# Patient Record
Sex: MALE | Race: WHITE | NOT HISPANIC OR LATINO | Employment: OTHER | URBAN - METROPOLITAN AREA
[De-identification: names, ages, dates, MRNs, and addresses within clinical notes are randomized per-mention and may not be internally consistent; named-entity substitution may affect disease eponyms.]

---

## 2017-04-07 DIAGNOSIS — I10 ESSENTIAL HYPERTENSION: Primary | ICD-10-CM

## 2017-04-16 ENCOUNTER — RESULTS ENCOUNTER (OUTPATIENT)
Dept: FAMILY MEDICINE CLINIC | Facility: CLINIC | Age: 62
End: 2017-04-16

## 2017-04-16 DIAGNOSIS — I10 ESSENTIAL HYPERTENSION: ICD-10-CM

## 2017-04-18 LAB
BUN SERPL-MCNC: 18 MG/DL (ref 6–24)
BUN/CREAT SERPL: 20 (ref 9–20)
CALCIUM SERPL-MCNC: 9.5 MG/DL (ref 8.7–10.2)
CHLORIDE SERPL-SCNC: 106 MMOL/L (ref 96–106)
CO2 SERPL-SCNC: 22 MMOL/L (ref 18–29)
CREAT SERPL-MCNC: 0.92 MG/DL (ref 0.76–1.27)
GLUCOSE SERPL-MCNC: 105 MG/DL (ref 65–99)
POTASSIUM SERPL-SCNC: 3.9 MMOL/L (ref 3.5–5.2)
SODIUM SERPL-SCNC: 144 MMOL/L (ref 134–144)

## 2017-04-18 RX ORDER — ATENOLOL 25 MG/1
TABLET ORAL
Qty: 90 TABLET | Refills: 2 | Status: SHIPPED | OUTPATIENT
Start: 2017-04-18 | End: 2017-06-26 | Stop reason: SDUPTHER

## 2017-04-18 RX ORDER — LISINOPRIL AND HYDROCHLOROTHIAZIDE 20; 12.5 MG/1; MG/1
TABLET ORAL
Qty: 90 TABLET | Refills: 2 | Status: SHIPPED | OUTPATIENT
Start: 2017-04-18 | End: 2018-01-12 | Stop reason: SDUPTHER

## 2017-04-24 ENCOUNTER — OFFICE VISIT (OUTPATIENT)
Dept: FAMILY MEDICINE CLINIC | Facility: CLINIC | Age: 62
End: 2017-04-24

## 2017-04-24 VITALS
OXYGEN SATURATION: 96 % | SYSTOLIC BLOOD PRESSURE: 116 MMHG | HEIGHT: 73 IN | DIASTOLIC BLOOD PRESSURE: 78 MMHG | BODY MASS INDEX: 31.68 KG/M2 | HEART RATE: 45 BPM | WEIGHT: 239 LBS

## 2017-04-24 DIAGNOSIS — I10 ESSENTIAL HYPERTENSION: Primary | ICD-10-CM

## 2017-04-24 DIAGNOSIS — R73.01 IFG (IMPAIRED FASTING GLUCOSE): ICD-10-CM

## 2017-04-24 PROCEDURE — 99212 OFFICE O/P EST SF 10 MIN: CPT | Performed by: INTERNAL MEDICINE

## 2017-04-24 NOTE — PROGRESS NOTES
"Chief Complaint   Patient presents with   • Hypertension     6 month f/u & review labs   • Edema     Blood pressures have been consistently elevated and dental visits around 2010 and 2011.  We then started him on medication, gradually added multiple meds, and finally got his blood pressure under control around 2012.  This was with Zestoretic, atenolol 25 mg, and amlodipine 10 mg.  He developed pedal edema on the amlodipine, but tolerated the other 2 well.    He has continued exercise regularly since being diagnosed with hypertension.  He does the gym, he rides a bike, and he plays hockey in a men's league during the hockey season, which just finished.  He feels no limitations.  He denies chest pain or shortness of breath while active.    In October, we stopped the amlodipine due to the lower extremity edema, and it has resolved fully.  He has monitored his blood pressure at home, and has seen readings consistently around the 110-120/70 range.        Current Outpatient Prescriptions:   •  atenolol (TENORMIN) 25 MG tablet, TAKE ONE TABLET BY MOUTH DAILY, Disp: 90 tablet, Rfl: 2  •  lisinopril-hydrochlorothiazide (PRINZIDE,ZESTORETIC) 20-12.5 MG per tablet, TAKE ONE TABLET BY MOUTH DAILY, Disp: 90 tablet, Rfl: 2  •  Multiple Vitamins-Minerals (MULTIVITAMIN ADULT PO), Take  by mouth., Disp: , Rfl:     No Known Allergies    ROS:  No cough.  No muscle cramps.  No syncope or orthostatic symptoms.  No angina.  No further lower extremity edema as noted above.    /78 (BP Location: Right arm, Patient Position: Sitting, Cuff Size: Large Adult)  Pulse (!) 45  Ht 73\" (185.4 cm)  Wt 239 lb (108 kg)  SpO2 96%  BMI 31.53 kg/m2    EXAM:  Well-developed, well-nourished, in no acute distress.  Sclerae are anicteric; no periorbital edema.  [place] no carotid bruit.  Cardiac with a regular rhythm, and no murmur.  No lower extremity edema.  Pedal pulses are full bilaterally.  Station, gait, and coordination are normal.    Marcell " was seen today for hypertension and edema.    Diagnoses and all orders for this visit:    Essential hypertension    IFG (impaired fasting glucose)    Blood pressure readings look great on the amlodipine.  Continue the Zestoretic and atenolol.  Although he has bradycardia, he is completely asymptomatic from it.  He will contact us if he develops orthostatic symptoms.    [facility] was reviewed with the patient today.  The only abnormal maladies the blood sugar of 105.  He should remain active and curtail the starches in his diet.  Follow-up in 6 months.

## 2017-06-22 ENCOUNTER — OFFICE VISIT (OUTPATIENT)
Dept: FAMILY MEDICINE CLINIC | Facility: CLINIC | Age: 62
End: 2017-06-22

## 2017-06-22 VITALS
BODY MASS INDEX: 32.13 KG/M2 | DIASTOLIC BLOOD PRESSURE: 86 MMHG | HEART RATE: 46 BPM | HEIGHT: 73 IN | SYSTOLIC BLOOD PRESSURE: 118 MMHG | WEIGHT: 242.4 LBS | OXYGEN SATURATION: 96 %

## 2017-06-22 DIAGNOSIS — L25.5 RHUS DERMATITIS: Primary | ICD-10-CM

## 2017-06-22 PROCEDURE — 99213 OFFICE O/P EST LOW 20 MIN: CPT | Performed by: INTERNAL MEDICINE

## 2017-06-22 RX ORDER — PREDNISONE 10 MG/1
TABLET ORAL
Qty: 30 TABLET | Refills: 0 | Status: SHIPPED | OUTPATIENT
Start: 2017-06-22 | End: 2017-12-05

## 2017-06-22 NOTE — PROGRESS NOTES
"Chief Complaint   Patient presents with   • Rash     on legs & arms, symptoms x 2 weeks     Acute onset of rash 5-6 days ago, after clearing brush and burning it.  Pruruitic; starting to spread proximally.    He reports that he has never smoked. He has never used smokeless tobacco. He reports that he drinks alcohol. He reports that he does not use illicit drugs.     ROS: No SOA or wheezing.     /86 (BP Location: Left arm, Patient Position: Sitting, Cuff Size: Large Adult)  Pulse (!) 46  Ht 73\" (185.4 cm)  Wt 242 lb 6.4 oz (110 kg)  SpO2 96%  BMI 31.98 kg/m2    EXAM    Coalescent pustular eruption on the lower legs, coalescing in the popliteal fossa on the right.  No evidence of secondary infection.    Marcell was seen today for rash.    Diagnoses and all orders for this visit:    Rhus dermatitis    -     predniSONE (DELTASONE) 10 MG tablet; 4 po daily for 3 days; 3 po daily for 3 days; 2 po daily for 3 days; 1 po daily for 3 days.  May take Zyrtec bid if needed for the itching.          "

## 2017-06-27 RX ORDER — ATENOLOL 25 MG/1
12.5 TABLET ORAL DAILY
Qty: 30 TABLET | Refills: 12 | Status: SHIPPED | OUTPATIENT
Start: 2017-06-27 | End: 2018-07-08 | Stop reason: SDUPTHER

## 2017-10-11 DIAGNOSIS — Z00.00 ROUTINE GENERAL MEDICAL EXAMINATION AT A HEALTH CARE FACILITY: Primary | ICD-10-CM

## 2017-10-15 ENCOUNTER — RESULTS ENCOUNTER (OUTPATIENT)
Dept: FAMILY MEDICINE CLINIC | Facility: CLINIC | Age: 62
End: 2017-10-15

## 2017-10-15 DIAGNOSIS — Z00.00 ROUTINE GENERAL MEDICAL EXAMINATION AT A HEALTH CARE FACILITY: ICD-10-CM

## 2017-10-17 LAB
ALBUMIN SERPL-MCNC: 4.3 G/DL (ref 3.5–5.2)
ALBUMIN/GLOB SERPL: 2 G/DL
ALP SERPL-CCNC: 68 U/L (ref 39–117)
ALT SERPL-CCNC: 35 U/L (ref 1–41)
APPEARANCE UR: CLEAR
AST SERPL-CCNC: 24 U/L (ref 1–40)
BILIRUB SERPL-MCNC: 1 MG/DL (ref 0.1–1.2)
BILIRUB UR QL STRIP: NEGATIVE
BUN SERPL-MCNC: 14 MG/DL (ref 6–20)
BUN/CREAT SERPL: 13.3 (ref 7–25)
CALCIUM SERPL-MCNC: 9.5 MG/DL (ref 8.6–10.5)
CHLORIDE SERPL-SCNC: 100 MMOL/L (ref 98–107)
CHOLEST SERPL-MCNC: 141 MG/DL (ref 0–200)
CO2 SERPL-SCNC: 27.6 MMOL/L (ref 22–29)
COLOR UR: YELLOW
CREAT SERPL-MCNC: 1.05 MG/DL (ref 0.76–1.27)
ERYTHROCYTE [DISTWIDTH] IN BLOOD BY AUTOMATED COUNT: 12.9 % (ref 11.5–14.5)
GLOBULIN SER CALC-MCNC: 2.2 GM/DL
GLUCOSE SERPL-MCNC: 100 MG/DL (ref 65–99)
GLUCOSE UR QL: NEGATIVE
HCT VFR BLD AUTO: 46.7 % (ref 40.4–52.2)
HDLC SERPL-MCNC: 30 MG/DL (ref 40–60)
HGB BLD-MCNC: 15.6 G/DL (ref 13.7–17.6)
HGB UR QL STRIP: NEGATIVE
KETONES UR QL STRIP: NEGATIVE
LDLC SERPL CALC-MCNC: 81 MG/DL (ref 0–100)
LEUKOCYTE ESTERASE UR QL STRIP: NEGATIVE
MCH RBC QN AUTO: 30.1 PG (ref 27–32.7)
MCHC RBC AUTO-ENTMCNC: 33.4 G/DL (ref 32.6–36.4)
MCV RBC AUTO: 90.2 FL (ref 79.8–96.2)
NITRITE UR QL STRIP: NEGATIVE
PH UR STRIP: 6 [PH] (ref 5–8)
PLATELET # BLD AUTO: 231 10*3/MM3 (ref 140–500)
POTASSIUM SERPL-SCNC: 4.5 MMOL/L (ref 3.5–5.2)
PROT SERPL-MCNC: 6.5 G/DL (ref 6–8.5)
PROT UR QL STRIP: NEGATIVE
RBC # BLD AUTO: 5.18 10*6/MM3 (ref 4.6–6)
SODIUM SERPL-SCNC: 142 MMOL/L (ref 136–145)
SP GR UR: 1.02 (ref 1–1.03)
TRIGL SERPL-MCNC: 152 MG/DL (ref 0–150)
UROBILINOGEN UR STRIP-MCNC: NORMAL MG/DL
VLDLC SERPL CALC-MCNC: 30.4 MG/DL (ref 5–40)
WBC # BLD AUTO: 6.26 10*3/MM3 (ref 4.5–10.7)

## 2017-12-05 ENCOUNTER — OFFICE VISIT (OUTPATIENT)
Dept: FAMILY MEDICINE CLINIC | Facility: CLINIC | Age: 62
End: 2017-12-05

## 2017-12-05 VITALS
DIASTOLIC BLOOD PRESSURE: 78 MMHG | SYSTOLIC BLOOD PRESSURE: 118 MMHG | BODY MASS INDEX: 32.6 KG/M2 | HEIGHT: 73 IN | OXYGEN SATURATION: 98 % | HEART RATE: 93 BPM | WEIGHT: 246 LBS

## 2017-12-05 DIAGNOSIS — R73.01 IFG (IMPAIRED FASTING GLUCOSE): ICD-10-CM

## 2017-12-05 DIAGNOSIS — I10 ESSENTIAL HYPERTENSION: Primary | ICD-10-CM

## 2017-12-05 PROCEDURE — 99212 OFFICE O/P EST SF 10 MIN: CPT | Performed by: INTERNAL MEDICINE

## 2017-12-05 NOTE — PROGRESS NOTES
"Chief Complaint   Patient presents with   • Hypertension     Review labs     Blood pressures have been consistently elevated and dental visits around 2010 and 2011.  We then started him on medication, gradually added multiple meds, and finally got his blood pressure under control around 2012. This was with Zestoretic, atenolol 25 mg, and amlodipine 10 mg.  He developed pedal edema on the amlodipine, but tolerated the other 2 well.     He has continued exercise regularly since being diagnosed with hypertension.  He does the gym, he rides a bike, and he plays hockey in a men's league.  He feels no limitations.  He denies chest pain and shortness of breath while active playing and hiking.     In October, 2016, we stopped the amlodipine due to the lower extremity edema, and it has resolved fully.  His BP has been controlled since then.    No Known Allergies      Current Outpatient Prescriptions:   •  atenolol (TENORMIN) 25 MG tablet, Take 0.5 tablets by mouth Daily., Disp: 30 tablet, Rfl: 12  •  lisinopril-hydrochlorothiazide (PRINZIDE,ZESTORETIC) 20-12.5 MG per tablet, TAKE ONE TABLET BY MOUTH DAILY, Disp: 90 tablet, Rfl: 2  •  Multiple Vitamins-Minerals (MULTIVITAMIN ADULT PO), Take  by mouth., Disp: , Rfl:     ROS:   No cough.  No muscle cramps.  No syncope or orthostatic symptoms.  No angina.  No further lower extremity edema as noted above.      Vitals:    12/05/17 1322   BP: 118/78   BP Location: Left arm   Patient Position: Sitting   Cuff Size: Large Adult   Pulse: 93   SpO2: 98%   Weight: 112 kg (246 lb)   Height: 185.4 cm (73\")     EXAM:  Well-developed, well-nourished, in no acute distress.  Sclerae are anicteric; no periorbital edema.  [place] no carotid bruit.  Cardiac with a regular rhythm, and no murmur.  No lower extremity edema.  Pedal pulses are full bilaterally.    Marcell was seen today for hypertension.    Diagnoses and all orders for this visit:    Essential hypertension    IFG (impaired fasting " glucose)    Labs reviewed.  He will work on the exercise to raise his HDL and lower the glucose.  Continue the meds unchanged.      CPE in 6 months.

## 2018-01-15 RX ORDER — LISINOPRIL AND HYDROCHLOROTHIAZIDE 20; 12.5 MG/1; MG/1
TABLET ORAL
Qty: 30 TABLET | Refills: 5 | Status: SHIPPED | OUTPATIENT
Start: 2018-01-15 | End: 2018-12-05 | Stop reason: SDUPTHER

## 2018-05-22 DIAGNOSIS — Z12.5 SCREENING PSA (PROSTATE SPECIFIC ANTIGEN): ICD-10-CM

## 2018-05-22 DIAGNOSIS — Z00.00 ROUTINE GENERAL MEDICAL EXAMINATION AT HEALTH CARE FACILITY: Primary | ICD-10-CM

## 2018-05-29 LAB
ALBUMIN SERPL-MCNC: 4.4 G/DL (ref 3.5–5.2)
ALBUMIN/GLOB SERPL: 2.4 G/DL
ALP SERPL-CCNC: 60 U/L (ref 39–117)
ALT SERPL-CCNC: 35 U/L (ref 1–41)
APPEARANCE UR: CLEAR
AST SERPL-CCNC: 25 U/L (ref 1–40)
BILIRUB SERPL-MCNC: 0.8 MG/DL (ref 0.1–1.2)
BILIRUB UR QL STRIP: NEGATIVE
BUN SERPL-MCNC: 21 MG/DL (ref 8–23)
BUN/CREAT SERPL: 23.1 (ref 7–25)
CALCIUM SERPL-MCNC: 9 MG/DL (ref 8.6–10.5)
CHLORIDE SERPL-SCNC: 104 MMOL/L (ref 98–107)
CHOLEST SERPL-MCNC: 133 MG/DL (ref 0–200)
CO2 SERPL-SCNC: 25 MMOL/L (ref 22–29)
COLOR UR: ABNORMAL
CREAT SERPL-MCNC: 0.91 MG/DL (ref 0.76–1.27)
ERYTHROCYTE [DISTWIDTH] IN BLOOD BY AUTOMATED COUNT: 13 % (ref 11.5–14.5)
GFR SERPLBLD CREATININE-BSD FMLA CKD-EPI: 103 ML/MIN/1.73
GFR SERPLBLD CREATININE-BSD FMLA CKD-EPI: 85 ML/MIN/1.73
GLOBULIN SER CALC-MCNC: 1.8 GM/DL
GLUCOSE SERPL-MCNC: 108 MG/DL (ref 65–99)
GLUCOSE UR QL: NEGATIVE
HCT VFR BLD AUTO: 46.7 % (ref 40.4–52.2)
HDLC SERPL-MCNC: 31 MG/DL (ref 40–60)
HGB BLD-MCNC: 15.8 G/DL (ref 13.7–17.6)
HGB UR QL STRIP: NEGATIVE
KETONES UR QL STRIP: NEGATIVE
LDLC SERPL CALC-MCNC: 79 MG/DL (ref 0–100)
LEUKOCYTE ESTERASE UR QL STRIP: NEGATIVE
MCH RBC QN AUTO: 29.9 PG (ref 27–32.7)
MCHC RBC AUTO-ENTMCNC: 33.8 G/DL (ref 32.6–36.4)
MCV RBC AUTO: 88.3 FL (ref 79.8–96.2)
NITRITE UR QL STRIP: NEGATIVE
PH UR STRIP: 5.5 [PH] (ref 5–8)
PLATELET # BLD AUTO: 223 10*3/MM3 (ref 140–500)
POTASSIUM SERPL-SCNC: 4.3 MMOL/L (ref 3.5–5.2)
PROT SERPL-MCNC: 6.2 G/DL (ref 6–8.5)
PROT UR QL STRIP: NEGATIVE
PSA SERPL-MCNC: 1.21 NG/ML (ref 0–4)
RBC # BLD AUTO: 5.29 10*6/MM3 (ref 4.6–6)
SODIUM SERPL-SCNC: 143 MMOL/L (ref 136–145)
SP GR UR: ABNORMAL (ref 1–1.03)
TRIGL SERPL-MCNC: 117 MG/DL (ref 0–150)
UROBILINOGEN UR STRIP-MCNC: ABNORMAL MG/DL
VLDLC SERPL CALC-MCNC: 23.4 MG/DL (ref 5–40)
WBC # BLD AUTO: 5.42 10*3/MM3 (ref 4.5–10.7)

## 2018-06-05 ENCOUNTER — OFFICE VISIT (OUTPATIENT)
Dept: FAMILY MEDICINE CLINIC | Facility: CLINIC | Age: 63
End: 2018-06-05

## 2018-06-05 VITALS
DIASTOLIC BLOOD PRESSURE: 76 MMHG | HEIGHT: 73 IN | WEIGHT: 242.8 LBS | HEART RATE: 51 BPM | BODY MASS INDEX: 32.18 KG/M2 | OXYGEN SATURATION: 95 % | SYSTOLIC BLOOD PRESSURE: 140 MMHG

## 2018-06-05 DIAGNOSIS — Z00.00 ROUTINE GENERAL MEDICAL EXAMINATION AT HEALTH CARE FACILITY: Primary | ICD-10-CM

## 2018-06-05 DIAGNOSIS — E78.5 DYSLIPIDEMIA: ICD-10-CM

## 2018-06-05 DIAGNOSIS — I10 ESSENTIAL HYPERTENSION: ICD-10-CM

## 2018-06-05 DIAGNOSIS — Z12.5 PROSTATE CANCER SCREENING: ICD-10-CM

## 2018-06-05 PROCEDURE — 99396 PREV VISIT EST AGE 40-64: CPT | Performed by: INTERNAL MEDICINE

## 2018-06-05 RX ORDER — CETIRIZINE HYDROCHLORIDE 10 MG/1
10 TABLET ORAL DAILY
Status: ON HOLD | COMMUNITY
End: 2019-11-23

## 2018-06-05 NOTE — PROGRESS NOTES
Subjective   Marcell De Santiago is a 62 y.o. male who presents today for:    Annual Exam (PHE & review labs)    History of Present Illness   He is up to date re: c-scopes, with a hyperplastic polyp in 2013.    Immunization History   Administered Date(s) Administered   • DTaP 08/11/2011         Blood pressures have been consistently elevated and dental visits around 2010 and 2011.  We then started him on medication, gradually added multiple meds, and finally got his blood pressure under control around 2012. This was with Zestoretic, atenolol 25 mg, and amlodipine 10 mg.  He developed pedal edema on the amlodipine, but tolerated the other 2 well.  In October, 2016, we stopped the amlodipine due to the lower extremity edema, and it has resolved fully.    Mr. De Santiago  reports that he has never smoked. He has never used smokeless tobacco. He reports that he drinks alcohol. He reports that he does not use drugs.     No Known Allergies    Current Outpatient Prescriptions:   •  atenolol (TENORMIN) 25 MG tablet, Take 0.5 tablets by mouth Daily., Disp: 30 tablet, Rfl: 12  •  cetirizine (zyrTEC) 10 MG tablet, Take 10 mg by mouth Daily., Disp: , Rfl:   •  lisinopril-hydrochlorothiazide (PRINZIDE,ZESTORETIC) 20-12.5 MG per tablet, TAKE ONE TABLET BY MOUTH DAILY, Disp: 30 tablet, Rfl: 5  •  Multiple Vitamins-Minerals (MULTIVITAMIN ADULT PO), Take  by mouth., Disp: , Rfl:       Review of Systems   Constitutional: Negative for diaphoresis.   Eyes: Negative for visual disturbance.   Respiratory: Negative for cough and chest tightness.    Cardiovascular: Negative for chest pain, palpitations and leg swelling.   Gastrointestinal: Negative for abdominal pain.   Genitourinary: Negative.    Musculoskeletal: Negative for myalgias.   Allergic/Immunologic: Positive for environmental allergies.   Neurological: Negative for dizziness, syncope, numbness and headaches.   Hematological: Does not bruise/bleed easily.   Psychiatric/Behavioral:  "Negative.           Objective   Vitals:    06/05/18 0825   BP: 146/90   BP Location: Right arm   Patient Position: Sitting   Cuff Size: Large Adult   Pulse: 51   SpO2: 95%   Weight: 110 kg (242 lb 12.8 oz)   Height: 185.4 cm (73\")     Physical Exam   Constitutional: He is oriented to person, place, and time. He appears well-developed and well-nourished.   Eyes: Conjunctivae are normal. No scleral icterus.   Neck: Carotid bruit is not present. No thyroid mass and no thyromegaly present.   Cardiovascular: Regular rhythm, normal heart sounds and intact distal pulses.  Bradycardia present.    No pedal edema.   Pulmonary/Chest: Effort normal and breath sounds normal.   Abdominal: Soft. Bowel sounds are normal. He exhibits no abdominal bruit.   Genitourinary:   Genitourinary Comments: Deferred   Neurological: He is alert and oriented to person, place, and time. He has normal strength.   Skin:   Moderate sun-exposure changes on the trunk and extremities, but no suspicious lesions noted.     Psychiatric: He has a normal mood and affect.           Marcell was seen today for annual exam.    Diagnoses and all orders for this visit:    Routine general medical examination at health care facility  Reviewed labs and discussed age-appropriate HM issues.    Declines the shingles vaccine and flu vaccine.  UTD re: c-scope.    Prostate cancer screening  PSA looks good.    Essential hypertension  Dyslipidemia  He will work on the diet and increase the exercise.  We will recheck the BP and lipids in the fall.    "

## 2018-07-15 RX ORDER — ATENOLOL 25 MG/1
TABLET ORAL
Qty: 15 TABLET | Refills: 11 | Status: SHIPPED | OUTPATIENT
Start: 2018-07-15 | End: 2018-12-05 | Stop reason: ALTCHOICE

## 2018-11-15 DIAGNOSIS — I10 ESSENTIAL HYPERTENSION: Primary | ICD-10-CM

## 2018-11-15 DIAGNOSIS — E78.5 DYSLIPIDEMIA: ICD-10-CM

## 2018-11-19 DIAGNOSIS — E78.5 DYSLIPIDEMIA: ICD-10-CM

## 2018-11-19 DIAGNOSIS — I10 ESSENTIAL HYPERTENSION: ICD-10-CM

## 2018-11-19 LAB
ALBUMIN SERPL-MCNC: 4.5 G/DL (ref 3.5–5.2)
ALBUMIN/GLOB SERPL: 1.9 G/DL
ALP SERPL-CCNC: 75 U/L (ref 39–117)
ALT SERPL-CCNC: 35 U/L (ref 1–41)
AST SERPL-CCNC: 20 U/L (ref 1–40)
BILIRUB SERPL-MCNC: 1 MG/DL (ref 0.1–1.2)
BUN SERPL-MCNC: 15 MG/DL (ref 8–23)
BUN/CREAT SERPL: 13.9 (ref 7–25)
CALCIUM SERPL-MCNC: 9.7 MG/DL (ref 8.6–10.5)
CHLORIDE SERPL-SCNC: 103 MMOL/L (ref 98–107)
CHOLEST SERPL-MCNC: 137 MG/DL (ref 0–200)
CO2 SERPL-SCNC: 29.2 MMOL/L (ref 22–29)
CREAT SERPL-MCNC: 1.08 MG/DL (ref 0.76–1.27)
GLOBULIN SER CALC-MCNC: 2.4 GM/DL
GLUCOSE SERPL-MCNC: 105 MG/DL (ref 65–99)
HDLC SERPL-MCNC: 33 MG/DL (ref 40–60)
LDLC SERPL CALC-MCNC: 85 MG/DL (ref 0–100)
POTASSIUM SERPL-SCNC: 4 MMOL/L (ref 3.5–5.2)
PROT SERPL-MCNC: 6.9 G/DL (ref 6–8.5)
SODIUM SERPL-SCNC: 144 MMOL/L (ref 136–145)
TRIGL SERPL-MCNC: 95 MG/DL (ref 0–150)
VLDLC SERPL CALC-MCNC: 19 MG/DL (ref 5–40)

## 2018-12-05 ENCOUNTER — OFFICE VISIT (OUTPATIENT)
Dept: FAMILY MEDICINE CLINIC | Facility: CLINIC | Age: 63
End: 2018-12-05

## 2018-12-05 VITALS
SYSTOLIC BLOOD PRESSURE: 144 MMHG | HEART RATE: 41 BPM | OXYGEN SATURATION: 97 % | WEIGHT: 241 LBS | HEIGHT: 73 IN | DIASTOLIC BLOOD PRESSURE: 84 MMHG | BODY MASS INDEX: 31.94 KG/M2

## 2018-12-05 DIAGNOSIS — R73.01 IFG (IMPAIRED FASTING GLUCOSE): ICD-10-CM

## 2018-12-05 DIAGNOSIS — I10 ESSENTIAL HYPERTENSION: Primary | ICD-10-CM

## 2018-12-05 DIAGNOSIS — E78.5 DYSLIPIDEMIA: ICD-10-CM

## 2018-12-05 LAB
EXPIRATION DATE: NORMAL
HBA1C MFR BLD: 4.9 % (ref 4.8–5.6)
Lab: NORMAL

## 2018-12-05 PROCEDURE — 83036 HEMOGLOBIN GLYCOSYLATED A1C: CPT | Performed by: INTERNAL MEDICINE

## 2018-12-05 PROCEDURE — 99213 OFFICE O/P EST LOW 20 MIN: CPT | Performed by: INTERNAL MEDICINE

## 2018-12-05 PROCEDURE — 36416 COLLJ CAPILLARY BLOOD SPEC: CPT | Performed by: INTERNAL MEDICINE

## 2018-12-05 RX ORDER — LISINOPRIL AND HYDROCHLOROTHIAZIDE 20; 12.5 MG/1; MG/1
1 TABLET ORAL DAILY
Qty: 30 TABLET | Refills: 5 | Status: CANCELLED | OUTPATIENT
Start: 2018-12-05

## 2018-12-05 RX ORDER — LISINOPRIL AND HYDROCHLOROTHIAZIDE 20; 12.5 MG/1; MG/1
2 TABLET ORAL DAILY
Qty: 60 TABLET | Refills: 5 | Status: SHIPPED | OUTPATIENT
Start: 2018-12-05 | End: 2019-05-30 | Stop reason: SDUPTHER

## 2018-12-05 NOTE — PROGRESS NOTES
Subjective   Marcell De Santiago is a 63 y.o. male who presents today for:    Hypertension (6 month f/u & review labs); Hyperlipidemia; and Impaired Fasting Glucose    History of Present Illness     Blood pressures have been consistently elevated and dental visits around 2010 and 2011.  We then started him on medication, gradually added multiple meds, and finally got his blood pressure under control around 2012. This was with Zestoretic, atenolol 25 mg, and amlodipine 10 mg.  He developed pedal edema on the amlodipine, but tolerated the other 2 well.    In October, 2016, we stopped the amlodipine due to the lower extremity edema, and it has resolved fully.  His BP has been controlled since then.    He has had elevated blood sugars but has not crossed the diabetic threshold.    He has mild dyslipidemia with a low HDL.  He is not on any meds.       Mr. De Santiago  reports that  has never smoked. he has never used smokeless tobacco. He reports that he drinks alcohol. He reports that he does not use drugs.     No Known Allergies    Current Outpatient Medications:   •  atenolol (TENORMIN) 25 MG tablet, TAKE ONE-HALF TABLET BY MOUTH DAILY, Disp: 15 tablet, Rfl: 11  •  cetirizine (zyrTEC) 10 MG tablet, Take 10 mg by mouth Daily., Disp: , Rfl:   •  lisinopril-hydrochlorothiazide (PRINZIDE,ZESTORETIC) 20-12.5 MG per tablet, TAKE ONE TABLET BY MOUTH DAILY, Disp: 30 tablet, Rfl: 5  •  Multiple Vitamins-Minerals (MULTIVITAMIN ADULT PO), Take  by mouth., Disp: , Rfl:       Review of Systems   Constitutional: Negative for diaphoresis.   Eyes: Negative for visual disturbance.   Respiratory: Negative for cough and chest tightness.    Cardiovascular: Negative for chest pain, palpitations and leg swelling.   Gastrointestinal: Negative for abdominal pain.   Genitourinary:        ED   Musculoskeletal: Negative for myalgias.   Neurological: Negative for dizziness, syncope, numbness and headaches.   Hematological: Does not bruise/bleed easily.  "        Objective   Vitals:    12/05/18 0828   BP: 130/88   BP Location: Right arm   Patient Position: Sitting   Cuff Size: Large Adult   Pulse: (!) 41   SpO2: 97%   Weight: 109 kg (241 lb)   Height: 185.4 cm (73\")     Physical Exam   Constitutional: He is oriented to person, place, and time. He appears well-developed and well-nourished. No distress.   Neck: Carotid bruit is not present.   Cardiovascular: Regular rhythm, S1 normal and S2 normal. Bradycardia present.   No murmur heard.  Pulmonary/Chest: Effort normal and breath sounds normal.   Abdominal: Soft. Bowel sounds are normal. There is no tenderness.   Neurological: He is alert and oriented to person, place, and time.             Marcell was seen today for hypertension, hyperlipidemia and impaired fasting glucose.    Diagnoses and all orders for this visit:    Essential hypertension  -     lisinopril-hydrochlorothiazide (PRINZIDE,ZESTORETIC) 20-12.5 MG per tablet; Take 2 tablets by mouth Daily.    Dyslipidemia    IFG (impaired fasting glucose)  -     POC Glycated Hemoglobin, Total      Stop the atenolol b/c of the marked bradycardia and the mild ED.  Increase the Zestoretic to 2 pills every morning.  RTO in 6 months.  "

## 2019-05-30 DIAGNOSIS — I10 ESSENTIAL HYPERTENSION: ICD-10-CM

## 2019-05-30 RX ORDER — LISINOPRIL AND HYDROCHLOROTHIAZIDE 20; 12.5 MG/1; MG/1
2 TABLET ORAL DAILY
Qty: 60 TABLET | Refills: 0 | Status: SHIPPED | OUTPATIENT
Start: 2019-05-30 | End: 2019-06-26 | Stop reason: SDUPTHER

## 2019-06-26 DIAGNOSIS — I10 ESSENTIAL HYPERTENSION: ICD-10-CM

## 2019-06-26 RX ORDER — LISINOPRIL AND HYDROCHLOROTHIAZIDE 20; 12.5 MG/1; MG/1
TABLET ORAL
Qty: 60 TABLET | Refills: 0 | Status: SHIPPED | OUTPATIENT
Start: 2019-06-26 | End: 2019-07-30 | Stop reason: SDUPTHER

## 2019-07-03 ENCOUNTER — OFFICE VISIT (OUTPATIENT)
Dept: FAMILY MEDICINE CLINIC | Facility: CLINIC | Age: 64
End: 2019-07-03

## 2019-07-03 VITALS
DIASTOLIC BLOOD PRESSURE: 78 MMHG | SYSTOLIC BLOOD PRESSURE: 118 MMHG | HEART RATE: 62 BPM | WEIGHT: 240.6 LBS | HEIGHT: 73 IN | BODY MASS INDEX: 31.89 KG/M2 | OXYGEN SATURATION: 99 % | TEMPERATURE: 97.7 F

## 2019-07-03 DIAGNOSIS — M54.40 CHRONIC LOW BACK PAIN WITH SCIATICA, SCIATICA LATERALITY UNSPECIFIED, UNSPECIFIED BACK PAIN LATERALITY: ICD-10-CM

## 2019-07-03 DIAGNOSIS — G89.29 CHRONIC LOW BACK PAIN WITH SCIATICA, SCIATICA LATERALITY UNSPECIFIED, UNSPECIFIED BACK PAIN LATERALITY: ICD-10-CM

## 2019-07-03 DIAGNOSIS — Z00.00 PHYSICAL EXAM, ANNUAL: Primary | ICD-10-CM

## 2019-07-03 DIAGNOSIS — I10 ESSENTIAL HYPERTENSION: ICD-10-CM

## 2019-07-03 DIAGNOSIS — E78.5 DYSLIPIDEMIA: ICD-10-CM

## 2019-07-03 LAB
25(OH)D3 SERPL-MCNC: 30.6 NG/ML (ref 30–100)
ALBUMIN SERPL-MCNC: 4.3 G/DL (ref 3.5–5.2)
ALBUMIN/GLOB SERPL: 1.7 G/DL
ALP SERPL-CCNC: 55 U/L (ref 39–117)
ALT SERPL W P-5'-P-CCNC: 39 U/L (ref 1–41)
ANION GAP SERPL CALCULATED.3IONS-SCNC: 11 MMOL/L (ref 5–15)
AST SERPL-CCNC: 26 U/L (ref 1–40)
BILIRUB SERPL-MCNC: 0.8 MG/DL (ref 0.2–1.2)
BUN BLD-MCNC: 15 MG/DL (ref 8–23)
BUN/CREAT SERPL: 15.6 (ref 7–25)
CALCIUM SPEC-SCNC: 9.5 MG/DL (ref 8.6–10.5)
CHLORIDE SERPL-SCNC: 102 MMOL/L (ref 98–107)
CHOLEST SERPL-MCNC: 121 MG/DL (ref 0–200)
CO2 SERPL-SCNC: 27 MMOL/L (ref 22–29)
CREAT BLD-MCNC: 0.96 MG/DL (ref 0.76–1.27)
DEPRECATED RDW RBC AUTO: 39.9 FL (ref 37–54)
ERYTHROCYTE [DISTWIDTH] IN BLOOD BY AUTOMATED COUNT: 12.3 % (ref 12.3–15.4)
GFR SERPL CREATININE-BSD FRML MDRD: 80 ML/MIN/1.73
GLOBULIN UR ELPH-MCNC: 2.5 GM/DL
GLUCOSE BLD-MCNC: 99 MG/DL (ref 65–99)
HCT VFR BLD AUTO: 46.5 % (ref 37.5–51)
HDLC SERPL-MCNC: 29 MG/DL (ref 40–60)
HGB BLD-MCNC: 15.3 G/DL (ref 13–17.7)
LDLC SERPL CALC-MCNC: 64 MG/DL (ref 0–100)
LDLC/HDLC SERPL: 2.22 {RATIO}
MCH RBC QN AUTO: 29.1 PG (ref 26.6–33)
MCHC RBC AUTO-ENTMCNC: 32.9 G/DL (ref 31.5–35.7)
MCV RBC AUTO: 88.6 FL (ref 79–97)
PLATELET # BLD AUTO: 267 10*3/MM3 (ref 140–450)
PMV BLD AUTO: 10.9 FL (ref 6–12)
POTASSIUM BLD-SCNC: 4.2 MMOL/L (ref 3.5–5.2)
PROT SERPL-MCNC: 6.8 G/DL (ref 6–8.5)
PSA SERPL-MCNC: 1.22 NG/ML (ref 0–4)
RBC # BLD AUTO: 5.25 10*6/MM3 (ref 4.14–5.8)
SODIUM BLD-SCNC: 140 MMOL/L (ref 136–145)
TRIGL SERPL-MCNC: 138 MG/DL (ref 0–150)
VLDLC SERPL-MCNC: 27.6 MG/DL (ref 5–40)
WBC NRBC COR # BLD: 5.6 10*3/MM3 (ref 3.4–10.8)

## 2019-07-03 PROCEDURE — 80053 COMPREHEN METABOLIC PANEL: CPT | Performed by: INTERNAL MEDICINE

## 2019-07-03 PROCEDURE — G0103 PSA SCREENING: HCPCS | Performed by: INTERNAL MEDICINE

## 2019-07-03 PROCEDURE — 99396 PREV VISIT EST AGE 40-64: CPT | Performed by: INTERNAL MEDICINE

## 2019-07-03 PROCEDURE — 82306 VITAMIN D 25 HYDROXY: CPT | Performed by: INTERNAL MEDICINE

## 2019-07-03 PROCEDURE — 80061 LIPID PANEL: CPT | Performed by: INTERNAL MEDICINE

## 2019-07-03 PROCEDURE — 85027 COMPLETE CBC AUTOMATED: CPT | Performed by: INTERNAL MEDICINE

## 2019-07-03 PROCEDURE — 36415 COLL VENOUS BLD VENIPUNCTURE: CPT | Performed by: INTERNAL MEDICINE

## 2019-07-03 NOTE — PROGRESS NOTES
Subjective   Marcell De Santiago is a 63 y.o. male.     History of Present Illness   Patient was seen for a physical.  Diet and physical activity were discussed.    Dictated utilizing Dragon dictation. If there are questions or for further clarification, please contact me.  The following portions of the patient's history were reviewed and updated as appropriate: allergies, current medications, past family history, past medical history, past social history, past surgical history and problem list.    Review of Systems   Constitutional: Negative for fatigue and fever.   HENT: Positive for congestion. Negative for trouble swallowing.    Eyes: Negative for discharge and visual disturbance.   Respiratory: Negative for choking and shortness of breath.    Cardiovascular: Negative for chest pain and palpitations.   Gastrointestinal: Negative for abdominal pain and blood in stool.   Endocrine: Negative.    Genitourinary: Negative for genital sores and hematuria.   Musculoskeletal: Negative for gait problem and joint swelling.   Skin: Negative for color change, pallor, rash and wound.   Allergic/Immunologic: Positive for environmental allergies. Negative for immunocompromised state.   Neurological: Negative for facial asymmetry and speech difficulty.   Psychiatric/Behavioral: Negative for hallucinations and suicidal ideas.       Objective   Physical Exam   Constitutional: He is oriented to person, place, and time. He appears well-developed and well-nourished. No distress.   HENT:   Head: Normocephalic and atraumatic.   Right Ear: External ear normal.   Left Ear: External ear normal.   Nose: Nose normal.   Mouth/Throat: Oropharynx is clear and moist. No oropharyngeal exudate.   Eyes: Conjunctivae and EOM are normal. Pupils are equal, round, and reactive to light. Right eye exhibits no discharge. Left eye exhibits no discharge. No scleral icterus.   Neck: Normal range of motion. Neck supple. No JVD present. No tracheal deviation  present. No thyromegaly present.   Cardiovascular: Normal rate, regular rhythm and normal heart sounds. Exam reveals no gallop and no friction rub.   No murmur heard.  Pulmonary/Chest: Effort normal and breath sounds normal. No stridor. No respiratory distress. He has no wheezes. He has no rales. He exhibits no tenderness.   Abdominal: Soft. Bowel sounds are normal. He exhibits no distension and no mass. There is no tenderness. There is no rebound and no guarding. No hernia.   Musculoskeletal: Normal range of motion. He exhibits no edema, tenderness or deformity.   Lymphadenopathy:     He has no cervical adenopathy.   Neurological: He is alert and oriented to person, place, and time. He displays normal reflexes. No cranial nerve deficit or sensory deficit. He exhibits normal muscle tone. Coordination normal.   Skin: Skin is warm and dry. No rash noted. He is not diaphoretic. No erythema. No pallor.   Psychiatric: He has a normal mood and affect. His behavior is normal. Judgment and thought content normal.   Nursing note and vitals reviewed.      Assessment/Plan   Problems Addressed this Visit        Cardiovascular and Mediastinum    Essential hypertension    Relevant Orders    CBC (No Diff)    Comprehensive Metabolic Panel    Lipid Panel    Vitamin D 25 Hydroxy    PSA Screen       Nervous and Auditory    Chronic lumbar pain    Relevant Orders    CBC (No Diff)    Comprehensive Metabolic Panel    Lipid Panel    Vitamin D 25 Hydroxy    PSA Screen       Other    Dyslipidemia    Relevant Orders    CBC (No Diff)    Comprehensive Metabolic Panel    Lipid Panel    Vitamin D 25 Hydroxy    PSA Screen      Other Visit Diagnoses     Physical exam, annual    -  Primary    Relevant Orders    CBC (No Diff)    Comprehensive Metabolic Panel    Lipid Panel    Vitamin D 25 Hydroxy    PSA Screen

## 2019-07-23 ENCOUNTER — OFFICE VISIT (OUTPATIENT)
Dept: FAMILY MEDICINE CLINIC | Facility: CLINIC | Age: 64
End: 2019-07-23

## 2019-07-23 VITALS
TEMPERATURE: 98.4 F | BODY MASS INDEX: 32.35 KG/M2 | HEART RATE: 83 BPM | HEIGHT: 73 IN | SYSTOLIC BLOOD PRESSURE: 110 MMHG | OXYGEN SATURATION: 96 % | WEIGHT: 244.1 LBS | DIASTOLIC BLOOD PRESSURE: 80 MMHG

## 2019-07-23 DIAGNOSIS — I10 ESSENTIAL HYPERTENSION: ICD-10-CM

## 2019-07-23 DIAGNOSIS — E78.5 DYSLIPIDEMIA: ICD-10-CM

## 2019-07-23 DIAGNOSIS — I10 ESSENTIAL HYPERTENSION: Primary | ICD-10-CM

## 2019-07-23 DIAGNOSIS — M54.40 CHRONIC LOW BACK PAIN WITH SCIATICA, SCIATICA LATERALITY UNSPECIFIED, UNSPECIFIED BACK PAIN LATERALITY: ICD-10-CM

## 2019-07-23 DIAGNOSIS — G89.29 CHRONIC LOW BACK PAIN WITH SCIATICA, SCIATICA LATERALITY UNSPECIFIED, UNSPECIFIED BACK PAIN LATERALITY: ICD-10-CM

## 2019-07-23 PROCEDURE — 99214 OFFICE O/P EST MOD 30 MIN: CPT | Performed by: INTERNAL MEDICINE

## 2019-07-23 RX ORDER — LISINOPRIL AND HYDROCHLOROTHIAZIDE 20; 12.5 MG/1; MG/1
TABLET ORAL
Qty: 60 TABLET | Refills: 0 | OUTPATIENT
Start: 2019-07-23

## 2019-07-23 NOTE — PROGRESS NOTES
Subjective   Marcell De Santiago is a 63 y.o. male.     History of Present Illness   Patient was seen for hypertension.  Blood pressure been running 110s over 70s.  Patient does have chronic low back pain and is being controlled with over-the-counter medications and increase activity.  Lipids controlled with diet and exercise.  Patient will continue to monitor blood pressure return to our clinic in 6 months.    Dictated utilizing Dragon dictation. If there are questions or for further clarification, please contact me.  The following portions of the patient's history were reviewed and updated as appropriate: allergies, current medications, past family history, past medical history, past social history, past surgical history and problem list.    Review of Systems   Constitutional: Negative for fatigue and fever.   HENT: Positive for congestion. Negative for trouble swallowing.    Eyes: Negative for discharge and visual disturbance.   Respiratory: Negative for choking and shortness of breath.    Cardiovascular: Negative for chest pain and palpitations.   Gastrointestinal: Negative for abdominal pain and blood in stool.   Endocrine: Negative.    Genitourinary: Negative for genital sores and hematuria.   Musculoskeletal: Positive for back pain. Negative for gait problem and joint swelling.   Skin: Negative for color change, pallor, rash and wound.   Allergic/Immunologic: Positive for environmental allergies. Negative for immunocompromised state.   Neurological: Negative for facial asymmetry and speech difficulty.   Psychiatric/Behavioral: Negative for hallucinations and suicidal ideas.       Objective   Physical Exam   Constitutional: He is oriented to person, place, and time. He appears well-developed and well-nourished.   HENT:   Head: Normocephalic.   Eyes: Conjunctivae are normal. Pupils are equal, round, and reactive to light.   Neck: Normal range of motion. Neck supple.   Cardiovascular: Normal rate, regular rhythm and  normal heart sounds.   Pulmonary/Chest: Effort normal and breath sounds normal.   Abdominal: Soft. Bowel sounds are normal.   Musculoskeletal: Normal range of motion. He exhibits tenderness. He exhibits no edema or deformity.   Neurological: He is alert and oriented to person, place, and time. He displays normal reflexes. No sensory deficit. He exhibits normal muscle tone. Coordination normal.   Skin: Skin is warm and dry.   Psychiatric: He has a normal mood and affect. His behavior is normal. Judgment and thought content normal.   Nursing note and vitals reviewed.      Assessment/Plan   Problems Addressed this Visit        Cardiovascular and Mediastinum    Essential hypertension - Primary       Nervous and Auditory    Chronic lumbar pain       Other    Dyslipidemia

## 2019-07-29 DIAGNOSIS — I10 ESSENTIAL HYPERTENSION: ICD-10-CM

## 2019-07-29 RX ORDER — LISINOPRIL AND HYDROCHLOROTHIAZIDE 20; 12.5 MG/1; MG/1
TABLET ORAL
Qty: 60 TABLET | Refills: 0 | OUTPATIENT
Start: 2019-07-29

## 2019-07-30 ENCOUNTER — TELEPHONE (OUTPATIENT)
Dept: FAMILY MEDICINE CLINIC | Facility: CLINIC | Age: 64
End: 2019-07-30

## 2019-07-30 DIAGNOSIS — I10 ESSENTIAL HYPERTENSION: ICD-10-CM

## 2019-07-30 RX ORDER — LISINOPRIL AND HYDROCHLOROTHIAZIDE 20; 12.5 MG/1; MG/1
2 TABLET ORAL DAILY
Qty: 60 TABLET | Refills: 5 | Status: SHIPPED | OUTPATIENT
Start: 2019-07-30 | End: 2019-11-27 | Stop reason: HOSPADM

## 2019-11-23 ENCOUNTER — HOSPITAL ENCOUNTER (INPATIENT)
Facility: HOSPITAL | Age: 64
LOS: 2 days | Discharge: HOME | End: 2019-11-27
Attending: EMERGENCY MEDICINE | Admitting: HOSPITALIST

## 2019-11-23 ENCOUNTER — APPOINTMENT (OUTPATIENT)
Dept: CARDIOLOGY | Facility: HOSPITAL | Age: 64
End: 2019-11-23

## 2019-11-23 DIAGNOSIS — L03.116 LEFT LEG CELLULITIS: Primary | ICD-10-CM

## 2019-11-23 LAB
ALBUMIN SERPL-MCNC: 4.1 G/DL (ref 3.5–5.2)
ALBUMIN/GLOB SERPL: 1.2 G/DL
ALP SERPL-CCNC: 58 U/L (ref 39–117)
ALT SERPL W P-5'-P-CCNC: 30 U/L (ref 1–41)
ANION GAP SERPL CALCULATED.3IONS-SCNC: 14.1 MMOL/L (ref 5–15)
AST SERPL-CCNC: 19 U/L (ref 1–40)
BASOPHILS # BLD AUTO: 0.04 10*3/MM3 (ref 0–0.2)
BASOPHILS NFR BLD AUTO: 0.3 % (ref 0–1.5)
BH CV LOWER VASCULAR LEFT COMMON FEMORAL AUGMENT: NORMAL
BH CV LOWER VASCULAR LEFT COMMON FEMORAL COMPETENT: NORMAL
BH CV LOWER VASCULAR LEFT COMMON FEMORAL COMPRESS: NORMAL
BH CV LOWER VASCULAR LEFT COMMON FEMORAL PHASIC: NORMAL
BH CV LOWER VASCULAR LEFT COMMON FEMORAL SPONT: NORMAL
BH CV LOWER VASCULAR LEFT DISTAL FEMORAL COMPRESS: NORMAL
BH CV LOWER VASCULAR LEFT GASTRONEMIUS COMPRESS: NORMAL
BH CV LOWER VASCULAR LEFT GREATER SAPH AK COMPRESS: NORMAL
BH CV LOWER VASCULAR LEFT GREATER SAPH BK COMPRESS: NORMAL
BH CV LOWER VASCULAR LEFT MID FEMORAL AUGMENT: NORMAL
BH CV LOWER VASCULAR LEFT MID FEMORAL COMPETENT: NORMAL
BH CV LOWER VASCULAR LEFT MID FEMORAL COMPRESS: NORMAL
BH CV LOWER VASCULAR LEFT MID FEMORAL PHASIC: NORMAL
BH CV LOWER VASCULAR LEFT MID FEMORAL SPONT: NORMAL
BH CV LOWER VASCULAR LEFT PERONEAL COMPRESS: NORMAL
BH CV LOWER VASCULAR LEFT POPLITEAL AUGMENT: NORMAL
BH CV LOWER VASCULAR LEFT POPLITEAL COMPETENT: NORMAL
BH CV LOWER VASCULAR LEFT POPLITEAL COMPRESS: NORMAL
BH CV LOWER VASCULAR LEFT POPLITEAL PHASIC: NORMAL
BH CV LOWER VASCULAR LEFT POPLITEAL SPONT: NORMAL
BH CV LOWER VASCULAR LEFT POSTERIOR TIBIAL COMPRESS: NORMAL
BH CV LOWER VASCULAR LEFT PROFUNDA FEMORAL COMPRESS: NORMAL
BH CV LOWER VASCULAR LEFT PROXIMAL FEMORAL COMPRESS: NORMAL
BH CV LOWER VASCULAR LEFT SAPHENOFEMORAL JUNCTION COMPRESS: NORMAL
BH CV LOWER VASCULAR RIGHT COMMON FEMORAL AUGMENT: NORMAL
BH CV LOWER VASCULAR RIGHT COMMON FEMORAL COMPETENT: NORMAL
BH CV LOWER VASCULAR RIGHT COMMON FEMORAL COMPRESS: NORMAL
BH CV LOWER VASCULAR RIGHT COMMON FEMORAL PHASIC: NORMAL
BH CV LOWER VASCULAR RIGHT COMMON FEMORAL SPONT: NORMAL
BILIRUB SERPL-MCNC: 1.8 MG/DL (ref 0.2–1.2)
BUN BLD-MCNC: 17 MG/DL (ref 8–23)
BUN/CREAT SERPL: 13.3 (ref 7–25)
CALCIUM SPEC-SCNC: 8.9 MG/DL (ref 8.6–10.5)
CHLORIDE SERPL-SCNC: 98 MMOL/L (ref 98–107)
CO2 SERPL-SCNC: 25.9 MMOL/L (ref 22–29)
CREAT BLD-MCNC: 1.28 MG/DL (ref 0.76–1.27)
CRP SERPL-MCNC: 34 MG/DL (ref 0–0.5)
D-LACTATE SERPL-SCNC: 2.9 MMOL/L (ref 0.5–2)
D-LACTATE SERPL-SCNC: 3.3 MMOL/L (ref 0.5–2)
DEPRECATED RDW RBC AUTO: 39.1 FL (ref 37–54)
EOSINOPHIL # BLD AUTO: 0 10*3/MM3 (ref 0–0.4)
EOSINOPHIL NFR BLD AUTO: 0 % (ref 0.3–6.2)
ERYTHROCYTE [DISTWIDTH] IN BLOOD BY AUTOMATED COUNT: 12.5 % (ref 12.3–15.4)
GFR SERPL CREATININE-BSD FRML MDRD: 57 ML/MIN/1.73
GLOBULIN UR ELPH-MCNC: 3.5 GM/DL
GLUCOSE BLD-MCNC: 129 MG/DL (ref 65–99)
HCT VFR BLD AUTO: 49.6 % (ref 37.5–51)
HGB BLD-MCNC: 17.2 G/DL (ref 13–17.7)
HOLD SPECIMEN: NORMAL
IMM GRANULOCYTES # BLD AUTO: 0.08 10*3/MM3 (ref 0–0.05)
IMM GRANULOCYTES NFR BLD AUTO: 0.7 % (ref 0–0.5)
LYMPHOCYTES # BLD AUTO: 1.01 10*3/MM3 (ref 0.7–3.1)
LYMPHOCYTES NFR BLD AUTO: 8.4 % (ref 19.6–45.3)
MAGNESIUM SERPL-MCNC: 1.7 MG/DL (ref 1.6–2.4)
MCH RBC QN AUTO: 30 PG (ref 26.6–33)
MCHC RBC AUTO-ENTMCNC: 34.7 G/DL (ref 31.5–35.7)
MCV RBC AUTO: 86.6 FL (ref 79–97)
MONOCYTES # BLD AUTO: 0.35 10*3/MM3 (ref 0.1–0.9)
MONOCYTES NFR BLD AUTO: 2.9 % (ref 5–12)
NEUTROPHILS # BLD AUTO: 10.55 10*3/MM3 (ref 1.7–7)
NEUTROPHILS NFR BLD AUTO: 87.7 % (ref 42.7–76)
NRBC BLD AUTO-RTO: 0.1 /100 WBC (ref 0–0.2)
PLATELET # BLD AUTO: 249 10*3/MM3 (ref 140–450)
PMV BLD AUTO: 10.8 FL (ref 6–12)
POTASSIUM BLD-SCNC: 3.7 MMOL/L (ref 3.5–5.2)
PROT SERPL-MCNC: 7.6 G/DL (ref 6–8.5)
RBC # BLD AUTO: 5.73 10*6/MM3 (ref 4.14–5.8)
SODIUM BLD-SCNC: 138 MMOL/L (ref 136–145)
TROPONIN T SERPL-MCNC: <0.01 NG/ML (ref 0–0.03)
TROPONIN T SERPL-MCNC: <0.01 NG/ML (ref 0–0.03)
WBC NRBC COR # BLD: 12.03 10*3/MM3 (ref 3.4–10.8)
WHOLE BLOOD HOLD SPECIMEN: NORMAL
WHOLE BLOOD HOLD SPECIMEN: NORMAL

## 2019-11-23 PROCEDURE — 84439 ASSAY OF FREE THYROXINE: CPT | Performed by: HOSPITALIST

## 2019-11-23 PROCEDURE — 85025 COMPLETE CBC W/AUTO DIFF WBC: CPT

## 2019-11-23 PROCEDURE — G0378 HOSPITAL OBSERVATION PER HR: HCPCS

## 2019-11-23 PROCEDURE — 25010000003 CEFAZOLIN IN DEXTROSE 2-4 GM/100ML-% SOLUTION: Performed by: INTERNAL MEDICINE

## 2019-11-23 PROCEDURE — 80053 COMPREHEN METABOLIC PANEL: CPT | Performed by: EMERGENCY MEDICINE

## 2019-11-23 PROCEDURE — 87040 BLOOD CULTURE FOR BACTERIA: CPT

## 2019-11-23 PROCEDURE — 25010000002 VANCOMYCIN 10 G RECONSTITUTED SOLUTION: Performed by: PHYSICIAN ASSISTANT

## 2019-11-23 PROCEDURE — 84443 ASSAY THYROID STIM HORMONE: CPT | Performed by: HOSPITALIST

## 2019-11-23 PROCEDURE — 93005 ELECTROCARDIOGRAM TRACING: CPT | Performed by: HOSPITALIST

## 2019-11-23 PROCEDURE — 86140 C-REACTIVE PROTEIN: CPT | Performed by: INTERNAL MEDICINE

## 2019-11-23 PROCEDURE — 84484 ASSAY OF TROPONIN QUANT: CPT | Performed by: HOSPITALIST

## 2019-11-23 PROCEDURE — 93971 EXTREMITY STUDY: CPT

## 2019-11-23 PROCEDURE — 36415 COLL VENOUS BLD VENIPUNCTURE: CPT

## 2019-11-23 PROCEDURE — 99205 OFFICE O/P NEW HI 60 MIN: CPT | Performed by: INTERNAL MEDICINE

## 2019-11-23 PROCEDURE — 93010 ELECTROCARDIOGRAM REPORT: CPT | Performed by: INTERNAL MEDICINE

## 2019-11-23 PROCEDURE — 36415 COLL VENOUS BLD VENIPUNCTURE: CPT | Performed by: EMERGENCY MEDICINE

## 2019-11-23 PROCEDURE — 80048 BASIC METABOLIC PNL TOTAL CA: CPT | Performed by: HOSPITALIST

## 2019-11-23 PROCEDURE — 87040 BLOOD CULTURE FOR BACTERIA: CPT | Performed by: EMERGENCY MEDICINE

## 2019-11-23 PROCEDURE — 99283 EMERGENCY DEPT VISIT LOW MDM: CPT

## 2019-11-23 PROCEDURE — 25010000002 PIPERACILLIN SOD-TAZOBACTAM PER 1 G: Performed by: PHYSICIAN ASSISTANT

## 2019-11-23 PROCEDURE — 83735 ASSAY OF MAGNESIUM: CPT | Performed by: HOSPITALIST

## 2019-11-23 PROCEDURE — 83605 ASSAY OF LACTIC ACID: CPT | Performed by: EMERGENCY MEDICINE

## 2019-11-23 RX ORDER — METOPROLOL TARTRATE 5 MG/5ML
INJECTION INTRAVENOUS
Status: COMPLETED
Start: 2019-11-23 | End: 2019-11-23

## 2019-11-23 RX ORDER — SODIUM CHLORIDE 0.9 % (FLUSH) 0.9 %
10 SYRINGE (ML) INJECTION EVERY 12 HOURS SCHEDULED
Status: DISCONTINUED | OUTPATIENT
Start: 2019-11-23 | End: 2019-11-27 | Stop reason: HOSPADM

## 2019-11-23 RX ORDER — SODIUM CHLORIDE 0.9 % (FLUSH) 0.9 %
10 SYRINGE (ML) INJECTION AS NEEDED
Status: DISCONTINUED | OUTPATIENT
Start: 2019-11-23 | End: 2019-11-27 | Stop reason: HOSPADM

## 2019-11-23 RX ORDER — METOPROLOL TARTRATE 50 MG/1
50 TABLET, FILM COATED ORAL EVERY 12 HOURS SCHEDULED
Status: DISCONTINUED | OUTPATIENT
Start: 2019-11-23 | End: 2019-11-24

## 2019-11-23 RX ORDER — CEFTRIAXONE SODIUM 2 G/50ML
2 INJECTION, SOLUTION INTRAVENOUS EVERY 24 HOURS
Status: DISCONTINUED | OUTPATIENT
Start: 2019-11-23 | End: 2019-11-23

## 2019-11-23 RX ORDER — ACETAMINOPHEN 325 MG/1
650 TABLET ORAL EVERY 6 HOURS PRN
Status: DISCONTINUED | OUTPATIENT
Start: 2019-11-23 | End: 2019-11-27 | Stop reason: HOSPADM

## 2019-11-23 RX ORDER — CEFAZOLIN SODIUM 2 G/100ML
2 INJECTION, SOLUTION INTRAVENOUS EVERY 8 HOURS
Status: DISCONTINUED | OUTPATIENT
Start: 2019-11-23 | End: 2019-11-26

## 2019-11-23 RX ADMIN — TAZOBACTAM SODIUM AND PIPERACILLIN SODIUM 3.38 G: 375; 3 INJECTION, SOLUTION INTRAVENOUS at 13:56

## 2019-11-23 RX ADMIN — VANCOMYCIN HYDROCHLORIDE 2250 MG: 10 INJECTION, POWDER, LYOPHILIZED, FOR SOLUTION INTRAVENOUS at 14:27

## 2019-11-23 RX ADMIN — CEFAZOLIN SODIUM 2 G: 2 INJECTION, SOLUTION INTRAVENOUS at 21:19

## 2019-11-23 RX ADMIN — SODIUM CHLORIDE, PRESERVATIVE FREE 10 ML: 5 INJECTION INTRAVENOUS at 21:22

## 2019-11-23 RX ADMIN — METOPROLOL TARTRATE 5 MG: 5 INJECTION, SOLUTION INTRAVENOUS at 21:18

## 2019-11-23 RX ADMIN — SODIUM CHLORIDE 1000 ML: 9 INJECTION, SOLUTION INTRAVENOUS at 13:59

## 2019-11-23 RX ADMIN — METOPROLOL TARTRATE 5 MG: 5 INJECTION, SOLUTION INTRAVENOUS at 21:36

## 2019-11-23 RX ADMIN — METOPROLOL TARTRATE 50 MG: 25 TABLET ORAL at 21:36

## 2019-11-23 RX ADMIN — SODIUM CHLORIDE 1000 ML: 9 INJECTION, SOLUTION INTRAVENOUS at 16:19

## 2019-11-23 RX ADMIN — ACETAMINOPHEN 650 MG: 325 TABLET, FILM COATED ORAL at 18:40

## 2019-11-24 PROBLEM — I48.91 ATRIAL FIBRILLATION (HCC): Status: ACTIVE | Noted: 2019-11-24

## 2019-11-24 LAB
ANION GAP SERPL CALCULATED.3IONS-SCNC: 19.9 MMOL/L (ref 5–15)
BUN BLD-MCNC: 15 MG/DL (ref 8–23)
BUN/CREAT SERPL: 13.5 (ref 7–25)
CALCIUM SPEC-SCNC: 8 MG/DL (ref 8.6–10.5)
CHLORIDE SERPL-SCNC: 99 MMOL/L (ref 98–107)
CO2 SERPL-SCNC: 20.1 MMOL/L (ref 22–29)
CREAT BLD-MCNC: 1.11 MG/DL (ref 0.76–1.27)
DEPRECATED RDW RBC AUTO: 41.6 FL (ref 37–54)
ERYTHROCYTE [DISTWIDTH] IN BLOOD BY AUTOMATED COUNT: 13 % (ref 12.3–15.4)
GFR SERPL CREATININE-BSD FRML MDRD: 67 ML/MIN/1.73
GLUCOSE BLD-MCNC: 110 MG/DL (ref 65–99)
HCT VFR BLD AUTO: 44.1 % (ref 37.5–51)
HGB BLD-MCNC: 14.3 G/DL (ref 13–17.7)
MCH RBC QN AUTO: 28.4 PG (ref 26.6–33)
MCHC RBC AUTO-ENTMCNC: 32.4 G/DL (ref 31.5–35.7)
MCV RBC AUTO: 87.7 FL (ref 79–97)
PLATELET # BLD AUTO: 221 10*3/MM3 (ref 140–450)
PMV BLD AUTO: 11.2 FL (ref 6–12)
POTASSIUM BLD-SCNC: 3.9 MMOL/L (ref 3.5–5.2)
RBC # BLD AUTO: 5.03 10*6/MM3 (ref 4.14–5.8)
SODIUM BLD-SCNC: 139 MMOL/L (ref 136–145)
T4 FREE SERPL-MCNC: 1.2 NG/DL (ref 0.93–1.7)
TSH SERPL DL<=0.05 MIU/L-ACNC: 0.5 UIU/ML (ref 0.27–4.2)
WBC NRBC COR # BLD: 10.3 10*3/MM3 (ref 3.4–10.8)

## 2019-11-24 PROCEDURE — 93005 ELECTROCARDIOGRAM TRACING: CPT | Performed by: INTERNAL MEDICINE

## 2019-11-24 PROCEDURE — 93010 ELECTROCARDIOGRAM REPORT: CPT | Performed by: INTERNAL MEDICINE

## 2019-11-24 PROCEDURE — 99254 IP/OBS CNSLTJ NEW/EST MOD 60: CPT | Performed by: INTERNAL MEDICINE

## 2019-11-24 PROCEDURE — 25010000003 CEFAZOLIN IN DEXTROSE 2-4 GM/100ML-% SOLUTION: Performed by: INTERNAL MEDICINE

## 2019-11-24 PROCEDURE — G0378 HOSPITAL OBSERVATION PER HR: HCPCS

## 2019-11-24 PROCEDURE — 25010000002 DIGOXIN PER 500 MCG: Performed by: INTERNAL MEDICINE

## 2019-11-24 PROCEDURE — 99214 OFFICE O/P EST MOD 30 MIN: CPT | Performed by: INTERNAL MEDICINE

## 2019-11-24 PROCEDURE — 25010000002 ENOXAPARIN PER 10 MG: Performed by: HOSPITALIST

## 2019-11-24 PROCEDURE — 85027 COMPLETE CBC AUTOMATED: CPT | Performed by: INTERNAL MEDICINE

## 2019-11-24 RX ORDER — DIGOXIN 0.25 MG/ML
250 INJECTION INTRAMUSCULAR; INTRAVENOUS ONCE
Status: COMPLETED | OUTPATIENT
Start: 2019-11-24 | End: 2019-11-24

## 2019-11-24 RX ORDER — SODIUM CHLORIDE 9 MG/ML
75 INJECTION, SOLUTION INTRAVENOUS CONTINUOUS
Status: DISCONTINUED | OUTPATIENT
Start: 2019-11-24 | End: 2019-11-25

## 2019-11-24 RX ORDER — UREA 10 %
3 LOTION (ML) TOPICAL ONCE
Status: COMPLETED | OUTPATIENT
Start: 2019-11-24 | End: 2019-11-24

## 2019-11-24 RX ORDER — DILTIAZEM HYDROCHLORIDE 5 MG/ML
10 INJECTION INTRAVENOUS ONCE
Status: COMPLETED | OUTPATIENT
Start: 2019-11-24 | End: 2019-11-24

## 2019-11-24 RX ORDER — METOPROLOL TARTRATE 50 MG/1
50 TABLET, FILM COATED ORAL EVERY 8 HOURS
Status: DISCONTINUED | OUTPATIENT
Start: 2019-11-24 | End: 2019-11-26

## 2019-11-24 RX ORDER — DIGOXIN 0.25 MG/ML
125 INJECTION INTRAMUSCULAR; INTRAVENOUS EVERY 8 HOURS
Status: COMPLETED | OUTPATIENT
Start: 2019-11-24 | End: 2019-11-24

## 2019-11-24 RX ADMIN — DILTIAZEM HYDROCHLORIDE 10 MG: 5 INJECTION INTRAVENOUS at 03:35

## 2019-11-24 RX ADMIN — METOPROLOL TARTRATE 5 MG: 5 INJECTION, SOLUTION INTRAVENOUS at 00:21

## 2019-11-24 RX ADMIN — METOPROLOL TARTRATE 50 MG: 50 TABLET, FILM COATED ORAL at 16:46

## 2019-11-24 RX ADMIN — CEFAZOLIN SODIUM 2 G: 2 INJECTION, SOLUTION INTRAVENOUS at 03:36

## 2019-11-24 RX ADMIN — METOPROLOL TARTRATE 50 MG: 50 TABLET, FILM COATED ORAL at 23:32

## 2019-11-24 RX ADMIN — CEFAZOLIN SODIUM 2 G: 2 INJECTION, SOLUTION INTRAVENOUS at 12:07

## 2019-11-24 RX ADMIN — SODIUM CHLORIDE 150 ML/HR: 9 INJECTION, SOLUTION INTRAVENOUS at 00:21

## 2019-11-24 RX ADMIN — DIGOXIN 250 MCG: 0.25 INJECTION INTRAMUSCULAR; INTRAVENOUS at 09:55

## 2019-11-24 RX ADMIN — DIGOXIN 125 MCG: 0.25 INJECTION INTRAMUSCULAR; INTRAVENOUS at 16:48

## 2019-11-24 RX ADMIN — CEFAZOLIN SODIUM 2 G: 2 INJECTION, SOLUTION INTRAVENOUS at 19:57

## 2019-11-24 RX ADMIN — DIGOXIN 125 MCG: 0.25 INJECTION INTRAMUSCULAR; INTRAVENOUS at 23:32

## 2019-11-24 RX ADMIN — Medication 3 MG: at 00:48

## 2019-11-24 RX ADMIN — SODIUM CHLORIDE 5 MG/HR: 900 INJECTION, SOLUTION INTRAVENOUS at 22:22

## 2019-11-24 RX ADMIN — METOPROLOL TARTRATE 50 MG: 25 TABLET ORAL at 08:20

## 2019-11-24 RX ADMIN — ENOXAPARIN SODIUM 40 MG: 40 INJECTION SUBCUTANEOUS at 12:07

## 2019-11-24 RX ADMIN — SODIUM CHLORIDE 75 ML/HR: 9 INJECTION, SOLUTION INTRAVENOUS at 22:23

## 2019-11-24 RX ADMIN — SODIUM CHLORIDE 10 MG/HR: 900 INJECTION, SOLUTION INTRAVENOUS at 03:35

## 2019-11-25 ENCOUNTER — APPOINTMENT (OUTPATIENT)
Dept: CARDIOLOGY | Facility: HOSPITAL | Age: 64
End: 2019-11-25

## 2019-11-25 LAB
ANION GAP SERPL CALCULATED.3IONS-SCNC: 14.3 MMOL/L (ref 5–15)
AORTIC DIMENSIONLESS INDEX: 0.6 (DI)
BH CV ECHO MEAS - ACS: 2.4 CM
BH CV ECHO MEAS - AO MAX PG (FULL): 7.2 MMHG
BH CV ECHO MEAS - AO MAX PG: 10.6 MMHG
BH CV ECHO MEAS - AO MEAN PG (FULL): 4 MMHG
BH CV ECHO MEAS - AO MEAN PG: 6 MMHG
BH CV ECHO MEAS - AO V2 MAX: 163 CM/SEC
BH CV ECHO MEAS - AO V2 MEAN: 118 CM/SEC
BH CV ECHO MEAS - AO V2 VTI: 24.9 CM
BH CV ECHO MEAS - BSA(HAYCOCK): 2.4 M^2
BH CV ECHO MEAS - BSA: 2.3 M^2
BH CV ECHO MEAS - BZI_BMI: 30.8 KILOGRAMS/M^2
BH CV ECHO MEAS - BZI_METRIC_HEIGHT: 188 CM
BH CV ECHO MEAS - BZI_METRIC_WEIGHT: 108.9 KG
BH CV ECHO MEAS - EDV(CUBED): 50.7 ML
BH CV ECHO MEAS - EDV(TEICH): 58.1 ML
BH CV ECHO MEAS - EF(CUBED): 69.2 %
BH CV ECHO MEAS - EF(MOD-BP): 54 %
BH CV ECHO MEAS - EF(TEICH): 61.6 %
BH CV ECHO MEAS - ESV(CUBED): 15.6 ML
BH CV ECHO MEAS - ESV(TEICH): 22.3 ML
BH CV ECHO MEAS - FS: 32.4 %
BH CV ECHO MEAS - IVS/LVPW: 0.91
BH CV ECHO MEAS - IVSD: 1 CM
BH CV ECHO MEAS - LV MASS(C)D: 120.8 GRAMS
BH CV ECHO MEAS - LV MASS(C)DI: 51.4 GRAMS/M^2
BH CV ECHO MEAS - LV MAX PG: 3.5 MMHG
BH CV ECHO MEAS - LV MEAN PG: 2 MMHG
BH CV ECHO MEAS - LV V1 MAX: 93.1 CM/SEC
BH CV ECHO MEAS - LV V1 MEAN: 65.1 CM/SEC
BH CV ECHO MEAS - LV V1 VTI: 13.8 CM
BH CV ECHO MEAS - LVIDD: 3.7 CM
BH CV ECHO MEAS - LVIDS: 2.5 CM
BH CV ECHO MEAS - LVOT DIAM: 2.4 CM
BH CV ECHO MEAS - LVPWD: 1.1 CM
BH CV ECHO MEAS - MV DEC SLOPE: 464.3 CM/SEC^2
BH CV ECHO MEAS - MV DEC TIME: 220 SEC
BH CV ECHO MEAS - MV E MAX VEL: 77.6 CM/SEC
BH CV ECHO MEAS - MV MAX PG: 4.4 MMHG
BH CV ECHO MEAS - MV MEAN PG: 3 MMHG
BH CV ECHO MEAS - MV P1/2T MAX VEL: 106.6 CM/SEC
BH CV ECHO MEAS - MV P1/2T: 67.2 MSEC
BH CV ECHO MEAS - MV V2 MAX: 104.1 CM/SEC
BH CV ECHO MEAS - MV V2 MEAN: 73.3 CM/SEC
BH CV ECHO MEAS - MV V2 VTI: 23.7 CM
BH CV ECHO MEAS - MVA P1/2T LCG: 2.1 CM^2
BH CV ECHO MEAS - MVA(P1/2T): 3.3 CM^2
BH CV ECHO MEAS - PA ACC TIME: 0.1 SEC
BH CV ECHO MEAS - PA MAX PG (FULL): 2 MMHG
BH CV ECHO MEAS - PA MAX PG: 3.6 MMHG
BH CV ECHO MEAS - PA PR(ACCEL): 36.3 MMHG
BH CV ECHO MEAS - PA V2 MAX: 94.5 CM/SEC
BH CV ECHO MEAS - PVA(V,A): 3.5 CM^2
BH CV ECHO MEAS - PVA(V,D): 3.5 CM^2
BH CV ECHO MEAS - RAP SYSTOLE: 3 MMHG
BH CV ECHO MEAS - RV MAX PG: 1.5 MMHG
BH CV ECHO MEAS - RV MEAN PG: 1 MMHG
BH CV ECHO MEAS - RV V1 MAX: 62.1 CM/SEC
BH CV ECHO MEAS - RV V1 MEAN: 41.5 CM/SEC
BH CV ECHO MEAS - RV V1 VTI: 9.6 CM
BH CV ECHO MEAS - RVOT AREA: 5.3 CM^2
BH CV ECHO MEAS - RVOT DIAM: 2.6 CM
BH CV ECHO MEAS - SI(CUBED): 14.9 ML/M^2
BH CV ECHO MEAS - SI(TEICH): 15.2 ML/M^2
BH CV ECHO MEAS - SV(CUBED): 35 ML
BH CV ECHO MEAS - SV(RVOT): 51.1 ML
BH CV ECHO MEAS - SV(TEICH): 35.8 ML
BH CV ECHO MEAS - TAPSE (>1.6): 2 CM2
BH CV ECHO MEAS - TR MAX VEL: 157 CM/SEC
BH CV VAS BP LEFT ARM: NORMAL MMHG
BH CV XLRA - RV BASE: 3.75 CM
BH CV XLRA - TDI S': 15 CM/SEC
BUN BLD-MCNC: 10 MG/DL (ref 8–23)
BUN/CREAT SERPL: 12.2 (ref 7–25)
CALCIUM SPEC-SCNC: 7.5 MG/DL (ref 8.6–10.5)
CHLORIDE SERPL-SCNC: 102 MMOL/L (ref 98–107)
CO2 SERPL-SCNC: 22.7 MMOL/L (ref 22–29)
CREAT BLD-MCNC: 0.82 MG/DL (ref 0.76–1.27)
DEPRECATED RDW RBC AUTO: 38.1 FL (ref 37–54)
ERYTHROCYTE [DISTWIDTH] IN BLOOD BY AUTOMATED COUNT: 12.4 % (ref 12.3–15.4)
GFR SERPL CREATININE-BSD FRML MDRD: 95 ML/MIN/1.73
GLUCOSE BLD-MCNC: 147 MG/DL (ref 65–99)
HCT VFR BLD AUTO: 45 % (ref 37.5–51)
HGB BLD-MCNC: 15.8 G/DL (ref 13–17.7)
LEFT ATRIUM VOLUME INDEX: 37 ML/M2
LV EF 2D ECHO EST: 54 %
MCH RBC QN AUTO: 30.2 PG (ref 26.6–33)
MCHC RBC AUTO-ENTMCNC: 35.1 G/DL (ref 31.5–35.7)
MCV RBC AUTO: 85.9 FL (ref 79–97)
PLATELET # BLD AUTO: 163 10*3/MM3 (ref 140–450)
PMV BLD AUTO: 11.3 FL (ref 6–12)
POTASSIUM BLD-SCNC: 3.2 MMOL/L (ref 3.5–5.2)
POTASSIUM BLD-SCNC: 4 MMOL/L (ref 3.5–5.2)
RBC # BLD AUTO: 5.24 10*6/MM3 (ref 4.14–5.8)
SODIUM BLD-SCNC: 139 MMOL/L (ref 136–145)
WBC NRBC COR # BLD: 8.93 10*3/MM3 (ref 3.4–10.8)

## 2019-11-25 PROCEDURE — 93306 TTE W/DOPPLER COMPLETE: CPT

## 2019-11-25 PROCEDURE — 25010000003 CEFAZOLIN IN DEXTROSE 2-4 GM/100ML-% SOLUTION: Performed by: INTERNAL MEDICINE

## 2019-11-25 PROCEDURE — 36415 COLL VENOUS BLD VENIPUNCTURE: CPT | Performed by: HOSPITALIST

## 2019-11-25 PROCEDURE — 93306 TTE W/DOPPLER COMPLETE: CPT | Performed by: INTERNAL MEDICINE

## 2019-11-25 PROCEDURE — 84132 ASSAY OF SERUM POTASSIUM: CPT | Performed by: HOSPITALIST

## 2019-11-25 PROCEDURE — 99232 SBSQ HOSP IP/OBS MODERATE 35: CPT | Performed by: INTERNAL MEDICINE

## 2019-11-25 PROCEDURE — 85027 COMPLETE CBC AUTOMATED: CPT | Performed by: INTERNAL MEDICINE

## 2019-11-25 PROCEDURE — 25010000002 ENOXAPARIN PER 10 MG: Performed by: HOSPITALIST

## 2019-11-25 PROCEDURE — 80048 BASIC METABOLIC PNL TOTAL CA: CPT | Performed by: HOSPITALIST

## 2019-11-25 RX ORDER — POTASSIUM CHLORIDE 7.45 MG/ML
10 INJECTION INTRAVENOUS
Status: DISCONTINUED | OUTPATIENT
Start: 2019-11-25 | End: 2019-11-27 | Stop reason: HOSPADM

## 2019-11-25 RX ORDER — POTASSIUM CHLORIDE 750 MG/1
40 CAPSULE, EXTENDED RELEASE ORAL AS NEEDED
Status: DISCONTINUED | OUTPATIENT
Start: 2019-11-25 | End: 2019-11-27 | Stop reason: HOSPADM

## 2019-11-25 RX ORDER — POTASSIUM CHLORIDE 1.5 G/1.77G
40 POWDER, FOR SOLUTION ORAL AS NEEDED
Status: DISCONTINUED | OUTPATIENT
Start: 2019-11-25 | End: 2019-11-27 | Stop reason: HOSPADM

## 2019-11-25 RX ADMIN — SODIUM CHLORIDE, PRESERVATIVE FREE 10 ML: 5 INJECTION INTRAVENOUS at 20:40

## 2019-11-25 RX ADMIN — CEFAZOLIN SODIUM 2 G: 2 INJECTION, SOLUTION INTRAVENOUS at 04:00

## 2019-11-25 RX ADMIN — SODIUM CHLORIDE 5 MG/HR: 900 INJECTION, SOLUTION INTRAVENOUS at 21:32

## 2019-11-25 RX ADMIN — SODIUM CHLORIDE 10 MG/HR: 900 INJECTION, SOLUTION INTRAVENOUS at 05:05

## 2019-11-25 RX ADMIN — ENOXAPARIN SODIUM 40 MG: 40 INJECTION SUBCUTANEOUS at 11:33

## 2019-11-25 RX ADMIN — CEFAZOLIN SODIUM 2 G: 2 INJECTION, SOLUTION INTRAVENOUS at 11:33

## 2019-11-25 RX ADMIN — CEFAZOLIN SODIUM 2 G: 2 INJECTION, SOLUTION INTRAVENOUS at 20:40

## 2019-11-25 RX ADMIN — POTASSIUM CHLORIDE 40 MEQ: 750 CAPSULE, EXTENDED RELEASE ORAL at 16:02

## 2019-11-25 RX ADMIN — POTASSIUM CHLORIDE 40 MEQ: 750 CAPSULE, EXTENDED RELEASE ORAL at 11:33

## 2019-11-25 RX ADMIN — APIXABAN 5 MG: 5 TABLET, FILM COATED ORAL at 20:40

## 2019-11-25 RX ADMIN — METOPROLOL TARTRATE 50 MG: 50 TABLET, FILM COATED ORAL at 16:02

## 2019-11-25 RX ADMIN — METOPROLOL TARTRATE 50 MG: 50 TABLET, FILM COATED ORAL at 10:35

## 2019-11-26 ENCOUNTER — APPOINTMENT (OUTPATIENT)
Dept: POSTOP/PACU | Facility: HOSPITAL | Age: 64
End: 2019-11-26

## 2019-11-26 ENCOUNTER — ANESTHESIA EVENT (OUTPATIENT)
Dept: POSTOP/PACU | Facility: HOSPITAL | Age: 64
End: 2019-11-26

## 2019-11-26 ENCOUNTER — ANESTHESIA (OUTPATIENT)
Dept: POSTOP/PACU | Facility: HOSPITAL | Age: 64
End: 2019-11-26

## 2019-11-26 VITALS — DIASTOLIC BLOOD PRESSURE: 56 MMHG | SYSTOLIC BLOOD PRESSURE: 96 MMHG | OXYGEN SATURATION: 96 %

## 2019-11-26 LAB
ANION GAP SERPL CALCULATED.3IONS-SCNC: 14.6 MMOL/L (ref 5–15)
BH CV ECHO MEAS - BSA(HAYCOCK): 2.4 M^2
BH CV ECHO MEAS - BSA: 2.3 M^2
BH CV ECHO MEAS - BZI_BMI: 30.8 KILOGRAMS/M^2
BH CV ECHO MEAS - BZI_METRIC_HEIGHT: 188 CM
BH CV ECHO MEAS - BZI_METRIC_WEIGHT: 108.9 KG
BH CV ECHO MEAS - TR MAX VEL: 267 CM/SEC
BH CV VAS BP LEFT ARM: NORMAL MMHG
BUN BLD-MCNC: 10 MG/DL (ref 8–23)
BUN/CREAT SERPL: 11.9 (ref 7–25)
CALCIUM SPEC-SCNC: 8.3 MG/DL (ref 8.6–10.5)
CHLORIDE SERPL-SCNC: 100 MMOL/L (ref 98–107)
CO2 SERPL-SCNC: 25.4 MMOL/L (ref 22–29)
CREAT BLD-MCNC: 0.84 MG/DL (ref 0.76–1.27)
CRP SERPL-MCNC: 9.77 MG/DL (ref 0–0.5)
GFR SERPL CREATININE-BSD FRML MDRD: 93 ML/MIN/1.73
GLUCOSE BLD-MCNC: 125 MG/DL (ref 65–99)
MAGNESIUM SERPL-MCNC: 2.2 MG/DL (ref 1.6–2.4)
POTASSIUM BLD-SCNC: 4.3 MMOL/L (ref 3.5–5.2)
SODIUM BLD-SCNC: 140 MMOL/L (ref 136–145)

## 2019-11-26 PROCEDURE — 93325 DOPPLER ECHO COLOR FLOW MAPG: CPT | Performed by: INTERNAL MEDICINE

## 2019-11-26 PROCEDURE — 93010 ELECTROCARDIOGRAM REPORT: CPT | Performed by: INTERNAL MEDICINE

## 2019-11-26 PROCEDURE — 93005 ELECTROCARDIOGRAM TRACING: CPT | Performed by: INTERNAL MEDICINE

## 2019-11-26 PROCEDURE — 36415 COLL VENOUS BLD VENIPUNCTURE: CPT | Performed by: HOSPITALIST

## 2019-11-26 PROCEDURE — 93320 DOPPLER ECHO COMPLETE: CPT | Performed by: INTERNAL MEDICINE

## 2019-11-26 PROCEDURE — 5A2204Z RESTORATION OF CARDIAC RHYTHM, SINGLE: ICD-10-PCS | Performed by: INTERNAL MEDICINE

## 2019-11-26 PROCEDURE — 83735 ASSAY OF MAGNESIUM: CPT | Performed by: HOSPITALIST

## 2019-11-26 PROCEDURE — 93312 ECHO TRANSESOPHAGEAL: CPT

## 2019-11-26 PROCEDURE — 92960 CARDIOVERSION ELECTRIC EXT: CPT | Performed by: INTERNAL MEDICINE

## 2019-11-26 PROCEDURE — 80048 BASIC METABOLIC PNL TOTAL CA: CPT | Performed by: HOSPITALIST

## 2019-11-26 PROCEDURE — 25010000003 CEFAZOLIN IN DEXTROSE 2-4 GM/100ML-% SOLUTION: Performed by: INTERNAL MEDICINE

## 2019-11-26 PROCEDURE — 93312 ECHO TRANSESOPHAGEAL: CPT | Performed by: INTERNAL MEDICINE

## 2019-11-26 PROCEDURE — 25010000002 PROPOFOL 10 MG/ML EMULSION: Performed by: ANESTHESIOLOGY

## 2019-11-26 PROCEDURE — 99232 SBSQ HOSP IP/OBS MODERATE 35: CPT | Performed by: INTERNAL MEDICINE

## 2019-11-26 PROCEDURE — 86140 C-REACTIVE PROTEIN: CPT | Performed by: HOSPITALIST

## 2019-11-26 PROCEDURE — 93320 DOPPLER ECHO COMPLETE: CPT

## 2019-11-26 PROCEDURE — 92960 CARDIOVERSION ELECTRIC EXT: CPT

## 2019-11-26 PROCEDURE — 93325 DOPPLER ECHO COLOR FLOW MAPG: CPT

## 2019-11-26 RX ORDER — SODIUM CHLORIDE 9 MG/ML
INJECTION, SOLUTION INTRAVENOUS CONTINUOUS PRN
Status: DISCONTINUED | OUTPATIENT
Start: 2019-11-26 | End: 2019-11-26 | Stop reason: SURG

## 2019-11-26 RX ORDER — PROPOFOL 10 MG/ML
VIAL (ML) INTRAVENOUS AS NEEDED
Status: DISCONTINUED | OUTPATIENT
Start: 2019-11-26 | End: 2019-11-26 | Stop reason: SURG

## 2019-11-26 RX ORDER — CEPHALEXIN 500 MG/1
1000 CAPSULE ORAL EVERY 8 HOURS SCHEDULED
Status: DISCONTINUED | OUTPATIENT
Start: 2019-11-26 | End: 2019-11-27 | Stop reason: HOSPADM

## 2019-11-26 RX ORDER — METOPROLOL TARTRATE 50 MG/1
50 TABLET, FILM COATED ORAL EVERY 12 HOURS SCHEDULED
Status: DISCONTINUED | OUTPATIENT
Start: 2019-11-26 | End: 2019-11-27

## 2019-11-26 RX ORDER — PROPOFOL 10 MG/ML
VIAL (ML) INTRAVENOUS CONTINUOUS PRN
Status: DISCONTINUED | OUTPATIENT
Start: 2019-11-26 | End: 2019-11-26

## 2019-11-26 RX ADMIN — CEPHALEXIN 1000 MG: 500 CAPSULE ORAL at 12:59

## 2019-11-26 RX ADMIN — SODIUM CHLORIDE: 9 INJECTION, SOLUTION INTRAVENOUS at 08:09

## 2019-11-26 RX ADMIN — PROPOFOL 30 MG: 10 INJECTION, EMULSION INTRAVENOUS at 08:42

## 2019-11-26 RX ADMIN — PROPOFOL 200 MG: 10 INJECTION, EMULSION INTRAVENOUS at 08:24

## 2019-11-26 RX ADMIN — SODIUM CHLORIDE, PRESERVATIVE FREE 10 ML: 5 INJECTION INTRAVENOUS at 20:19

## 2019-11-26 RX ADMIN — CEPHALEXIN 1000 MG: 500 CAPSULE ORAL at 21:15

## 2019-11-26 RX ADMIN — METOPROLOL TARTRATE 50 MG: 50 TABLET, FILM COATED ORAL at 20:19

## 2019-11-26 RX ADMIN — PROPOFOL 40 MG: 10 INJECTION, EMULSION INTRAVENOUS at 08:33

## 2019-11-26 RX ADMIN — CEFAZOLIN SODIUM 2 G: 2 INJECTION, SOLUTION INTRAVENOUS at 04:55

## 2019-11-26 RX ADMIN — PROPOFOL 30 MG: 10 INJECTION, EMULSION INTRAVENOUS at 08:30

## 2019-11-26 RX ADMIN — PROPOFOL 40 MG: 10 INJECTION, EMULSION INTRAVENOUS at 08:36

## 2019-11-26 RX ADMIN — PROPOFOL 40 MG: 10 INJECTION, EMULSION INTRAVENOUS at 08:39

## 2019-11-26 RX ADMIN — PROPOFOL 30 MG: 10 INJECTION, EMULSION INTRAVENOUS at 08:27

## 2019-11-26 RX ADMIN — METOPROLOL TARTRATE 50 MG: 50 TABLET, FILM COATED ORAL at 00:43

## 2019-11-26 RX ADMIN — APIXABAN 5 MG: 5 TABLET, FILM COATED ORAL at 20:19

## 2019-11-26 RX ADMIN — APIXABAN 5 MG: 5 TABLET, FILM COATED ORAL at 10:40

## 2019-11-26 RX ADMIN — PROPOFOL 40 MG: 10 INJECTION, EMULSION INTRAVENOUS at 08:44

## 2019-11-26 RX ADMIN — METOPROLOL TARTRATE 50 MG: 50 TABLET, FILM COATED ORAL at 07:23

## 2019-11-26 NOTE — ANESTHESIA PREPROCEDURE EVALUATION
Anesthesia Evaluation     Patient summary reviewed and Nursing notes reviewed   no history of anesthetic complications:  NPO Solid Status: > 8 hours  NPO Liquid Status: > 4 hours           Airway   Mallampati: II  TM distance: >3 FB  Neck ROM: full  No difficulty expected  Dental - normal exam     Pulmonary - negative pulmonary ROS and normal exam    breath sounds clear to auscultation  Cardiovascular     ECG reviewed  Rhythm: irregular  Rate: normal    (+) hypertension 2 medications or greater, dysrhythmias Atrial Fib,       Neuro/Psych- negative ROS  GI/Hepatic/Renal/Endo - negative ROS     Musculoskeletal     (+) back pain,   Abdominal  - normal exam   Substance History - negative use     OB/GYN negative ob/gyn ROS         Other - negative ROS                       Anesthesia Plan    ASA 3     MAC     intravenous induction     Anesthetic plan, all risks, benefits, and alternatives have been provided, discussed and informed consent has been obtained with: patient.

## 2019-11-26 NOTE — ANESTHESIA POSTPROCEDURE EVALUATION
"Patient: Marcell De Santiago    Procedure Summary     Date:  11/26/19 Room / Location:  [facility] PACU    Anesthesia Start:  0809 Anesthesia Stop:  0853    Procedures:       ADULT TRANSESOPHAGEAL ECHO (REX) W/ CONT IF NECESSARY PER PROTOCOL      [facility] CARDIOLOGY DEPARTMENT Diagnosis:       (Cardiac Source of Emboli)      (AF)    Scheduled Providers:  Silvestre Carvalho MD Provider:      Anesthesia Type:  MAC ASA Status:  3          Anesthesia Type: MAC  Last vitals  BP   113/72 (11/26/19 0935)   Temp   37.1 °C (98.8 °F) (11/26/19 0935)   Pulse   61 (11/26/19 0935)   Resp   16 (11/26/19 0935)     SpO2   92 % (11/26/19 0935)     Post Anesthesia Care and Evaluation    Patient location during evaluation: bedside  Patient participation: complete - patient participated  Level of consciousness: awake and alert  Pain management: adequate  Airway patency: patent  Anesthetic complications: No anesthetic complications    Cardiovascular status: acceptable  Respiratory status: acceptable  Hydration status: acceptable    Comments: /72 (BP Location: Right arm, Patient Position: Lying)   Pulse 61   Temp 37.1 °C (98.8 °F) (Oral)   Resp 16   Ht 188 cm (74\")   Wt 109 kg (240 lb)   SpO2 92%   BMI 30.81 kg/m²       "

## 2019-11-27 ENCOUNTER — TELEPHONE (OUTPATIENT)
Dept: FAMILY MEDICINE CLINIC | Facility: CLINIC | Age: 64
End: 2019-11-27

## 2019-11-27 VITALS
HEIGHT: 74 IN | SYSTOLIC BLOOD PRESSURE: 139 MMHG | RESPIRATION RATE: 18 BRPM | HEART RATE: 74 BPM | TEMPERATURE: 98 F | BODY MASS INDEX: 30.8 KG/M2 | WEIGHT: 240 LBS | OXYGEN SATURATION: 96 % | DIASTOLIC BLOOD PRESSURE: 84 MMHG

## 2019-11-27 LAB
ANION GAP SERPL CALCULATED.3IONS-SCNC: 10 MMOL/L (ref 5–15)
BUN BLD-MCNC: 13 MG/DL (ref 8–23)
BUN/CREAT SERPL: 16.3 (ref 7–25)
CALCIUM SPEC-SCNC: 8.2 MG/DL (ref 8.6–10.5)
CHLORIDE SERPL-SCNC: 103 MMOL/L (ref 98–107)
CO2 SERPL-SCNC: 25 MMOL/L (ref 22–29)
CREAT BLD-MCNC: 0.8 MG/DL (ref 0.76–1.27)
GFR SERPL CREATININE-BSD FRML MDRD: 98 ML/MIN/1.73
GLUCOSE BLD-MCNC: 109 MG/DL (ref 65–99)
POTASSIUM BLD-SCNC: 3.8 MMOL/L (ref 3.5–5.2)
SODIUM BLD-SCNC: 138 MMOL/L (ref 136–145)

## 2019-11-27 PROCEDURE — 99232 SBSQ HOSP IP/OBS MODERATE 35: CPT | Performed by: INTERNAL MEDICINE

## 2019-11-27 PROCEDURE — 80048 BASIC METABOLIC PNL TOTAL CA: CPT | Performed by: INTERNAL MEDICINE

## 2019-11-27 RX ORDER — METOPROLOL TARTRATE 100 MG/1
100 TABLET ORAL EVERY 12 HOURS SCHEDULED
Status: DISCONTINUED | OUTPATIENT
Start: 2019-11-27 | End: 2019-11-27 | Stop reason: HOSPADM

## 2019-11-27 RX ORDER — METOPROLOL TARTRATE 100 MG/1
100 TABLET ORAL EVERY 12 HOURS SCHEDULED
Qty: 60 TABLET | Refills: 0 | Status: SHIPPED | OUTPATIENT
Start: 2019-11-27 | End: 2019-12-03 | Stop reason: DRUGHIGH

## 2019-11-27 RX ORDER — CEPHALEXIN 500 MG/1
1000 CAPSULE ORAL EVERY 8 HOURS SCHEDULED
Qty: 42 CAPSULE | Refills: 0 | Status: SHIPPED | OUTPATIENT
Start: 2019-11-27 | End: 2019-12-03 | Stop reason: SDUPTHER

## 2019-11-27 RX ADMIN — METOPROLOL TARTRATE 100 MG: 50 TABLET, FILM COATED ORAL at 07:55

## 2019-11-27 RX ADMIN — APIXABAN 5 MG: 5 TABLET, FILM COATED ORAL at 07:55

## 2019-11-27 RX ADMIN — SODIUM CHLORIDE, PRESERVATIVE FREE 10 ML: 5 INJECTION INTRAVENOUS at 07:55

## 2019-11-27 RX ADMIN — CEPHALEXIN 1000 MG: 500 CAPSULE ORAL at 05:33

## 2019-11-27 RX ADMIN — CEPHALEXIN 1000 MG: 500 CAPSULE ORAL at 14:07

## 2019-11-27 NOTE — TELEPHONE ENCOUNTER
PATIENT WAS IN HOSPITAL FOR CELLULITIS AND WENT INTO AFIB. PATIENT HAS ALL NEW MEDICATIONS. PATIENT NEEDS A F/U APPT FOR NEXT WEEK CAN PATIENT BE WORKED IN

## 2019-11-28 LAB
BACTERIA SPEC AEROBE CULT: NORMAL
BACTERIA SPEC AEROBE CULT: NORMAL

## 2019-12-03 ENCOUNTER — OFFICE VISIT (OUTPATIENT)
Dept: FAMILY MEDICINE CLINIC | Facility: CLINIC | Age: 64
End: 2019-12-03

## 2019-12-03 VITALS
BODY MASS INDEX: 30.93 KG/M2 | OXYGEN SATURATION: 98 % | WEIGHT: 241 LBS | DIASTOLIC BLOOD PRESSURE: 90 MMHG | HEIGHT: 74 IN | SYSTOLIC BLOOD PRESSURE: 132 MMHG | HEART RATE: 43 BPM | TEMPERATURE: 98.1 F

## 2019-12-03 DIAGNOSIS — M79.89 SWELLING OF LOWER LEG: ICD-10-CM

## 2019-12-03 DIAGNOSIS — L03.116 LEFT LEG CELLULITIS: Primary | ICD-10-CM

## 2019-12-03 DIAGNOSIS — E78.5 DYSLIPIDEMIA: ICD-10-CM

## 2019-12-03 DIAGNOSIS — I10 ESSENTIAL HYPERTENSION: ICD-10-CM

## 2019-12-03 PROCEDURE — 99214 OFFICE O/P EST MOD 30 MIN: CPT | Performed by: INTERNAL MEDICINE

## 2019-12-03 RX ORDER — LISINOPRIL AND HYDROCHLOROTHIAZIDE 20; 12.5 MG/1; MG/1
1 TABLET ORAL DAILY
Qty: 30 TABLET | Refills: 3 | Status: SHIPPED | OUTPATIENT
Start: 2019-12-03 | End: 2019-12-18 | Stop reason: ALTCHOICE

## 2019-12-03 RX ORDER — SULFAMETHOXAZOLE AND TRIMETHOPRIM 800; 160 MG/1; MG/1
1 TABLET ORAL 2 TIMES DAILY
Qty: 28 TABLET | Refills: 0 | Status: SHIPPED | OUTPATIENT
Start: 2019-12-03 | End: 2019-12-18 | Stop reason: SDUPTHER

## 2019-12-03 RX ORDER — METOPROLOL TARTRATE 50 MG/1
50 TABLET, FILM COATED ORAL 2 TIMES DAILY
Qty: 60 TABLET | Refills: 1
Start: 2019-12-03 | End: 2020-01-08 | Stop reason: SDUPTHER

## 2019-12-03 RX ORDER — CEPHALEXIN 500 MG/1
500 CAPSULE ORAL EVERY 8 HOURS SCHEDULED
Qty: 30 CAPSULE | Refills: 0 | Status: SHIPPED | OUTPATIENT
Start: 2019-12-03 | End: 2019-12-10

## 2019-12-03 NOTE — PROGRESS NOTES
Subjective   Marcell De Santiago is a 64 y.o. male.     History of Present Illness   Current outpatient and discharge medications have been reconciled for the patient.  Reviewed by: Alex Wall MD  Patient was seen for a follow-up from [facility].  Patient was admitted to for severe cellulitis of the left leg.  Patient was admitted and developed atrial fibrillation with ventricular hypertrophy.  Patient was felt to be bacteremic or septic and was placed on IV vancomycin and discharged home on p.o. Keflex.  Patient was seen in the office today with severe erythema of the leg and swelling.  Patient did have a venous Doppler in the hospital that was negative.  Patient had his key flex cut to 500 mg 3 times daily and Bactrim DS was added 1 twice daily.  Patient also had a repeat venous Doppler to confirm a DVT PE was not present.  Patient is following up with cardiology and was placed on Eliquis and 100 mg twice daily of metoprolol.  Metoprolol was being weaned and is taken down to 50 mg twice daily.  Patient pulse was in the 30s to 40s patient was becoming very dizzy.  Patient will follow-up in 2 weeks.  Patient did have a repeat venous Doppler    Dictated utilizing Dragon dictation. If there are questions or for further clarification, please contact me.  The following portions of the patient's history were reviewed and updated as appropriate: allergies, current medications, past family history, past medical history, past social history, past surgical history and problem list.    Review of Systems   Constitutional: Negative for fatigue and fever.   HENT: Positive for congestion. Negative for trouble swallowing.    Eyes: Negative for discharge and visual disturbance.   Respiratory: Negative for choking and shortness of breath.    Cardiovascular: Negative for chest pain and palpitations.   Gastrointestinal: Negative for abdominal pain and blood in stool.   Endocrine: Negative.    Genitourinary: Negative for genital  sores and hematuria.   Musculoskeletal: Negative for gait problem and joint swelling.   Skin: Negative for color change, pallor, rash and wound.        Severe cellulitis of the left leg   Allergic/Immunologic: Positive for environmental allergies. Negative for immunocompromised state.   Neurological: Negative for facial asymmetry and speech difficulty.   Psychiatric/Behavioral: Negative for hallucinations and suicidal ideas.       Objective   Physical Exam   Constitutional: He is oriented to person, place, and time. He appears well-developed and well-nourished.   HENT:   Head: Normocephalic.   Eyes: Conjunctivae are normal. Pupils are equal, round, and reactive to light.   Neck: Normal range of motion. Neck supple.   Cardiovascular: Normal rate, regular rhythm and normal heart sounds. Exam reveals no gallop and no friction rub.   No murmur heard.  Pulmonary/Chest: Effort normal and breath sounds normal. No stridor. No respiratory distress. He has no wheezes. He has no rales. He exhibits no tenderness.   Abdominal: Soft. Bowel sounds are normal.   Musculoskeletal: He exhibits edema, tenderness and deformity.   Neurological: He is alert and oriented to person, place, and time.   Skin: Skin is warm and dry. There is erythema.   Severe cellulitis with swelling of the left leg   Psychiatric: He has a normal mood and affect. His behavior is normal. Judgment and thought content normal.   Nursing note and vitals reviewed.      Assessment/Plan #1 change Keflex to 500 mg 3 times daily #2 add Bactrim DS 1 twice daily #3 recheck venous Doppler 4 return to clinic in 2 weeks  Marcell was seen today for cellulitis.    Diagnoses and all orders for this visit:    Left leg cellulitis  -     Cancel: Doppler Arterial Multi Level Lower Extremity - Bilateral CAR; Future  -     US Venous Doppler Lower Extremity Bilateral (duplex); Future    Essential hypertension    Dyslipidemia    Swelling of lower leg  -     Cancel: Doppler Arterial Multi  Level Lower Extremity - Bilateral CAR; Future  -     Cancel: US Arterial Doppler Lower Extremity Left; Future  -     US Venous Doppler Lower Extremity Bilateral (duplex); Future    Other orders  -     metoprolol tartrate (LOPRESSOR) 50 MG tablet; Take 1 tablet by mouth 2 (Two) Times a Day.  -     cephalexin (KEFLEX) 500 MG capsule; Take 1 capsule by mouth Every 8 (Eight) Hours for 21 doses.  -     sulfamethoxazole-trimethoprim (BACTRIM DS,SEPTRA DS) 800-160 MG per tablet; Take 1 tablet by mouth 2 (Two) Times a Day.  -     lisinopril-hydrochlorothiazide (PRINZIDE,ZESTORETIC) 20-12.5 MG per tablet; Take 1 tablet by mouth Daily.

## 2019-12-04 ENCOUNTER — TELEPHONE (OUTPATIENT)
Dept: FAMILY MEDICINE CLINIC | Facility: CLINIC | Age: 64
End: 2019-12-04

## 2019-12-04 ENCOUNTER — TELEPHONE (OUTPATIENT)
Dept: CARDIOLOGY | Facility: CLINIC | Age: 64
End: 2019-12-04

## 2019-12-04 ENCOUNTER — TRANSCRIBE ORDERS (OUTPATIENT)
Dept: ADMINISTRATIVE | Facility: HOSPITAL | Age: 64
End: 2019-12-04

## 2019-12-04 NOTE — TELEPHONE ENCOUNTER
Patient called the office today and is wanting to switch from Dr. Jj, who saw him in the hospital, to Dr. Pradhan, who was recommended to him by his PCP, Dr. Wall. Is this switch okay with the 2 of you?     Thanks,  Josefa

## 2019-12-04 NOTE — TELEPHONE ENCOUNTER
[employer] called and said we put in order gallego for Patients doppler she said needs to be ordered as Duplex Venus lower extremity Biltateral in order for it to be scheduled in vascular department. I pended the order to you just need to sign it.

## 2019-12-06 ENCOUNTER — OFFICE VISIT (OUTPATIENT)
Dept: CARDIOLOGY | Facility: CLINIC | Age: 64
End: 2019-12-06

## 2019-12-06 VITALS
RESPIRATION RATE: 18 BRPM | WEIGHT: 237.4 LBS | DIASTOLIC BLOOD PRESSURE: 84 MMHG | HEIGHT: 74 IN | BODY MASS INDEX: 30.47 KG/M2 | SYSTOLIC BLOOD PRESSURE: 130 MMHG | HEART RATE: 47 BPM

## 2019-12-06 DIAGNOSIS — I48.0 PAROXYSMAL ATRIAL FIBRILLATION (HCC): Primary | ICD-10-CM

## 2019-12-06 DIAGNOSIS — R06.83 SNORING: ICD-10-CM

## 2019-12-06 DIAGNOSIS — R06.81 WITNESSED EPISODE OF APNEA: ICD-10-CM

## 2019-12-06 DIAGNOSIS — I10 ESSENTIAL HYPERTENSION: ICD-10-CM

## 2019-12-06 DIAGNOSIS — I49.3 PVC (PREMATURE VENTRICULAR CONTRACTION): ICD-10-CM

## 2019-12-06 DIAGNOSIS — R29.818 SUSPECTED SLEEP APNEA: ICD-10-CM

## 2019-12-06 PROCEDURE — 93000 ELECTROCARDIOGRAM COMPLETE: CPT | Performed by: NURSE PRACTITIONER

## 2019-12-06 PROCEDURE — 99214 OFFICE O/P EST MOD 30 MIN: CPT | Performed by: NURSE PRACTITIONER

## 2019-12-06 NOTE — PROGRESS NOTES
Date of Office Visit: 19  Encounter Provider: STEPHANY Trinh  Place of Service: [facility] CARDIOLOGY  Patient Name: Marcell De Santiago  :1955    Chief Complaint   Patient presents with   • Atrial Fibrillation   • Follow-up   :     HPI: Marcell De Santiago is a 64 y.o. male  with history of hypertension, hyperlipidemia, intraventricular contraction, and atrial fibrillation.  He  Will be followed by Dr. Pradhan.      I will see him for the first time today and have reviewed his medical record.     He reports having a stress test over 10 years ago which was normal.  He was admitted in 2019.  He was having fever, chills and body aches at home.  He then noticed sudden left leg swelling.  He was diagnosed with left leg cellulitis and sepsis.  He required IV fluid and IV antibiotic.  During hospitalization he had rapid atrial fibrillation.  He was asymptomatic with that.  Echocardiogram showed normal left ventricular systolic function with an EF of 54% and diastolic function.  There was thickening of the right coronary cusp of the aortic valve.  Transesophageal echocardiogram showed ejection fraction approximately 40-45% with no thrombus.  There was mild mitral regurgitation and negative saline test results.  He was cardioverted to normal sinus rhythm and was placed on metoprolol.  Lisinopril was held to favor metoprolol.  He was started on Eliquis for anticoagulation.  He was discharged on Keflex for 10-day course.    He presents today for hospital discharge follow-up.  He went to see his PCP who cut metoprolol and a half.  He restarted Prinzide only once daily.  He also started him on Bactrim and decreased frequency of Keflex.  He is accompanied by his wife today who reports history of snoring and witnessed apnea in the patient.  He has never had a sleep study and is willing to do that.  He denies palpitation, shortness of breath, dizziness, lightheadedness, chest pain,  "tightness pressure, and fatigue.  He participates in a hockey league.  Patient and his wife occasionally climb mountains and hikes.        No Known Allergies    Past Medical History:   Diagnosis Date   • Atrial fibrillation (CMS/HCC)    • Chronic lumbar pain    • Dyslipidemia 6/5/2018   • Essential hypertension 4/11/2016       Past Surgical History:   Procedure Laterality Date   • COLONOSCOPY  2010   • COLONOSCOPY W/ BIOPSIES  07/18/2013    Dr. Farfan; hyperplastic polyp   • HYDROCELE EXCISION / REPAIR     • INGUINAL HERNIA REPAIR Bilateral 10/02/2007    Dr Hinton   • INGUINAL HERNIA REPAIR  2007    Did a long time ago   • KNEE ARTHROSCOPY Right 1986   • PILONIDAL CYST / SINUS EXCISION  1981   • VASECTOMY  1996         Family and social history reviewed.     Review of Systems   Cardiovascular: Positive for leg swelling.     All other systems were reviewed and are negative          Objective:     Vitals:    12/06/19 1118   BP: 130/84   BP Location: Left arm   Patient Position: Sitting   Pulse: (!) 47   Resp: 18   Weight: 108 kg (237 lb 6.4 oz)   Height: 188 cm (74\")     Body mass index is 30.48 kg/m².    PHYSICAL EXAM:  Physical Exam   Constitutional: He is oriented to person, place, and time. He appears well-developed and well-nourished. No distress.   HENT:   Head: Normocephalic.   Eyes: Conjunctivae are normal.   Neck: Normal range of motion. No JVD present.   Cardiovascular: Normal rate, regular rhythm, normal heart sounds and intact distal pulses.   No murmur heard.  Pulses:       Carotid pulses are 2+ on the right side, and 2+ on the left side.       Radial pulses are 2+ on the right side, and 2+ on the left side.        Posterior tibial pulses are 2+ on the right side, and 2+ on the left side.   Pulmonary/Chest: Effort normal and breath sounds normal. No respiratory distress. He has no wheezes. He has no rhonchi. He has no rales. He exhibits no tenderness.   Abdominal: Soft. Bowel sounds are normal. He " exhibits no distension.   Musculoskeletal: Normal range of motion. He exhibits no edema.   Neurological: He is alert and oriented to person, place, and time.   Skin: Skin is warm, dry and intact. No rash noted. He is not diaphoretic. No cyanosis.   Psychiatric: He has a normal mood and affect. His behavior is normal. Judgment and thought content normal.         ECG 12 Lead  Date/Time: 12/6/2019 11:25 AM  Performed by: Christel Rahman APRN  Authorized by: Christel Rahman APRN   Comparison: compared with previous ECG   Similar to previous ECG  Rhythm: sinus rhythm  Ectopy: unifocal PVCs  Rate: normal  Conduction: conduction normal  T inversion: aVL  QRS axis: normal    Clinical impression: abnormal EKG  Comments: No ischemic changes             Current Outpatient Medications   Medication Sig Dispense Refill   • apixaban (ELIQUIS) 5 MG tablet tablet Take 1 tablet by mouth Every 12 (Twelve) Hours. 60 tablet 5   • cephalexin (KEFLEX) 500 MG capsule Take 1 capsule by mouth Every 8 (Eight) Hours for 21 doses. 30 capsule 0   • lisinopril-hydrochlorothiazide (PRINZIDE,ZESTORETIC) 20-12.5 MG per tablet Take 1 tablet by mouth Daily. 30 tablet 3   • metoprolol tartrate (LOPRESSOR) 50 MG tablet Take 1 tablet by mouth 2 (Two) Times a Day. 60 tablet 1   • sulfamethoxazole-trimethoprim (BACTRIM DS,SEPTRA DS) 800-160 MG per tablet Take 1 tablet by mouth 2 (Two) Times a Day. 28 tablet 0     No current facility-administered medications for this visit.      Assessment:       Diagnosis Plan   1. Paroxysmal atrial fibrillation (CMS/HCC)  ECG 12 Lead   2. Essential hypertension     3. Suspected sleep apnea  Ambulatory Referral to Sleep Medicine   4. Snoring  Ambulatory Referral to Sleep Medicine   5. Witnessed episode of apnea  Ambulatory Referral to Sleep Medicine        Orders Placed This Encounter   Procedures   • Ambulatory Referral to Sleep Medicine     Referral Priority:   Routine     Referral Type:   Consultation     Referral Reason:    Specialty Services Required     Requested Specialty:   Sleep Medicine     Number of Visits Requested:   1   • ECG 12 Lead     This order was created via procedure documentation         Plan:       1.  64-year-old gentleman with paroxysmal atrial fibrillation status post cardioversion.  He was asymptomatic while he was in atrial fibrillation.   Atrial Fibrillation and Atrial Flutter  Assessment  • The patient has paroxysmal atrial fibrillation  • This is non-valvular in etiology  • The patient's CHADS2-VASc score is 1  • A [id number] score of 1 is considered an intermediate risk for a thromboembolic event    Will continue metoprolol and Eliquis at this time.  Consider mobile telemetry device once his infection has cleared to evaluate for recurrence  2.  Hypertension blood pressure appears adequate today  3.  Mild mitral regurgitation on transesophageal echo 11/26/2019  4.  Cellulitis left lower extremity continues on antibiotic  5.  Premature ventricular contraction  6.  History of snoring and witnessed apnea he is agreeable to have sleep study.  Referral placed to sleep medicine      Follow-up in 4 weeks with Dr. Pradhan call with questions or concerns.                  It has been a pleasure to participate in this patient's care.      Thank you,  STEPHANY Trinh      **I used Dragon to dictate this note:**

## 2019-12-09 ENCOUNTER — HOSPITAL ENCOUNTER (OUTPATIENT)
Dept: CARDIOLOGY | Facility: HOSPITAL | Age: 64
Discharge: HOME | End: 2019-12-09
Admitting: INTERNAL MEDICINE

## 2019-12-09 DIAGNOSIS — M79.89 SWELLING OF LOWER LEG: ICD-10-CM

## 2019-12-09 LAB

## 2019-12-09 PROCEDURE — 93970 EXTREMITY STUDY: CPT

## 2019-12-13 ENCOUNTER — TELEPHONE (OUTPATIENT)
Dept: FAMILY MEDICINE CLINIC | Facility: CLINIC | Age: 64
End: 2019-12-13

## 2019-12-13 RX ORDER — CEPHALEXIN 500 MG/1
500 CAPSULE ORAL 4 TIMES DAILY
Qty: 40 CAPSULE | Refills: 0 | Status: SHIPPED | OUTPATIENT
Start: 2019-12-13 | End: 2019-12-23 | Stop reason: SDUPTHER

## 2019-12-13 NOTE — TELEPHONE ENCOUNTER
REFILL ON KEFLEX 500 MG CAP 1 EVERY 8 HOURS     PATIENT WAS PRESCRIBED THIS IN HOSPITAL. WANTS TO KNOW IF HE SHOULD STILL BE TAKING THESE UNTIL HE COMES AND SEES DR. CABRERA

## 2019-12-16 ENCOUNTER — OFFICE VISIT (OUTPATIENT)
Dept: FAMILY MEDICINE CLINIC | Facility: CLINIC | Age: 64
End: 2019-12-16

## 2019-12-16 ENCOUNTER — HOSPITAL ENCOUNTER (EMERGENCY)
Facility: HOSPITAL | Age: 64
Discharge: HOME | End: 2019-12-16
Attending: EMERGENCY MEDICINE | Admitting: EMERGENCY MEDICINE

## 2019-12-16 VITALS
WEIGHT: 234.8 LBS | OXYGEN SATURATION: 99 % | HEART RATE: 80 BPM | TEMPERATURE: 98.3 F | DIASTOLIC BLOOD PRESSURE: 60 MMHG | SYSTOLIC BLOOD PRESSURE: 100 MMHG | BODY MASS INDEX: 30.13 KG/M2 | HEIGHT: 74 IN

## 2019-12-16 VITALS
HEIGHT: 74 IN | HEART RATE: 56 BPM | OXYGEN SATURATION: 97 % | SYSTOLIC BLOOD PRESSURE: 106 MMHG | DIASTOLIC BLOOD PRESSURE: 65 MMHG | TEMPERATURE: 97.5 F | BODY MASS INDEX: 30.16 KG/M2 | RESPIRATION RATE: 16 BRPM | WEIGHT: 235 LBS

## 2019-12-16 DIAGNOSIS — I48.91 ATRIAL FIBRILLATION, UNSPECIFIED TYPE (HCC): ICD-10-CM

## 2019-12-16 DIAGNOSIS — R55 NEAR SYNCOPE: Primary | ICD-10-CM

## 2019-12-16 DIAGNOSIS — L03.116 LEFT LEG CELLULITIS: ICD-10-CM

## 2019-12-16 DIAGNOSIS — E86.0 DEHYDRATION: ICD-10-CM

## 2019-12-16 LAB
ALBUMIN SERPL-MCNC: 4.1 G/DL (ref 3.5–5.2)
ALBUMIN/GLOB SERPL: 1.1 G/DL
ALP SERPL-CCNC: 77 U/L (ref 39–117)
ALT SERPL W P-5'-P-CCNC: 20 U/L (ref 1–41)
ANION GAP SERPL CALCULATED.3IONS-SCNC: 9.3 MMOL/L (ref 5–15)
AST SERPL-CCNC: 19 U/L (ref 1–40)
BASOPHILS # BLD AUTO: 0.03 10*3/MM3 (ref 0–0.2)
BASOPHILS NFR BLD AUTO: 0.8 % (ref 0–1.5)
BILIRUB SERPL-MCNC: 0.5 MG/DL (ref 0.2–1.2)
BUN BLD-MCNC: 12 MG/DL (ref 8–23)
BUN/CREAT SERPL: 10.8 (ref 7–25)
CALCIUM SPEC-SCNC: 9.2 MG/DL (ref 8.6–10.5)
CHLORIDE SERPL-SCNC: 96 MMOL/L (ref 98–107)
CO2 SERPL-SCNC: 25.7 MMOL/L (ref 22–29)
CREAT BLD-MCNC: 1.11 MG/DL (ref 0.76–1.27)
DEPRECATED RDW RBC AUTO: 39.8 FL (ref 37–54)
EOSINOPHIL # BLD AUTO: 0.02 10*3/MM3 (ref 0–0.4)
EOSINOPHIL NFR BLD AUTO: 0.5 % (ref 0.3–6.2)
ERYTHROCYTE [DISTWIDTH] IN BLOOD BY AUTOMATED COUNT: 12.7 % (ref 12.3–15.4)
ERYTHROCYTE [DISTWIDTH] IN BLOOD BY AUTOMATED COUNT: 13.2 % (ref 12.3–15.4)
GFR SERPL CREATININE-BSD FRML MDRD: 67 ML/MIN/1.73
GLOBULIN UR ELPH-MCNC: 3.6 GM/DL
GLUCOSE BLD-MCNC: 113 MG/DL (ref 65–99)
GLUCOSE BLDC-MCNC: 146 MG/DL
HBA1C MFR BLD: 5.7 % (ref 4.8–5.6)
HCT VFR BLD AUTO: 43.3 % (ref 37.5–51)
HCT VFR BLD AUTO: 47.5 % (ref 37.5–51)
HGB BLD-MCNC: 14.2 G/DL (ref 13–17.7)
HGB BLD-MCNC: 15 G/DL (ref 13–17.7)
HOLD SPECIMEN: NORMAL
HOLD SPECIMEN: NORMAL
IMM GRANULOCYTES # BLD AUTO: 0.02 10*3/MM3 (ref 0–0.05)
IMM GRANULOCYTES NFR BLD AUTO: 0.5 % (ref 0–0.5)
LYMPHOCYTES # BLD AUTO: 0.72 10*3/MM3 (ref 0.7–3.1)
LYMPHOCYTES # BLD AUTO: 0.8 10*3/MM3 (ref 0.7–3.1)
LYMPHOCYTES NFR BLD AUTO: 17.3 % (ref 19.6–45.3)
LYMPHOCYTES NFR BLD AUTO: 19.7 % (ref 19.6–45.3)
MAGNESIUM SERPL-MCNC: 2.1 MG/DL (ref 1.6–2.4)
MCH RBC QN AUTO: 27.7 PG (ref 26.6–33)
MCH RBC QN AUTO: 28.4 PG (ref 26.6–33)
MCHC RBC AUTO-ENTMCNC: 31.7 G/DL (ref 31.5–35.7)
MCHC RBC AUTO-ENTMCNC: 32.8 G/DL (ref 31.5–35.7)
MCV RBC AUTO: 86.6 FL (ref 79–97)
MCV RBC AUTO: 87.3 FL (ref 79–97)
MONOCYTES # BLD AUTO: 0.4 10*3/MM3 (ref 0.1–0.9)
MONOCYTES # BLD AUTO: 0.43 10*3/MM3 (ref 0.1–0.9)
MONOCYTES NFR BLD AUTO: 11.8 % (ref 5–12)
MONOCYTES NFR BLD AUTO: 9 % (ref 5–12)
NEUTROPHILS # BLD AUTO: 2.43 10*3/MM3 (ref 1.7–7)
NEUTROPHILS # BLD AUTO: 3.4 10*3/MM3 (ref 1.7–7)
NEUTROPHILS NFR BLD AUTO: 66.7 % (ref 42.7–76)
NEUTROPHILS NFR BLD AUTO: 73.7 % (ref 42.7–76)
NRBC BLD AUTO-RTO: 0 /100 WBC (ref 0–0.2)
PLATELET # BLD AUTO: 203 10*3/MM3 (ref 140–450)
PLATELET # BLD AUTO: 229 10*3/MM3 (ref 140–450)
PMV BLD AUTO: 10.3 FL (ref 6–12)
PMV BLD AUTO: 8.1 FL (ref 6–12)
POTASSIUM BLD-SCNC: 4.9 MMOL/L (ref 3.5–5.2)
PROT SERPL-MCNC: 7.7 G/DL (ref 6–8.5)
RBC # BLD AUTO: 5 10*6/MM3 (ref 4.14–5.8)
RBC # BLD AUTO: 5.44 10*6/MM3 (ref 4.14–5.8)
SODIUM BLD-SCNC: 131 MMOL/L (ref 136–145)
TROPONIN T SERPL-MCNC: <0.01 NG/ML (ref 0–0.03)
WBC NRBC COR # BLD: 3.65 10*3/MM3 (ref 3.4–10.8)
WBC NRBC COR # BLD: 4.6 10*3/MM3 (ref 3.4–10.8)
WHOLE BLOOD HOLD SPECIMEN: NORMAL
WHOLE BLOOD HOLD SPECIMEN: NORMAL

## 2019-12-16 PROCEDURE — 84484 ASSAY OF TROPONIN QUANT: CPT | Performed by: EMERGENCY MEDICINE

## 2019-12-16 PROCEDURE — 99284 EMERGENCY DEPT VISIT MOD MDM: CPT

## 2019-12-16 PROCEDURE — 36415 COLL VENOUS BLD VENIPUNCTURE: CPT | Performed by: NURSE PRACTITIONER

## 2019-12-16 PROCEDURE — 83735 ASSAY OF MAGNESIUM: CPT | Performed by: EMERGENCY MEDICINE

## 2019-12-16 PROCEDURE — 85025 COMPLETE CBC W/AUTO DIFF WBC: CPT | Performed by: NURSE PRACTITIONER

## 2019-12-16 PROCEDURE — 93005 ELECTROCARDIOGRAM TRACING: CPT | Performed by: EMERGENCY MEDICINE

## 2019-12-16 PROCEDURE — 93010 ELECTROCARDIOGRAM REPORT: CPT | Performed by: INTERNAL MEDICINE

## 2019-12-16 PROCEDURE — 85025 COMPLETE CBC W/AUTO DIFF WBC: CPT

## 2019-12-16 PROCEDURE — 99214 OFFICE O/P EST MOD 30 MIN: CPT | Performed by: NURSE PRACTITIONER

## 2019-12-16 PROCEDURE — 80053 COMPREHEN METABOLIC PANEL: CPT | Performed by: EMERGENCY MEDICINE

## 2019-12-16 PROCEDURE — 93005 ELECTROCARDIOGRAM TRACING: CPT

## 2019-12-16 PROCEDURE — 93000 ELECTROCARDIOGRAM COMPLETE: CPT | Performed by: NURSE PRACTITIONER

## 2019-12-16 PROCEDURE — 82962 GLUCOSE BLOOD TEST: CPT | Performed by: NURSE PRACTITIONER

## 2019-12-16 PROCEDURE — 83036 HEMOGLOBIN GLYCOSYLATED A1C: CPT | Performed by: NURSE PRACTITIONER

## 2019-12-16 RX ORDER — SODIUM CHLORIDE 0.9 % (FLUSH) 0.9 %
10 SYRINGE (ML) INJECTION AS NEEDED
Status: DISCONTINUED | OUTPATIENT
Start: 2019-12-16 | End: 2019-12-16 | Stop reason: HOSPADM

## 2019-12-16 RX ADMIN — SODIUM CHLORIDE 1000 ML: 9 INJECTION, SOLUTION INTRAVENOUS at 13:40

## 2019-12-16 NOTE — PROGRESS NOTES
Procedure     ECG 12 Lead  Date/Time: 12/16/2019 9:46 AM  Performed by: Rubia Altamirano APRN  Authorized by: Rubia Altamirano APRN   Comparison: compared with previous ECG from 12/6/2019  Comparison to previous ECG: Sinus bradycardia   Rhythm: sinus rhythm  Rate: normal  T inversion: aVL and III  QRS axis: normal    Clinical impression: non-specific ECG

## 2019-12-16 NOTE — ED NOTES
"Pt stated he went through is morning routine of checking his BP, it was 109/76, DE 70, then went 104/63 then 97/60. Switched arms to retake bp, pt was making a fist, seemed briefly confused, and \"out of it\" didn't respond to his wife, left arm began to tremble. As soon as it started, pt responded. Has had no medication today.      Renetta Beltran, RN  12/16/19 9612    "

## 2019-12-16 NOTE — DISCHARGE INSTRUCTIONS
Increase fluid intake, follow-up with your providers this week as scheduled, return to the emergency department for worsening symptoms as needed.

## 2019-12-16 NOTE — ED PROVIDER NOTES
" EMERGENCY DEPARTMENT ENCOUNTER    CHIEF COMPLAINT  Chief Complaint: near syncope  History given by: patient, patient's spouse  History limited by: nothing  Room Number: 29/29  PMD: Alex Wall MD      HPI:  Pt is a 64 y.o. male who presents complaining of a near syncopal episode that occurred earlier this morning while he was sitting at the kitchen table and checking his blood pressure. The patient's spouse reports the patient was hypotensive with a blood pressure in the 90's/60's so she went to switch the blood pressure cuff to the other arm when the patient had a transient near syncopal episode. The patient's spouse reports the patient had associated pallor and left hand \"trembling\". The patient's spouse reports the patient was seen by his PMD prior to arrival at which time he had an EKG and blood work done. His PMD noticed EKG changes, so he referred to the patient to the ER for further evaluation and treatment. The patient's spouse reports the patient had chills, decreased appetite, and decreased PO intake since yesterday. The patient reports he did not eat anything yet today. The patient denies associated nausea, vomiting, diarrhea, or fever. The patient is currently anticoagulated with Eliquis d/t hx of atrial fibrillation. The patient's spouse reports the patient has not taken any of his home medications yet today. There are no other complaints at this time.    Duration: occurred earlier this morning  Onset: sudden  Timing: episodic  Location: neuro  Quality: near syncope  Intensity/Severity: moderate  Progression: resolved  Associated Symptoms: pallor, left hand \"trembling\", chills, decreased appetite, and decreased PO intake  Aggravating Factors: none specified  Alleviating Factors: none specified  Previous Episodes: none specified  Treatment before arrival: The patient's spouse reports the patient was seen by his PMD prior to arrival at which time he had an EKG and blood work done. His PMD noticed " EKG changes, so he referred to the patient to the ER for further evaluation and treatment.     PAST MEDICAL HISTORY  Active Ambulatory Problems     Diagnosis Date Noted   • Essential hypertension 04/11/2016   • Dyslipidemia 06/05/2018   • Chronic lumbar pain    • Left leg cellulitis 11/23/2019   • Atrial fibrillation (CMS/HCC) 11/24/2019     Resolved Ambulatory Problems     Diagnosis Date Noted   • No Resolved Ambulatory Problems     Past Medical History:   Diagnosis Date   • A-fib (CMS/HCC)    • Cellulitis    • History of low potassium        PAST SURGICAL HISTORY  Past Surgical History:   Procedure Laterality Date   • COLONOSCOPY  2010   • COLONOSCOPY W/ BIOPSIES  07/18/2013    Dr. Farfan; hyperplastic polyp   • HYDROCELE EXCISION / REPAIR     • INGUINAL HERNIA REPAIR Bilateral 10/02/2007    Dr Hinton   • INGUINAL HERNIA REPAIR  2007    Did a long time ago   • KNEE ARTHROSCOPY Right 1986   • PILONIDAL CYST / SINUS EXCISION  1981   • VASECTOMY  1996       FAMILY HISTORY  Family History   Problem Relation Age of Onset   • Thyroid cancer Mother    • Transient ischemic attack Mother    • Diabetes Mother         Thyroid cance   • Heart attack Father    • Aortic aneurysm Father    • Diabetes type II Sister    • Hypothyroidism Sister    • Diabetes type II Brother    • Diabetes Brother         Type 2   • Multiple sclerosis Brother    • Diabetes Brother         Has MS   • Diabetes type II Brother    • Diabetes Sister         Type 1       SOCIAL HISTORY  Social History     Socioeconomic History   • Marital status:      Spouse name: Not on file   • Number of children: Not on file   • Years of education: Not on file   • Highest education level: Not on file   Tobacco Use   • Smoking status: Never Smoker   • Smokeless tobacco: Never Used   Substance and Sexual Activity   • Alcohol use: Yes     Types: 1 Shots of liquor per week     Comment: Kareem if bourbon glass once a month   • Drug use: No   • Sexual activity: Not  Currently     Partners: Female     Birth control/protection: Surgical     Comment: Vasectomy       ALLERGIES  Patient has no known allergies.    REVIEW OF SYSTEMS  Review of Systems   Constitutional: Positive for appetite change (decreased) and chills. Negative for activity change and fever.   HENT: Negative for congestion and sore throat.    Eyes: Negative.    Respiratory: Negative for cough and shortness of breath.    Cardiovascular: Negative for chest pain and leg swelling.   Gastrointestinal: Negative for abdominal pain, diarrhea, nausea and vomiting.   Endocrine: Negative.    Genitourinary: Negative for decreased urine volume and dysuria.   Musculoskeletal: Negative for neck pain.   Skin: Positive for pallor. Negative for rash and wound.   Allergic/Immunologic: Negative.    Neurological: Positive for tremors (of left hand) and syncope (near). Negative for weakness, numbness and headaches.   Hematological: Negative.    Psychiatric/Behavioral: Negative.    All other systems reviewed and are negative.      PHYSICAL EXAM  ED Triage Vitals [12/16/19 1026]   Temp Heart Rate Resp BP SpO2   97.5 °F (36.4 °C) 78 16 -- 97 %      Temp src Heart Rate Source Patient Position BP Location FiO2 (%)   Tympanic Monitor -- -- --       Physical Exam   Constitutional: He is oriented to person, place, and time and well-developed, well-nourished, and in no distress. No distress.   HENT:   Head: Normocephalic.   Mouth/Throat: Mucous membranes are normal.   Eyes: EOM are normal.   Neck: Normal range of motion.   Cardiovascular: Normal rate and regular rhythm. Exam reveals no gallop and no friction rub.   No murmur heard.  Pulmonary/Chest: Effort normal. No respiratory distress. He has no wheezes. He has no rhonchi. He has no rales.   Abdominal: Soft. He exhibits no distension. There is no tenderness. There is no guarding.   Musculoskeletal: Normal range of motion. He exhibits no deformity.   Neurological: He is alert and oriented to  person, place, and time. GCS score is 15.   moving all extremities, no focal deficits   Skin: Skin is warm and dry. He is not diaphoretic.   Psychiatric:   Calm, cooperative   Nursing note and vitals reviewed.      LAB RESULTS  Lab Results (last 24 hours)     Procedure Component Value Units Date/Time    CBC & Differential [[id number]] Collected:  12/16/19 0935    Specimen:  Blood Updated:  12/16/19 0956    Narrative:       The following orders were created for panel order CBC & Differential.  Procedure                               Abnormality         Status                     ---------                               -----------         ------                     CBC Auto Differential[[id number]]        Abnormal            Final result                 Please view results for these tests on the individual orders.    Hemoglobin A1c [[id number]]  (Abnormal) Collected:  12/16/19 0935    Specimen:  Blood Updated:  12/16/19 1412     Hemoglobin A1C 5.70 %     Narrative:       Hemoglobin A1C Ranges:    Increased Risk for Diabetes  5.7% to 6.4%  Diabetes                     >= 6.5%  Diabetic Goal                < 7.0%    CBC Auto Differential [[id number]]  (Abnormal) Collected:  12/16/19 0935    Specimen:  Blood Updated:  12/16/19 0956     WBC 4.60 10*3/mm3      RBC 5.44 10*6/mm3      Hemoglobin 15.0 g/dL      Hematocrit 47.5 %      RDW 13.2 %      MCV 87.3 fL      MCH 27.7 pg      MCHC 31.7 g/dL      MPV 8.1 fL      Platelets 229 10*3/mm3      Neutrophil % 73.7 %      Lymphocyte % 17.3 %      Monocyte % 9.0 %      Neutrophils, Absolute 3.40 10*3/mm3      Lymphocytes, Absolute 0.80 10*3/mm3      Monocytes, Absolute 0.40 10*3/mm3     GLUCOSE, FINGERSTICK [[id number]] Collected:  12/16/19 0950    Specimen:  Capillary Blood Updated:  12/16/19 0951     Glucose 146 mg/dL     CBC & Differential [[id number]] Collected:  12/16/19 1232    Specimen:  Blood Updated:  12/16/19 1245    Narrative:       The following orders were created for  panel order CBC & Differential.  Procedure                               Abnormality         Status                     ---------                               -----------         ------                     CBC Auto Differential[[id number]]        Normal              Final result                 Please view results for these tests on the individual orders.    Comprehensive Metabolic Panel [[id number]]  (Abnormal) Collected:  12/16/19 1232    Specimen:  Blood Updated:  12/16/19 1311     Glucose 113 mg/dL      BUN 12 mg/dL      Creatinine 1.11 mg/dL      Sodium 131 mmol/L      Potassium 4.9 mmol/L      Chloride 96 mmol/L      CO2 25.7 mmol/L      Calcium 9.2 mg/dL      Total Protein 7.7 g/dL      Albumin 4.10 g/dL      ALT (SGPT) 20 U/L      AST (SGOT) 19 U/L      Alkaline Phosphatase 77 U/L      Total Bilirubin 0.5 mg/dL      eGFR Non African Amer 67 mL/min/1.73      Globulin 3.6 gm/dL      A/G Ratio 1.1 g/dL      BUN/Creatinine Ratio 10.8     Anion Gap 9.3 mmol/L     Narrative:       GFR Normal >60  Chronic Kidney Disease <60  Kidney Failure <15      Magnesium [[id number]]  (Normal) Collected:  12/16/19 1232    Specimen:  Blood Updated:  12/16/19 1308     Magnesium 2.1 mg/dL     Troponin [[id number]]  (Normal) Collected:  12/16/19 1232    Specimen:  Blood Updated:  12/16/19 1308     Troponin T <0.010 ng/mL     Narrative:       Troponin T Reference Range:  <= 0.03 ng/mL-   Negative for AMI  >0.03 ng/mL-     Abnormal for myocardial necrosis.  Clinicians would have to utilize clinical acumen, EKG, Troponin and serial changes to determine if it is an Acute Myocardial Infarction or myocardial injury due to an underlying chronic condition.     CBC Auto Differential [[id number]]  (Normal) Collected:  12/16/19 1232    Specimen:  Blood Updated:  12/16/19 1245     WBC 3.65 10*3/mm3      RBC 5.00 10*6/mm3      Hemoglobin 14.2 g/dL      Hematocrit 43.3 %      MCV 86.6 fL      MCH 28.4 pg      MCHC 32.8 g/dL      RDW 12.7 %       RDW-SD 39.8 fl      MPV 10.3 fL      Platelets 203 10*3/mm3      Neutrophil % 66.7 %      Lymphocyte % 19.7 %      Monocyte % 11.8 %      Eosinophil % 0.5 %      Basophil % 0.8 %      Immature Grans % 0.5 %      Neutrophils, Absolute 2.43 10*3/mm3      Lymphocytes, Absolute 0.72 10*3/mm3      Monocytes, Absolute 0.43 10*3/mm3      Eosinophils, Absolute 0.02 10*3/mm3      Basophils, Absolute 0.03 10*3/mm3      Immature Grans, Absolute 0.02 10*3/mm3      nRBC 0.0 /100 WBC           I ordered the above labs and reviewed the results    PROCEDURES  Procedures    EKG          EKG time: 1119  Rhythm/Rate: SR, 64  P waves and DC: nml; nml  QRS, axis: nml; nml   ST and T waves: isolated TWI in III, no STEMI     Interpreted Contemporaneously by me, independently viewed  There are no emergent changes when compared to prior from 11/26/2019.      PROGRESS AND CONSULTS  ED Course as of Dec 16 1828   Mon Dec 16, 2019   1420 Patient came in today for evaluation of a near syncopal episode at home with a measured low blood pressure in the 90s systolic.  Patient and wife state that he did not eat or drink well yesterday and had not had anything to eat or drink today prior to the episode.  Currently reports feeling back to normal other than feeling tired.  The patient does not have a description that would suggest a seizure at this time, and I do feel like this is likely a brief hypotensive episode, likely secondary to dehydration.  Work-up today has been unremarkable with a 20 point systolic blood pressure drop from lying to standing.  Patient has been given IV fluids and laboratory work-up was unremarkable.  EKG has been reviewed from the ED, the outpatient visit, and recent cardiology visits, and there are no acute emergent changes noted.  Patient can follow-up with his primary care provider and his cardiologist for reevaluation of this week.  Advised return to the emergency department for worsening symptoms as needed.  I advised  withholding his lisinopril until he sees his primary care provider on Wednesday, advised continuing with metoprolol to help prevent atrial fibrillation with RVR.    [DC]      ED Course User Index  [DC] Rafael Hager MD      1049 Ordered blood work and EKG for further evaluation.    1120 Placed order to check orthostatic vitals.    1331 Ordered IV Fluids for hydration.    1355 Rechecked the patient who is resting comfortably and in NAD. Patient is stable. HR- 86 Temp- 97.5 °F (36.4 °C) (Tympanic) O2 sat- 99% on RA. Informed the patient and his spouse of the unremarkable EKG and labs. Discussed the plan for discharge with instructions to f/u with his PMD for further evaluation and management. Strict RTER warnings given. They understand and agree with the plan, all questions answered.      MEDICAL DECISION MAKING  Results were reviewed/discussed with the patient and they were also made aware of online access. Pt also made aware that some labs, such as cultures, will not be resulted during ER visit and follow up with PMD is necessary.     MDM  Number of Diagnoses or Management Options     Amount and/or Complexity of Data Reviewed  Clinical lab tests: ordered and reviewed (Sodium: 113, Troponin is <0.010, Magnesium is 2.1, and WBC is 3.65)  Tests in the medicine section of CPT®: ordered and reviewed (See procedure notes for EKG interpretation.)  Decide to obtain previous medical records or to obtain history from someone other than the patient: yes  Obtain history from someone other than the patient: yes (Patient's spouse)  Review and summarize past medical records: yes (The patient was seen by his PMD earlier today for near syncope. He had a CBC and EKG done at that time. He was referred to the ER for further evaluation and treatment.)    Patient Progress  Patient progress: stable         DIAGNOSIS  Final diagnoses:   Near syncope   Dehydration       DISPOSITION  DISCHARGE    Patient discharged in stable  condition.    Reviewed implications of results, diagnosis, meds, responsibility to follow up, warning signs and symptoms of possible worsening, potential complications and reasons to return to ER, including any new or worsening symptoms.    Patient/Family voiced understanding of above instructions.    Discussed plan for discharge, as there is no emergent indication for admission. Patient referred to primary care provider for BP management due to today's BP. Pt/family is agreeable and understands need for follow up and repeat testing.  Pt is aware that discharge does not mean that nothing is wrong but it indicates no emergency is present that requires admission and they must continue care with follow-up as given below or physician of their choice.     FOLLOW-UP  [facility]  [address] [facility] [address] [phone]    As needed, If symptoms worsen    Alex Wall MD  [facility] [facility] [id number] [phone]    Go on 12/18/2019  as scheduled         Medication List      No changes were made to your prescriptions during this visit.           Latest Documented Vital Signs:  As of 6:28 PM  BP- 106/65 HR- 56 Temp- 97.5 °F (36.4 °C) (Tympanic) O2 sat- 97%    --  Documentation assistance provided by brent Wilson for Dr. Alley MD.  Information recorded by the scribe was done at my direction and has been verified and validated by me.     Jesica Wilson  12/16/19 6633       Rafael Hager MD  12/16/19 7340

## 2019-12-16 NOTE — PROGRESS NOTES
Subjective   Marcell De Santiago is a 64 y.o. male.     History of Present Illness   C/o low BP, shakiness, lethargy, states his wife called 911 this morning, he started to pass out, wife hung up the phone, called our office and came in today, states episode while taking BP this morning, wife states unresponsive this morning, does have hx of afib. Had episode while in hospital in 11/19 for leg cellulitis and sepsis, he was started on metoprolol and eliquis, last visit with cardiology 12/6/19. Pending sleep study for snoring. States BP low in the past couple of weeks, states HR at home in 40-60s. Taking metoprolol 50mg bid states BP usually 115s-130s/80s. Also taking lisinopril-hctz 20-12.5mg daily. His wife states he had little appetite yesterday, tired, chills. States BP at this am 97/60, pale, tremble in left arm, states wife elevated legs, denies LOC. States HR 70 this am. Denies cough, fever. Denies CP, SOA, LE edema, denies dizziness, HA. Denies confusion, weakness, slurred speech, vision changes.  He was in hospital 11/2319 for cellulitis, currently taking keflex 500mg qid, bactrim DS bid. Does not see wound care, denies hx of DM, denies bite or wound. States leg wound much improved, denies drainage or fever.       The following portions of the patient's history were reviewed and updated as appropriate: allergies, current medications, past family history, past medical history, past social history, past surgical history and problem list.    Review of Systems   Constitutional: Positive for appetite change and fatigue. Negative for chills, diaphoresis and fever.   Respiratory: Negative for cough, shortness of breath and wheezing.    Cardiovascular: Negative for chest pain, palpitations and leg swelling.   Gastrointestinal: Negative for abdominal pain.   Musculoskeletal: Negative for arthralgias and myalgias.   Neurological: Negative for dizziness, tremors, seizures, syncope, facial asymmetry, speech difficulty,  weakness, light-headedness, numbness, headache, memory problem and confusion.   All other systems reviewed and are negative.      Objective   Physical Exam   Constitutional: He is oriented to person, place, and time. He appears well-developed and well-nourished.   HENT:   Head: Normocephalic.   Eyes: Pupils are equal, round, and reactive to light.   Neck: Normal range of motion.   Cardiovascular: Normal rate, regular rhythm, normal heart sounds and intact distal pulses.   Pulses:       Radial pulses are 2+ on the right side, and 2+ on the left side.        Dorsalis pedis pulses are 2+ on the right side, and 2+ on the left side.        Posterior tibial pulses are 2+ on the right side, and 2+ on the left side.   Pulmonary/Chest: Effort normal and breath sounds normal. No respiratory distress. He has no decreased breath sounds. He has no wheezes. He has no rhonchi. He has no rales.   Musculoskeletal: Normal range of motion.   Lymphadenopathy:     He has no cervical adenopathy.   Neurological: He is alert and oriented to person, place, and time. He has normal strength. No cranial nerve deficit or sensory deficit. He displays a negative Romberg sign.   Skin: Skin is warm and dry. There is erythema.        Psychiatric: He has a normal mood and affect. His behavior is normal.   Nursing note and vitals reviewed.        Assessment/Plan   Marcell was seen today for loss of speech, trembling, hypotension, loss of consciousness and lathargic.    Diagnoses and all orders for this visit:    Near syncope  -     CBC & Differential  -     Hemoglobin A1c  -     CBC Auto Differential  -     ECG 12 Lead  -     GLUCOSE, FINGERSTICK; Future  -     GLUCOSE, FINGERSTICK    Atrial fibrillation, unspecified type (CMS/HCC)  -     CBC & Differential  -     Hemoglobin A1c  -     CBC Auto Differential  -     ECG 12 Lead    Left leg cellulitis  -     CBC & Differential  -     Hemoglobin A1c  -     CBC Auto Differential      ekg today,   Cbc, a1c, BS  today, WNL.   Patient sent to [facility] ER at this time for near syncope, ekg changes, report called, ambulance deferred his wife is with him to drive,   Cont f/u in office this week as scheduled.   Educated about s/s stroke, call 911 or ER with any worsening symptoms.   Increase fluid intake, get plenty of rest.   Patient agrees with plan of care and understands instructions. Call if worsening symptoms or any problems or concerns.

## 2019-12-16 NOTE — PATIENT INSTRUCTIONS
ekg today,   Cbc, a1c, BS today, WNL.   Patient sent to [facility] ER at this time for near syncope, ekg changes, report called, ambulance deferred his wife is with him to drive,   Cont f/u in office this week as scheduled.   Educated about s/s stroke, call 911 or ER with any worsening symptoms.   Increase fluid intake, get plenty of rest.   Patient agrees with plan of care and understands instructions. Call if worsening symptoms or any problems or concerns.

## 2019-12-18 ENCOUNTER — OFFICE VISIT (OUTPATIENT)
Dept: FAMILY MEDICINE CLINIC | Facility: CLINIC | Age: 64
End: 2019-12-18

## 2019-12-18 VITALS
RESPIRATION RATE: 16 BRPM | WEIGHT: 238 LBS | OXYGEN SATURATION: 99 % | TEMPERATURE: 98.2 F | BODY MASS INDEX: 30.54 KG/M2 | SYSTOLIC BLOOD PRESSURE: 110 MMHG | HEIGHT: 74 IN | DIASTOLIC BLOOD PRESSURE: 60 MMHG | HEART RATE: 52 BPM

## 2019-12-18 DIAGNOSIS — I48.0 PAROXYSMAL ATRIAL FIBRILLATION (HCC): ICD-10-CM

## 2019-12-18 DIAGNOSIS — L03.116 LEFT LEG CELLULITIS: Primary | ICD-10-CM

## 2019-12-18 DIAGNOSIS — I10 ESSENTIAL HYPERTENSION: ICD-10-CM

## 2019-12-18 PROCEDURE — 99214 OFFICE O/P EST MOD 30 MIN: CPT | Performed by: INTERNAL MEDICINE

## 2019-12-18 RX ORDER — LISINOPRIL 40 MG/1
40 TABLET ORAL DAILY
Qty: 30 TABLET | Refills: 3 | Status: SHIPPED | OUTPATIENT
Start: 2019-12-18 | End: 2019-12-31 | Stop reason: SDUPTHER

## 2019-12-18 RX ORDER — SULFAMETHOXAZOLE AND TRIMETHOPRIM 800; 160 MG/1; MG/1
1 TABLET ORAL 2 TIMES DAILY
Qty: 28 TABLET | Refills: 0 | Status: SHIPPED | OUTPATIENT
Start: 2019-12-18 | End: 2019-12-31 | Stop reason: SDUPTHER

## 2019-12-19 NOTE — PROGRESS NOTES
Subjective   Marcell De Santiago is a 64 y.o. male.     History of Present Illness   Patient was seen for cellulitis of the left leg.  Patient is taking Bactrim and Keflex.  His leg is much improved but still needs additional 2 weeks of therapy.  Patient's blood pressures been running in the 130s over 70s.  Patient does have paroxysmal atrial fibrillation.  Is being treated with apixaban and rate control with metoprolol.  The following portions of the patient's history were reviewed and updated as appropriate: allergies, current medications, past family history, past medical history, past social history, past surgical history and problem list.    Review of Systems   Constitutional: Negative for fatigue and fever.   HENT: Positive for congestion. Negative for trouble swallowing.    Eyes: Negative for discharge and visual disturbance.   Respiratory: Negative for choking and shortness of breath.    Cardiovascular: Negative for chest pain and palpitations.   Gastrointestinal: Negative for abdominal pain and blood in stool.   Endocrine: Negative.    Genitourinary: Negative for genital sores and hematuria.   Musculoskeletal: Negative for gait problem and joint swelling.   Skin: Negative for color change, pallor, rash and wound.        Cellulitis left leg   Allergic/Immunologic: Positive for environmental allergies. Negative for immunocompromised state.   Neurological: Negative for facial asymmetry and speech difficulty.   Psychiatric/Behavioral: Negative for hallucinations and suicidal ideas.       Objective   Physical Exam   Constitutional: He is oriented to person, place, and time. He appears well-developed and well-nourished.   HENT:   Head: Normocephalic.   Eyes: Pupils are equal, round, and reactive to light. Conjunctivae are normal.   Neck: Normal range of motion. Neck supple.   Cardiovascular: Normal rate, regular rhythm and normal heart sounds.   Pulmonary/Chest: Effort normal and breath sounds normal.   Abdominal:  Soft. Bowel sounds are normal.   Musculoskeletal: Normal range of motion.   Neurological: He is alert and oriented to person, place, and time.   Skin: Skin is warm and dry.   Cellulitis left leg   Psychiatric: He has a normal mood and affect. His behavior is normal. Judgment and thought content normal.   Nursing note and vitals reviewed.      Assessment/Plan #1 continue Bactrim and Keflex for additional 2 weeks #2 continue to monitor blood pressure #3 return to clinic in 2 weeks  Marcell was seen today for follow-up.    Diagnoses and all orders for this visit:    Left leg cellulitis    Essential hypertension    Paroxysmal atrial fibrillation (CMS/HCC)    Other orders  -     sulfamethoxazole-trimethoprim (BACTRIM DS,SEPTRA DS) 800-160 MG per tablet; Take 1 tablet by mouth 2 (Two) Times a Day.  -     lisinopril (PRINIVIL,ZESTRIL) 40 MG tablet; Take 1 tablet by mouth Daily.

## 2019-12-23 ENCOUNTER — TELEPHONE (OUTPATIENT)
Dept: FAMILY MEDICINE CLINIC | Facility: CLINIC | Age: 64
End: 2019-12-23

## 2019-12-23 RX ORDER — CEPHALEXIN 500 MG/1
500 CAPSULE ORAL 4 TIMES DAILY
Qty: 20 CAPSULE | Refills: 0 | Status: SHIPPED | OUTPATIENT
Start: 2019-12-23 | End: 2019-12-23 | Stop reason: SDUPTHER

## 2019-12-23 RX ORDER — CEPHALEXIN 500 MG/1
500 CAPSULE ORAL 4 TIMES DAILY
Qty: 28 CAPSULE | Refills: 0 | Status: SHIPPED | OUTPATIENT
Start: 2019-12-23 | End: 2019-12-31 | Stop reason: SDUPTHER

## 2019-12-23 NOTE — TELEPHONE ENCOUNTER
Pt was supposed to stay on keflex until he see's Dr. Wall on 12/31 but he doesn't have enough to last until then. Needs a refill

## 2019-12-31 ENCOUNTER — TELEPHONE (OUTPATIENT)
Dept: SOCIAL WORK | Facility: HOSPITAL | Age: 64
End: 2019-12-31

## 2019-12-31 RX ORDER — LISINOPRIL 40 MG/1
40 TABLET ORAL DAILY
Qty: 90 TABLET | Refills: 3 | Status: SHIPPED | OUTPATIENT
Start: 2019-12-31 | End: 2020-02-11 | Stop reason: SDUPTHER

## 2019-12-31 RX ORDER — CEPHALEXIN 500 MG/1
500 CAPSULE ORAL 4 TIMES DAILY
Qty: 28 CAPSULE | Refills: 0 | Status: SHIPPED | OUTPATIENT
Start: 2019-12-31 | End: 2020-01-07 | Stop reason: SDUPTHER

## 2019-12-31 RX ORDER — SULFAMETHOXAZOLE AND TRIMETHOPRIM 800; 160 MG/1; MG/1
1 TABLET ORAL 2 TIMES DAILY
Qty: 28 TABLET | Refills: 0 | Status: SHIPPED | OUTPATIENT
Start: 2019-12-31 | End: 2020-01-14 | Stop reason: SDUPTHER

## 2019-12-31 NOTE — TELEPHONE ENCOUNTER
[facility] received a call back from pt's pharmacy, Gita.  She states that she has now been able to apply the coupon provided and pt will be able to receive a refund.  Call placed to pt's wife and updated her.  She states that she will go to the pharmacy roya and speak with Gita.

## 2019-12-31 NOTE — TELEPHONE ENCOUNTER
Call received from MsDianne Anyi.  She states that the pharmacy is telling her that due to the amount of time since the medication was filled, they are not able to refund her money.  [facility] placed a call to the pharmacy and spoke with pharmacist Gita who states that [employer] software will not allow it.  She will call their tech support to see if there is any way to do so.

## 2019-12-31 NOTE — TELEPHONE ENCOUNTER
"HR CCP received notice of pt's insurance not covering his first fill of Eliquis.  Call placed to pt's pharmacy ([facility]) and spoke with the pharmacist.  Provided information for \"Free 30 Day Trial Offer\" coupon.  She ran it and it was covered 100%.  The pharmacist states that she will refund pt's $449 which they paid privately.  Call placed to pt's home number and left a VM for call back.    Addendum:  Call back received from Ms. TerryAnyi.  She states that she will go to the pharmacy now to  her refund.   "

## 2020-01-07 ENCOUNTER — TELEPHONE (OUTPATIENT)
Dept: FAMILY MEDICINE CLINIC | Facility: CLINIC | Age: 65
End: 2020-01-07

## 2020-01-07 RX ORDER — CEPHALEXIN 500 MG/1
500 CAPSULE ORAL 4 TIMES DAILY
Qty: 28 CAPSULE | Refills: 0 | Status: SHIPPED | OUTPATIENT
Start: 2020-01-07 | End: 2020-01-14 | Stop reason: SDUPTHER

## 2020-01-07 NOTE — TELEPHONE ENCOUNTER
HE WAS SUPPOSED TO BE TAKING THE KEFLEX FOR 2 WEEKS BUT HE WILL RUN OUT TOMM. PLEASE ADVISE . THIS WAS PRESCRIBED ON 12-31-19

## 2020-01-07 NOTE — TELEPHONE ENCOUNTER
Patient actually was spoken to  on 12/31 and had 1 more week of bactrim but is a week short on keflex qid. Can we resend over 28 pills to [facility] so patient will have enough to finish the extra 2nd week that melissa told them to due (cellulitis leg)

## 2020-01-08 ENCOUNTER — OFFICE VISIT (OUTPATIENT)
Dept: CARDIOLOGY | Facility: CLINIC | Age: 65
End: 2020-01-08

## 2020-01-08 VITALS
HEIGHT: 74 IN | BODY MASS INDEX: 30.67 KG/M2 | HEART RATE: 44 BPM | WEIGHT: 239 LBS | DIASTOLIC BLOOD PRESSURE: 70 MMHG | SYSTOLIC BLOOD PRESSURE: 120 MMHG

## 2020-01-08 DIAGNOSIS — I10 ESSENTIAL HYPERTENSION: ICD-10-CM

## 2020-01-08 DIAGNOSIS — I48.0 PAROXYSMAL ATRIAL FIBRILLATION (HCC): Primary | ICD-10-CM

## 2020-01-08 DIAGNOSIS — I49.3 PVC (PREMATURE VENTRICULAR CONTRACTION): ICD-10-CM

## 2020-01-08 PROCEDURE — 99213 OFFICE O/P EST LOW 20 MIN: CPT | Performed by: INTERNAL MEDICINE

## 2020-01-08 PROCEDURE — 93000 ELECTROCARDIOGRAM COMPLETE: CPT | Performed by: INTERNAL MEDICINE

## 2020-01-08 RX ORDER — SULFAMETHOXAZOLE AND TRIMETHOPRIM 800; 160 MG/1; MG/1
TABLET ORAL
Qty: 28 TABLET | Refills: 0 | OUTPATIENT
Start: 2020-01-08

## 2020-01-08 NOTE — PROGRESS NOTES
Date of Office Visit: 20  Encounter Provider: Florencio Pradhan MD  Place of Service: [facility] CARDIOLOGY  Patient Name: Marcell De Santiago  :1955  Referral Provider:Joshua Jj MD      Chief Complaint   Patient presents with   • Follow-up     History of Present Illness  Marcell De Santiago is a 64 y.o. male  with history of hypertension, hyperlipidemia, intraventricular contraction, and atrial fibrillation.He reports having a stress test over 10 years ago which was normal.  He was admitted in 2019.  He was having fever, chills and body aches at home.  He then noticed sudden left leg swelling.  He was diagnosed with left leg cellulitis and sepsis.  He required IV fluid and IV antibiotic.  During hospitalization he had rapid atrial fibrillation.  He was asymptomatic with that.  Echocardiogram showed normal left ventricular systolic function with an EF of 54% and diastolic function.  There was thickening of the right coronary cusp of the aortic valve.  Transesophageal echocardiogram showed ejection fraction approximately 40-45% with no thrombus.  There was mild mitral regurgitation and negative saline test results.  He was cardioverted to normal sinus rhythm and was placed on metoprolol.  Lisinopril was held to favor metoprolol.  He was started on Eliquis for anticoagulation.  He now comes in for follow-up.  He has been doing great.  He is back exercising.  He has had no chest pain or pressure no shortness of breath orthopnea or PND.  No palpitations near syncope or syncope.  His heart rate had been a little slow and so PCP cut his metoprolol back to 50 mg twice a day they brought in a number of blood pressure recordings and his blood pressure if anything is been a little on the low side at that dose.        Past Medical History:   Diagnosis Date   • A-fib (CMS/HCC)    • Atrial fibrillation (CMS/HCC)    • Cellulitis    • Chronic lumbar pain    • Dyslipidemia  6/5/2018   • Essential hypertension 4/11/2016   • History of low potassium          Past Surgical History:   Procedure Laterality Date   • COLONOSCOPY  2010   • COLONOSCOPY W/ BIOPSIES  07/18/2013    Dr. Farfan; hyperplastic polyp   • HYDROCELE EXCISION / REPAIR     • INGUINAL HERNIA REPAIR Bilateral 10/02/2007    Dr Hinton   • INGUINAL HERNIA REPAIR  2007    Did a long time ago   • KNEE ARTHROSCOPY Right 1986   • PILONIDAL CYST / SINUS EXCISION  1981   • VASECTOMY  1996         Current Outpatient Medications on File Prior to Visit   Medication Sig Dispense Refill   • apixaban (ELIQUIS) 5 MG tablet tablet Take 1 tablet by mouth Every 12 (Twelve) Hours. 60 tablet 5   • cephalexin (KEFLEX) 500 MG capsule Take 1 capsule by mouth 4 (Four) Times a Day. 28 capsule 0   • lisinopril (PRINIVIL,ZESTRIL) 40 MG tablet Take 1 tablet by mouth Daily. 90 tablet 3   • metoprolol tartrate (LOPRESSOR) 50 MG tablet Take 1 tablet by mouth 2 (Two) Times a Day. (Patient taking differently: Take 50 mg by mouth 2 (Two) Times a Day. Takes Half Tablet TwiceDaily) 60 tablet 1   • sulfamethoxazole-trimethoprim (BACTRIM DS,SEPTRA DS) 800-160 MG per tablet Take 1 tablet by mouth 2 (Two) Times a Day. 28 tablet 0     No current facility-administered medications on file prior to visit.          Social History     Socioeconomic History   • Marital status:      Spouse name: Not on file   • Number of children: Not on file   • Years of education: Not on file   • Highest education level: Not on file   Tobacco Use   • Smoking status: Never Smoker   • Smokeless tobacco: Never Used   Substance and Sexual Activity   • Alcohol use: Yes     Types: 1 Shots of liquor per week     Comment: Kareem if bourbon glass once a month   • Drug use: No   • Sexual activity: Not Currently     Partners: Female     Birth control/protection: Surgical     Comment: Vasectomy   Lifestyle   • Physical activity:     Days per week: 7 days     Minutes per session: 30 min   •  Stress: Not on file       Family History   Problem Relation Age of Onset   • Thyroid cancer Mother    • Transient ischemic attack Mother    • Diabetes Mother         Thyroid cance   • Heart attack Father    • Aortic aneurysm Father    • Diabetes type II Sister    • Hypothyroidism Sister    • Diabetes type II Brother    • Diabetes Brother         Type 2   • Multiple sclerosis Brother    • Diabetes Brother         Has MS   • Diabetes type II Brother    • Diabetes Sister         Type 1         Review of Systems   Constitution: Negative for decreased appetite, diaphoresis, fever, malaise/fatigue, weight gain and weight loss.   HENT: Negative for congestion, hearing loss, nosebleeds and tinnitus.    Eyes: Negative for blurred vision, double vision, vision loss in left eye, vision loss in right eye and visual disturbance.   Cardiovascular:        As noted in HPI   Respiratory:        As noted HPI   Endocrine: Negative for cold intolerance and heat intolerance.   Hematologic/Lymphatic: Negative for bleeding problem. Does not bruise/bleed easily.   Skin: Negative for color change, flushing, itching and rash.   Musculoskeletal: Negative for arthritis, back pain, joint pain, joint swelling, muscle weakness and myalgias.   Gastrointestinal: Negative for bloating, abdominal pain, constipation, diarrhea, dysphagia, heartburn, hematemesis, hematochezia, melena, nausea and vomiting.   Genitourinary: Negative for bladder incontinence, dysuria, frequency, nocturia and urgency.   Neurological: Negative for dizziness, focal weakness, headaches, light-headedness, loss of balance, numbness, paresthesias, vertigo and weakness.   Psychiatric/Behavioral: Negative for depression, memory loss and substance abuse.       Procedures      ECG 12 Lead  Date/Time: 1/8/2020 3:42 PM  Performed by: Florencio Pradhan MD  Authorized by: lForencio Pradhan MD   Comparison: compared with previous ECG   Similar to previous ECG  Rhythm: sinus  "bradycardia  Rate: normal  QRS axis: normal                  Objective:    /70 (BP Location: Right arm)   Pulse (!) 44   Ht 188 cm (74\")   Wt 108 kg (239 lb)   BMI 30.69 kg/m²        Physical Exam  Physical Exam   Constitutional: He is oriented to person, place, and time. He appears well-developed and well-nourished. No distress.   HENT:   Head: Normocephalic.   Eyes: Pupils are equal, round, and reactive to light. Conjunctivae are normal. No scleral icterus.   Neck: Normal carotid pulses, no hepatojugular reflux and no JVD present. Carotid bruit is not present. No tracheal deviation, no edema and no erythema present. No thyromegaly present.   Cardiovascular: Normal rate, regular rhythm, S1 normal, S2 normal, normal heart sounds and intact distal pulses.  No extrasystoles are present. PMI is not displaced. Exam reveals no gallop, no distant heart sounds and no friction rub.   No murmur heard.  Pulses:       Carotid pulses are 2+ on the right side, and 2+ on the left side.       Radial pulses are 2+ on the right side, and 2+ on the left side.        Femoral pulses are 2+ on the right side, and 2+ on the left side.       Dorsalis pedis pulses are 2+ on the right side, and 2+ on the left side.        Posterior tibial pulses are 2+ on the right side, and 2+ on the left side.   Pulmonary/Chest: Effort normal and breath sounds normal. No respiratory distress. He has no decreased breath sounds. He has no wheezes. He has no rhonchi. He has no rales. He exhibits no tenderness.   Abdominal: Soft. Bowel sounds are normal. He exhibits no distension and no mass. There is no hepatosplenomegaly. There is no tenderness. There is no rebound and no guarding.   Musculoskeletal: He exhibits no edema, tenderness or deformity.   Neurological: He is alert and oriented to person, place, and time.   Skin: Skin is warm and dry. No rash noted. He is not diaphoretic. No cyanosis or erythema. No pallor. Nails show no clubbing. "   Psychiatric: He has a normal mood and affect. His speech is normal and behavior is normal. Judgment and thought content normal.           Assessment:   1.  64-year-old gentleman with paroxysmal atrial fibrillation status post cardioversion.  He was asymptomatic while he was in atrial fibrillation. The patient's CHADS2-VASc score is 1.  He seems to be remaining in sinus rhythm.  His heart rate is a little slow we are going to decrease the metoprolol to 25 mg twice a day he is to return for a mobile outpatient telemetry for 2 weeks if he remains in sinus rhythm we will take him off Eliquis put him on 81 mg coated aspirin daily he will see our nurse practitioner in 3 months we will see me in a year and call if there is problems.  2.  Hypertension blood pressure under great control as above.  3.  Mild mitral regurgitation on transesophageal echo 11/26/2019  4.  Cellulitis left lower extremity resolved.  5.  Premature ventricular contraction.  Asymptomatic normal LV function.  6.  History of snoring and witnessed apnea he is agreeable to have sleep study.  He is scheduled for a sleep study.         Plan:

## 2020-01-14 ENCOUNTER — TELEPHONE (OUTPATIENT)
Dept: FAMILY MEDICINE CLINIC | Facility: CLINIC | Age: 65
End: 2020-01-14

## 2020-01-14 RX ORDER — SULFAMETHOXAZOLE AND TRIMETHOPRIM 800; 160 MG/1; MG/1
1 TABLET ORAL 2 TIMES DAILY
Qty: 28 TABLET | Refills: 0 | Status: SHIPPED | OUTPATIENT
Start: 2020-01-14 | End: 2020-01-30

## 2020-01-14 RX ORDER — CEPHALEXIN 500 MG/1
500 CAPSULE ORAL 4 TIMES DAILY
Qty: 28 CAPSULE | Refills: 0 | Status: SHIPPED | OUTPATIENT
Start: 2020-01-14 | End: 2020-01-30

## 2020-01-14 NOTE — TELEPHONE ENCOUNTER
DOES HE NEED TO STAY ON ANTIBIOTICS FOR CELLULITIS? HE ONLY HAS 1 DAY LEFT  HE IS IMPROVING BUT STILL HAS SOME REDNESS

## 2020-01-15 ENCOUNTER — TELEPHONE (OUTPATIENT)
Dept: FAMILY MEDICINE CLINIC | Facility: CLINIC | Age: 65
End: 2020-01-15

## 2020-01-16 NOTE — TELEPHONE ENCOUNTER
Patient informed no f/u on appt date if problems er or call sooner. Fyi cardio. Changed medication

## 2020-01-30 ENCOUNTER — OFFICE VISIT (OUTPATIENT)
Dept: FAMILY MEDICINE CLINIC | Facility: CLINIC | Age: 65
End: 2020-01-30

## 2020-01-30 VITALS
TEMPERATURE: 98.1 F | HEART RATE: 56 BPM | SYSTOLIC BLOOD PRESSURE: 128 MMHG | HEIGHT: 74 IN | WEIGHT: 241.4 LBS | BODY MASS INDEX: 30.98 KG/M2 | DIASTOLIC BLOOD PRESSURE: 72 MMHG | OXYGEN SATURATION: 97 %

## 2020-01-30 DIAGNOSIS — I48.0 PAROXYSMAL ATRIAL FIBRILLATION (HCC): ICD-10-CM

## 2020-01-30 DIAGNOSIS — I10 ESSENTIAL HYPERTENSION: ICD-10-CM

## 2020-01-30 DIAGNOSIS — E78.5 DYSLIPIDEMIA: ICD-10-CM

## 2020-01-30 DIAGNOSIS — Z12.5 PROSTATE CANCER SCREENING: ICD-10-CM

## 2020-01-30 DIAGNOSIS — L03.116 CELLULITIS OF LEFT LOWER EXTREMITY: Primary | ICD-10-CM

## 2020-01-30 LAB
25(OH)D3 SERPL-MCNC: 24.9 NG/ML (ref 30–100)
ALBUMIN SERPL-MCNC: 4.3 G/DL (ref 3.5–5.2)
ALBUMIN/GLOB SERPL: 2 G/DL
ALP SERPL-CCNC: 58 U/L (ref 39–117)
ALT SERPL W P-5'-P-CCNC: 30 U/L (ref 1–41)
ANION GAP SERPL CALCULATED.3IONS-SCNC: 12.8 MMOL/L (ref 5–15)
AST SERPL-CCNC: 25 U/L (ref 1–40)
BILIRUB SERPL-MCNC: 0.5 MG/DL (ref 0.2–1.2)
BUN BLD-MCNC: 13 MG/DL (ref 8–23)
BUN/CREAT SERPL: 16.3 (ref 7–25)
CALCIUM SPEC-SCNC: 8.6 MG/DL (ref 8.6–10.5)
CHLORIDE SERPL-SCNC: 103 MMOL/L (ref 98–107)
CHOLEST SERPL-MCNC: 150 MG/DL (ref 0–200)
CO2 SERPL-SCNC: 25.2 MMOL/L (ref 22–29)
CREAT BLD-MCNC: 0.8 MG/DL (ref 0.76–1.27)
DEPRECATED RDW RBC AUTO: 43 FL (ref 37–54)
ERYTHROCYTE [DISTWIDTH] IN BLOOD BY AUTOMATED COUNT: 13.5 % (ref 12.3–15.4)
GFR SERPL CREATININE-BSD FRML MDRD: 98 ML/MIN/1.73
GLOBULIN UR ELPH-MCNC: 2.1 GM/DL
GLUCOSE BLD-MCNC: 96 MG/DL (ref 65–99)
HCT VFR BLD AUTO: 44.6 % (ref 37.5–51)
HDLC SERPL-MCNC: 29 MG/DL (ref 40–60)
HGB BLD-MCNC: 15 G/DL (ref 13–17.7)
LDLC SERPL CALC-MCNC: 90 MG/DL (ref 0–100)
LDLC/HDLC SERPL: 3.12 {RATIO}
MCH RBC QN AUTO: 29.4 PG (ref 26.6–33)
MCHC RBC AUTO-ENTMCNC: 33.6 G/DL (ref 31.5–35.7)
MCV RBC AUTO: 87.3 FL (ref 79–97)
PLATELET # BLD AUTO: 250 10*3/MM3 (ref 140–450)
PMV BLD AUTO: 10.9 FL (ref 6–12)
POTASSIUM BLD-SCNC: 4.4 MMOL/L (ref 3.5–5.2)
PROT SERPL-MCNC: 6.4 G/DL (ref 6–8.5)
PSA SERPL-MCNC: 1.42 NG/ML (ref 0–4)
RBC # BLD AUTO: 5.11 10*6/MM3 (ref 4.14–5.8)
SODIUM BLD-SCNC: 141 MMOL/L (ref 136–145)
TRIGL SERPL-MCNC: 153 MG/DL (ref 0–150)
VLDLC SERPL-MCNC: 30.6 MG/DL (ref 5–40)
WBC NRBC COR # BLD: 8.98 10*3/MM3 (ref 3.4–10.8)

## 2020-01-30 PROCEDURE — 99214 OFFICE O/P EST MOD 30 MIN: CPT | Performed by: INTERNAL MEDICINE

## 2020-01-30 PROCEDURE — 85027 COMPLETE CBC AUTOMATED: CPT | Performed by: INTERNAL MEDICINE

## 2020-01-30 PROCEDURE — G0103 PSA SCREENING: HCPCS | Performed by: INTERNAL MEDICINE

## 2020-01-30 PROCEDURE — 80053 COMPREHEN METABOLIC PANEL: CPT | Performed by: INTERNAL MEDICINE

## 2020-01-30 PROCEDURE — 36415 COLL VENOUS BLD VENIPUNCTURE: CPT | Performed by: INTERNAL MEDICINE

## 2020-01-30 PROCEDURE — 80061 LIPID PANEL: CPT | Performed by: INTERNAL MEDICINE

## 2020-01-30 PROCEDURE — 82306 VITAMIN D 25 HYDROXY: CPT | Performed by: INTERNAL MEDICINE

## 2020-01-30 NOTE — PROGRESS NOTES
Subjective   Marcell De Santiago is a 64 y.o. male.     History of Present Illness   Patient was seen for cellulitis of the left leg.  Patient has been taking Cipro with cephalexin for several weeks.  Patient did have some erythema but he did scratch it and created an abrasion.  Patient had MRSA of the leg and was put on Bactroban cream.  Patient will follow-up in 2 weeks.  Patient's blood pressures been running 128/72.  Lipids have been treated with diet and exercise.  Labs were drawn today we will follow-up in 2 weeks.  Patient does have atrial fibrillation regulated by Dr. collins.  Patient's atrial fibrillation was caused by his MRSA.  Cardiologist is planning to stop Eliquis in another month.  Patient is in normal sinus rhythm at present time.    Disclaimer  The following portions of the patient's history were reviewed and updated as appropriate: allergies, current medications, past family history, past medical history, past social history, past surgical history and problem list.    Review of Systems   Constitutional: Negative for fatigue and fever.   HENT: Positive for congestion. Negative for trouble swallowing.    Eyes: Negative for discharge and visual disturbance.   Respiratory: Negative for choking and shortness of breath.    Cardiovascular: Negative for chest pain and palpitations.   Gastrointestinal: Negative for abdominal pain and blood in stool.   Endocrine: Negative.    Genitourinary: Negative for genital sores and hematuria.   Musculoskeletal: Negative for gait problem and joint swelling.   Skin: Positive for wound. Negative for color change, pallor and rash.        Cellulitis left leg   Allergic/Immunologic: Positive for environmental allergies. Negative for immunocompromised state.   Neurological: Negative for facial asymmetry and speech difficulty.   Psychiatric/Behavioral: Negative for hallucinations and suicidal ideas.       Objective   Physical Exam   Constitutional: He is oriented to person,  place, and time. He appears well-developed and well-nourished.   HENT:   Head: Normocephalic.   Eyes: Pupils are equal, round, and reactive to light. Conjunctivae are normal.   Neck: Normal range of motion. Neck supple.   Cardiovascular: Normal rate, regular rhythm and normal heart sounds.   Pulmonary/Chest: Effort normal and breath sounds normal.   Abdominal: Soft. Bowel sounds are normal.   Musculoskeletal: Normal range of motion.   Neurological: He is alert and oriented to person, place, and time.   Skin: Skin is warm and dry. There is erythema.   Redness with abrasion of the left leg   Psychiatric: He has a normal mood and affect. His behavior is normal. Judgment and thought content normal.   Nursing note and vitals reviewed.      Assessment/Plan #1 labs #2 continue cardiology consult #3 continue to monitor blood pressure #4 return to clinic 2 weeks for cellulitis  Marcell was seen today for f/u lt leg cellulitis.    Diagnoses and all orders for this visit:    Cellulitis of left lower extremity  -     CBC (No Diff)  -     Comprehensive Metabolic Panel  -     Lipid Panel  -     Vitamin D 25 Hydroxy    Paroxysmal atrial fibrillation (CMS/HCC)  -     CBC (No Diff)  -     Comprehensive Metabolic Panel  -     Lipid Panel  -     Vitamin D 25 Hydroxy    Essential hypertension  -     CBC (No Diff)  -     Comprehensive Metabolic Panel  -     Lipid Panel  -     Vitamin D 25 Hydroxy    Dyslipidemia  -     CBC (No Diff)  -     Comprehensive Metabolic Panel  -     Lipid Panel  -     Vitamin D 25 Hydroxy    Prostate cancer screening  -     PSA Screen    Other orders  -     mupirocin (BACTROBAN) 2 % ointment; Apply  topically to the appropriate area as directed 2 (Two) Times a Day.

## 2020-02-10 ENCOUNTER — TELEPHONE (OUTPATIENT)
Dept: CARDIOLOGY | Facility: CLINIC | Age: 65
End: 2020-02-10

## 2020-02-10 NOTE — TELEPHONE ENCOUNTER
Reading Physician Reading Date Result Priority   Florencio Pradhan MD 2/10/2020 Routine      Result Text     · Patient was monitored for 18 days and 22 hours. Basic rhythm is sinus.  · Occasional premature supraventricular beats accounting for 2% of the recording nonsustained SVT longest 8 beats.  · Occasional premature ventricular beats accounting for 4.7% of the recording. Rare couplets no nonsustained episodes.  · No pauses or bradycardia noted.

## 2020-02-11 ENCOUNTER — TELEPHONE (OUTPATIENT)
Dept: FAMILY MEDICINE CLINIC | Facility: CLINIC | Age: 65
End: 2020-02-11

## 2020-02-11 RX ORDER — LISINOPRIL 40 MG/1
40 TABLET ORAL DAILY
Qty: 90 TABLET | Refills: 3 | Status: SHIPPED | OUTPATIENT
Start: 2020-02-11 | End: 2020-05-20 | Stop reason: SDUPTHER

## 2020-02-11 RX ORDER — HYDROCHLOROTHIAZIDE 12.5 MG/1
12.5 TABLET ORAL DAILY
Qty: 30 TABLET | Refills: 3 | Status: SHIPPED | OUTPATIENT
Start: 2020-02-11 | End: 2020-05-20 | Stop reason: SDUPTHER

## 2020-02-11 NOTE — TELEPHONE ENCOUNTER
Patient wanted to know because his ankles are swelling a little should he do a  water pill 12.5 again with the 40 lisinopril you gave him  Or go back to 20/12.5 bid he has some of old ones still-but hospital took him off due to dehydration.?

## 2020-02-11 NOTE — TELEPHONE ENCOUNTER
PT SAYS DR. SAL TOOK HIM OFF OF HIS ELIQUIS AND PUT HIM ON 81mg BABY ASPIRIN, WANTED TO LET DR. CABRERA KNOW THIS.    ALSO SAYS HIS B/P HAS BEEN RISING ABOVE 140 RECENTLY, RUNNING BETWEEN 140-150.   PT MENTIONED ASKING IF HE SHOULD POSSIBLY SWITCH BACK TO LISINOPRIL?

## 2020-02-12 NOTE — TELEPHONE ENCOUNTER
Informed pt of what Dr. Wall yesterday evening, he has a couple more questions. He has a dentist appointment next week and wants to make sure being on the aspirin is ok or should he stop it before?  He also has used all of the Bactroban ointment along with almost all of the refill. Can more be sent in?

## 2020-02-12 NOTE — TELEPHONE ENCOUNTER
Yes let me know when he needs more.  The aspirin should be stopped 1 week before visit if dentist wants him to stop it.  Need to call dental office to see if he needs to stop

## 2020-02-13 ENCOUNTER — TELEPHONE (OUTPATIENT)
Dept: CARDIOLOGY | Facility: CLINIC | Age: 65
End: 2020-02-13

## 2020-02-13 NOTE — TELEPHONE ENCOUNTER
Patient called to inquire rather he would need to hold ASA prior to dental cleaning.  He had to hold Eliquis previously but was not sure since Eliquis has been stopped and he is only on ASA now.  History of Afib.   Please advise.     Patient's phone number is [phone] / ANT

## 2020-02-13 NOTE — TELEPHONE ENCOUNTER
Patient notified of Dr. Pradhan's instructions with regards to the monitor results.  He was also notified, there is no need to hold ASA prior to dental cleaning.  Patient verbalized understanding of all instructions given./ ANT

## 2020-02-19 ENCOUNTER — APPOINTMENT (OUTPATIENT)
Dept: SLEEP MEDICINE | Facility: HOSPITAL | Age: 65
End: 2020-02-19

## 2020-03-04 ENCOUNTER — TELEPHONE (OUTPATIENT)
Dept: FAMILY MEDICINE CLINIC | Facility: CLINIC | Age: 65
End: 2020-03-04

## 2020-03-04 NOTE — TELEPHONE ENCOUNTER
PATIENT CALLED STATING THAT DR. CABRERA HAS BEEN TREATING HIM FOR HIGH BLOOD PRESSURE, AND HAS ADVISED HIM TO MONITOR HIS BLOOD PRESSURE AND REPORT HIS READINGS BACK TO HIM.    THE PATIENT STATED THAT OVER THE PAST COUPLE OF WEEKS, HE HAS MEASURED HIS BLOOD PRESSURE AROUND 1/2 HOUR TO AN HOUR AFTER HE WAKES UP, AND IT HAS BEEN BETWEEN 140-150/80's. THE PATIENT STATED THAT HE HAS HAD NO HEADACHES, BUT HE DOES HAVE A LITTLE BIT OF SWELLING IN HIS LEFT FOOT. THE PATIENT STATED THAT HE HAS BEEN TAKING HIS MEDICATIONS AS DIRECTED.    THE PATIENT STATED THAT HE DOES NOT KNOW IF DR. CABRERA WANTS TO MAKE ANY CHANGES TO HIS MEDICATIONS, BUT HE WANTED TO INFORM HIM OF WHAT WAS GOING ON TO SEE ANY FURTHER ACTION THAT NEEDED TO BE TAKEN.    PLEASE CALL THE PATIENT [phone] WHEN THIS MESSAGE HAS BEEN RECEIVED AND ADVISE.    IF NEEDED, I CONFIRMED THE CORRECT PHARMACY WITH THE PATIENT TO BE [facility] ON [facility].    IF THERE ARE ANY QUESTIONS OR CONCERNS PLEASE CALL THE PATIENT [phone] OR THE PHARMACY AT [facility] -[phone].

## 2020-03-09 ENCOUNTER — APPOINTMENT (OUTPATIENT)
Dept: SLEEP MEDICINE | Facility: HOSPITAL | Age: 65
End: 2020-03-09

## 2020-03-17 ENCOUNTER — TELEPHONE (OUTPATIENT)
Dept: FAMILY MEDICINE CLINIC | Facility: CLINIC | Age: 65
End: 2020-03-17

## 2020-03-23 ENCOUNTER — TELEPHONE (OUTPATIENT)
Dept: CARDIOLOGY | Facility: CLINIC | Age: 65
End: 2020-03-23

## 2020-04-03 ENCOUNTER — TELEPHONE (OUTPATIENT)
Dept: FAMILY MEDICINE CLINIC | Facility: CLINIC | Age: 65
End: 2020-04-03

## 2020-04-08 ENCOUNTER — TELEMEDICINE (OUTPATIENT)
Dept: CARDIOLOGY | Facility: CLINIC | Age: 65
End: 2020-04-08

## 2020-04-08 VITALS
WEIGHT: 243 LBS | BODY MASS INDEX: 31.18 KG/M2 | DIASTOLIC BLOOD PRESSURE: 80 MMHG | HEIGHT: 74 IN | HEART RATE: 50 BPM | SYSTOLIC BLOOD PRESSURE: 132 MMHG

## 2020-04-08 DIAGNOSIS — R29.818 SUSPECTED SLEEP APNEA: ICD-10-CM

## 2020-04-08 DIAGNOSIS — I49.3 PVC (PREMATURE VENTRICULAR CONTRACTION): ICD-10-CM

## 2020-04-08 DIAGNOSIS — I48.0 PAROXYSMAL ATRIAL FIBRILLATION (HCC): Primary | ICD-10-CM

## 2020-04-08 PROCEDURE — 99214 OFFICE O/P EST MOD 30 MIN: CPT | Performed by: NURSE PRACTITIONER

## 2020-04-08 NOTE — PROGRESS NOTES
Date of Office Visit: 20  Encounter Provider: STEPHANY Trinh  Place of Service: [facility] CARDIOLOGY  Patient Name: Marcell De Santiago  :1955    Chief Complaint   Patient presents with   • Atrial Fibrillation   • Follow-up     VIDEO VISIT   :     HPI: Marcell De Santiago is a 64 y.o. male  with history of hypertension, hyperlipidemia, intraventricular contraction, and atrial fibrillation.  He is followed by Dr. Pradhan. I will visit with him in follow up today.    He reports having a stress test over 10 years ago which was normal.  He was admitted in 2019.  He was having fever, chills and body aches at home.  He then noticed sudden left leg swelling.  He was diagnosed with left leg cellulitis and sepsis.  He required IV fluid and IV antibiotic.  During hospitalization he had rapid atrial fibrillation.  He was asymptomatic with that.  Echocardiogram showed normal left ventricular systolic function with an EF of 54% and diastolic function.  There was thickening of the right coronary cusp of the aortic valve.  Transesophageal echocardiogram showed ejection fraction approximately 40-45% with no thrombus.  There was mild mitral regurgitation and negative saline test results.  He was cardioverted to normal sinus rhythm and was placed on metoprolol.  Lisinopril was held to favor metoprolol.  He was started on Eliquis for anticoagulation.  He was discharged on Keflex for 10-day course. In follow up with his PCP, his metoprolol was cut in half and he was restarted on Prinzide once daily. He was prescribed Bactrim as well.  He reported history of snoring and wife confirmed witnessed apnea and was referred to sleep medicine. Follow up 18 day mobile telemetry device showed sinus rhythm occasional supraventricular beats, premature ventricular beats accounting for 4.7 % of the recording. Since there was no a fib, eliquis was stopped and he was to start aspirin 81 mg daily.     We  "visit today via video chat. He has not had chest pain, shortness of breath, lightheadedness, near syncope, or syncope. He continues to bike at least 3 days a week. He walks nightly with the dog for 30 minutes. He is taking aspirin only and he is not having bleeding issues.      No Known Allergies    Past Medical History:   Diagnosis Date   • A-fib (CMS/HCC)    • Atrial fibrillation (CMS/HCC)    • Cellulitis    • Chronic lumbar pain    • Dyslipidemia 6/5/2018   • Essential hypertension 4/11/2016   • History of low potassium        Past Surgical History:   Procedure Laterality Date   • COLONOSCOPY  2010   • COLONOSCOPY W/ BIOPSIES  07/18/2013    Dr. Farfan; hyperplastic polyp   • HYDROCELE EXCISION / REPAIR     • INGUINAL HERNIA REPAIR Bilateral 10/02/2007    Dr Hinton   • INGUINAL HERNIA REPAIR  2007    Did a long time ago   • KNEE ARTHROSCOPY Right 1986   • PILONIDAL CYST / SINUS EXCISION  1981   • VASECTOMY  1996         Family and social history reviewed.     Review of Systems   Cardiovascular: Negative for chest pain, dyspnea on exertion, leg swelling and palpitations.   Hematologic/Lymphatic: Negative for bleeding problem.     All other systems were reviewed and are negative        Objective:     Vitals:    04/08/20 1350   BP: 132/80   BP Location: Left arm   Patient Position: Sitting   Pulse: 50   Weight: 110 kg (243 lb)   Height: 188 cm (74\")     Body mass index is 31.2 kg/m².    PHYSICAL EXAM:  Patient is well developed, A & O x4, memory intact respirations normal rate, non labored, PERRLA, skin color pink    Procedures  Unable to assess    Current Outpatient Medications   Medication Sig Dispense Refill   • aspirin 81 MG tablet Take 1 tablet by mouth Daily. 30 tablet 3   • hydroCHLOROthiazide (HYDRODIURIL) 12.5 MG tablet Take 1 tablet by mouth Daily. PRN swelling 30 tablet 3   • lisinopril (PRINIVIL,ZESTRIL) 40 MG tablet Take 1 tablet by mouth Daily. 90 tablet 3   • metoprolol tartrate (LOPRESSOR) 25 MG " tablet Take 1 tablet by mouth 2 (Two) Times a Day. 180 tablet 2   • mupirocin (BACTROBAN) 2 % ointment APPLY TO AFFECTED AREA(S)TOPICALLY  TWO TIMES A DAY AS DIRECTED 22 each 0     No current facility-administered medications for this visit.      Assessment:       Diagnosis Plan   1. Paroxysmal atrial fibrillation (CMS/HCC)     2. PVC (premature ventricular contraction)     3. Suspected sleep apnea          No orders of the defined types were placed in this encounter.    Plan:       1. 64-year-old gentleman with paroxysmal atrial fibrillation in the setting of infection/cellulitis. He had a cardioversion. He was asymptomatic while he was in atrial fibrillation.  Follow up 18 day mobile telemetry device showed no recurrence, Eliquis was stopped. CHADS2-VASc score is 1. Maintained on aspirin and beta blockade.   2.  Hypertension blood pressure appears adequate he follows this at home  3.  Mild mitral regurgitation on transesophageal echo 11/26/2019  4.   Premature ventricular contraction  5.  History of snoring and witnessed apnea- he is scheduled for sleep study in May he is to keep that appointment.    This patient has consented to a telehealth visit via video . The visit was scheduled as a video visit to comply with patient safety concerns in accordance with CDC recommendations.  All vitals recorded within this visit are reported by the patient.  I spent  25 minutes in total including but not limited to the 8 minutes spent in direct conversation with this patient.      He is to call with questions or concerns. Otherwise, he will keep his appointment in January 2021.    It has been a pleasure to participate in this patient's care.      Thank you,  STEPHANY Trinh      **I used Dragon to dictate this note:**

## 2020-05-20 ENCOUNTER — OFFICE VISIT (OUTPATIENT)
Dept: FAMILY MEDICINE CLINIC | Facility: CLINIC | Age: 65
End: 2020-05-20

## 2020-05-20 VITALS
RESPIRATION RATE: 20 BRPM | HEIGHT: 74 IN | BODY MASS INDEX: 31.32 KG/M2 | WEIGHT: 244 LBS | OXYGEN SATURATION: 100 % | TEMPERATURE: 97 F | HEART RATE: 44 BPM | SYSTOLIC BLOOD PRESSURE: 144 MMHG | DIASTOLIC BLOOD PRESSURE: 76 MMHG

## 2020-05-20 DIAGNOSIS — E78.5 DYSLIPIDEMIA: ICD-10-CM

## 2020-05-20 DIAGNOSIS — Z12.5 PROSTATE CANCER SCREENING: ICD-10-CM

## 2020-05-20 DIAGNOSIS — I10 ESSENTIAL HYPERTENSION: Primary | ICD-10-CM

## 2020-05-20 DIAGNOSIS — I48.0 PAROXYSMAL ATRIAL FIBRILLATION (HCC): ICD-10-CM

## 2020-05-20 PROCEDURE — 99214 OFFICE O/P EST MOD 30 MIN: CPT | Performed by: INTERNAL MEDICINE

## 2020-05-20 RX ORDER — LISINOPRIL 40 MG/1
40 TABLET ORAL DAILY
Qty: 90 TABLET | Refills: 3 | Status: SHIPPED | OUTPATIENT
Start: 2020-05-20 | End: 2020-12-04 | Stop reason: SDUPTHER

## 2020-05-20 RX ORDER — HYDROCHLOROTHIAZIDE 12.5 MG/1
12.5 TABLET ORAL DAILY
Qty: 90 TABLET | Refills: 1 | Status: SHIPPED | OUTPATIENT
Start: 2020-05-20 | End: 2020-09-11

## 2020-05-20 NOTE — PROGRESS NOTES
Subjective   Marcell De Santiago is a 64 y.o. male.     History of Present Illness   Patient was seen for hypertension.  Blood pressures been running 150- 140s over 80s.  Patient was advised to do low impact exercises 30 minutes 3-5 times a week.  Also advised to do a DASH diet.  He will monitor blood pressures and return to clinic in 1 month.  Patient did have a history of paroxysmal atrial fibrillation but cardiologist is taking off his blood thinners after getting the results of his Holter monitor.  Patient remains on his metoprolol for rate control.  Patient lipids are treated with diet and exercise.  Triglycerides 153, HDL 29, LDL 90.  Patient is also had extensive treatment for cellulitis of left leg.  It is greatly improved but is still erythematous and has scaly patches on it.  Patient was given the name of a dermatologist and recommended patient to be seen.  Patient did have labs drawn today and will follow-up in 4 days.    Dictated utilizing Dragon dictation. If there are questions or for further clarification, please contact me.  The following portions of the patient's history were reviewed and updated as appropriate: allergies, current medications, past family history, past medical history, past social history, past surgical history and problem list.    Review of Systems   Constitutional: Negative for fatigue and fever.   HENT: Positive for congestion. Negative for trouble swallowing.    Eyes: Negative for discharge and visual disturbance.   Respiratory: Negative for choking and shortness of breath.    Cardiovascular: Negative for chest pain and palpitations.   Gastrointestinal: Negative for abdominal pain and blood in stool.   Endocrine: Negative.    Genitourinary: Negative for genital sores and hematuria.   Musculoskeletal: Negative for gait problem and joint swelling.   Skin: Positive for rash. Negative for color change, pallor and wound.   Allergic/Immunologic: Positive for environmental allergies.  Negative for immunocompromised state.   Neurological: Negative for facial asymmetry and speech difficulty.   Psychiatric/Behavioral: Negative for hallucinations and suicidal ideas.       Objective   Physical Exam   Constitutional: He is oriented to person, place, and time. He appears well-developed and well-nourished.   HENT:   Head: Normocephalic.   Eyes: Pupils are equal, round, and reactive to light. Conjunctivae are normal.   Neck: Normal range of motion. Neck supple.   Cardiovascular: Normal rate, regular rhythm and normal heart sounds. Exam reveals no gallop and no friction rub.   No murmur heard.  Pulmonary/Chest: Effort normal and breath sounds normal. No stridor. No respiratory distress. He has no wheezes. He has no rales. He exhibits no tenderness.   Abdominal: Soft. Bowel sounds are normal.   Musculoskeletal: Normal range of motion.   Neurological: He is alert and oriented to person, place, and time.   Skin: Skin is warm and dry. Rash noted.   Psychiatric: He has a normal mood and affect. His behavior is normal. Judgment and thought content normal.   Nursing note and vitals reviewed.      Assessment/Plan #1 blood pressure monitoring at home #2 recommend referral to dermatologist #3 low impact exercise 30 minutes 3-5 times a week.  #4 DASH diet #5 labs  Marcell was seen today for hypertension and followup cellulitis.    Diagnoses and all orders for this visit:    Essential hypertension  -     Cancel: CBC (No Diff); Future  -     Cancel: Comprehensive Metabolic Panel  -     Lipid Panel  -     Cancel: Vitamin D 25 Hydroxy  -     CBC (No Diff); Future  -     Comprehensive Metabolic Panel; Future  -     Lipid Panel; Future  -     PSA Screen; Future  -     Vitamin D 25 Hydroxy; Future    Dyslipidemia  -     Cancel: CBC (No Diff); Future  -     Cancel: Comprehensive Metabolic Panel  -     Lipid Panel  -     Cancel: Vitamin D 25 Hydroxy  -     CBC (No Diff); Future  -     Comprehensive Metabolic Panel; Future  -      Lipid Panel; Future  -     PSA Screen; Future  -     Vitamin D 25 Hydroxy; Future    Paroxysmal atrial fibrillation (CMS/HCC)  -     Cancel: CBC (No Diff); Future  -     Cancel: Comprehensive Metabolic Panel  -     Lipid Panel  -     Cancel: Vitamin D 25 Hydroxy  -     CBC (No Diff); Future  -     Comprehensive Metabolic Panel; Future  -     Lipid Panel; Future  -     PSA Screen; Future  -     Vitamin D 25 Hydroxy; Future    Prostate cancer screening  -     Cancel: PSA Screen; Future  -     CBC (No Diff); Future  -     Comprehensive Metabolic Panel; Future  -     Lipid Panel; Future  -     PSA Screen; Future  -     Vitamin D 25 Hydroxy; Future    Other orders  -     mupirocin (BACTROBAN) 2 % ointment; Apply  topically to the appropriate area as directed 2 (Two) Times a Day.  -     hydroCHLOROthiazide (HYDRODIURIL) 12.5 MG tablet; Take 1 tablet by mouth Daily. PRN swelling  -     lisinopril (PRINIVIL,ZESTRIL) 40 MG tablet; Take 1 tablet by mouth Daily.  -     metoprolol tartrate (LOPRESSOR) 25 MG tablet; Take 1 tablet by mouth 2 (Two) Times a Day.

## 2020-07-30 ENCOUNTER — OFFICE VISIT (OUTPATIENT)
Dept: FAMILY MEDICINE CLINIC | Facility: CLINIC | Age: 65
End: 2020-07-30

## 2020-07-30 ENCOUNTER — TELEPHONE (OUTPATIENT)
Dept: CARDIOLOGY | Facility: CLINIC | Age: 65
End: 2020-07-30

## 2020-07-30 VITALS
OXYGEN SATURATION: 99 % | HEART RATE: 58 BPM | SYSTOLIC BLOOD PRESSURE: 130 MMHG | HEIGHT: 74 IN | DIASTOLIC BLOOD PRESSURE: 100 MMHG | TEMPERATURE: 97.1 F | WEIGHT: 244.4 LBS | BODY MASS INDEX: 31.37 KG/M2

## 2020-07-30 DIAGNOSIS — E78.5 DYSLIPIDEMIA: ICD-10-CM

## 2020-07-30 DIAGNOSIS — Z12.5 PROSTATE CANCER SCREENING: ICD-10-CM

## 2020-07-30 DIAGNOSIS — I48.0 PAROXYSMAL ATRIAL FIBRILLATION (HCC): ICD-10-CM

## 2020-07-30 DIAGNOSIS — I10 ESSENTIAL HYPERTENSION: Primary | ICD-10-CM

## 2020-07-30 LAB
25(OH)D3 SERPL-MCNC: 43.1 NG/ML (ref 30–100)
ALBUMIN SERPL-MCNC: 4.3 G/DL (ref 3.5–5.2)
ALBUMIN/GLOB SERPL: 2 G/DL
ALP SERPL-CCNC: 53 U/L (ref 39–117)
ALT SERPL W P-5'-P-CCNC: 37 U/L (ref 1–41)
ANION GAP SERPL CALCULATED.3IONS-SCNC: 10.2 MMOL/L (ref 5–15)
AST SERPL-CCNC: 24 U/L (ref 1–40)
BILIRUB SERPL-MCNC: 0.9 MG/DL (ref 0–1.2)
BUN SERPL-MCNC: 17 MG/DL (ref 8–23)
BUN/CREAT SERPL: 15.2 (ref 7–25)
CALCIUM SPEC-SCNC: 9.7 MG/DL (ref 8.6–10.5)
CHLORIDE SERPL-SCNC: 105 MMOL/L (ref 98–107)
CHOLEST SERPL-MCNC: 122 MG/DL (ref 0–200)
CO2 SERPL-SCNC: 25.8 MMOL/L (ref 22–29)
CREAT SERPL-MCNC: 1.12 MG/DL (ref 0.76–1.27)
DEPRECATED RDW RBC AUTO: 40.6 FL (ref 37–54)
ERYTHROCYTE [DISTWIDTH] IN BLOOD BY AUTOMATED COUNT: 12.9 % (ref 12.3–15.4)
GFR SERPL CREATININE-BSD FRML MDRD: 66 ML/MIN/1.73
GLOBULIN UR ELPH-MCNC: 2.2 GM/DL
GLUCOSE SERPL-MCNC: 95 MG/DL (ref 65–99)
HCT VFR BLD AUTO: 49.4 % (ref 37.5–51)
HDLC SERPL-MCNC: 27 MG/DL (ref 40–60)
HGB BLD-MCNC: 16.9 G/DL (ref 13–17.7)
LDLC SERPL CALC-MCNC: 72 MG/DL (ref 0–100)
LDLC/HDLC SERPL: 2.66 {RATIO}
MAGNESIUM SERPL-MCNC: 2.2 MG/DL (ref 1.6–2.4)
MCH RBC QN AUTO: 29.8 PG (ref 26.6–33)
MCHC RBC AUTO-ENTMCNC: 34.2 G/DL (ref 31.5–35.7)
MCV RBC AUTO: 87 FL (ref 79–97)
PLATELET # BLD AUTO: 264 10*3/MM3 (ref 140–450)
PMV BLD AUTO: 11.7 FL (ref 6–12)
POTASSIUM SERPL-SCNC: 4.5 MMOL/L (ref 3.5–5.2)
PROT SERPL-MCNC: 6.5 G/DL (ref 6–8.5)
PSA SERPL-MCNC: 1.09 NG/ML (ref 0–4)
RBC # BLD AUTO: 5.68 10*6/MM3 (ref 4.14–5.8)
SODIUM SERPL-SCNC: 141 MMOL/L (ref 136–145)
T-UPTAKE NFR SERPL: 1.05 TBI (ref 0.8–1.3)
T4 SERPL-MCNC: 7.45 MCG/DL (ref 4.5–11.7)
TRIGL SERPL-MCNC: 116 MG/DL (ref 0–150)
TSH SERPL DL<=0.05 MIU/L-ACNC: 1.51 UIU/ML (ref 0.27–4.2)
VLDLC SERPL-MCNC: 23.2 MG/DL (ref 5–40)
WBC # BLD AUTO: 7.22 10*3/MM3 (ref 3.4–10.8)

## 2020-07-30 PROCEDURE — 83735 ASSAY OF MAGNESIUM: CPT | Performed by: INTERNAL MEDICINE

## 2020-07-30 PROCEDURE — 93000 ELECTROCARDIOGRAM COMPLETE: CPT | Performed by: INTERNAL MEDICINE

## 2020-07-30 PROCEDURE — 84479 ASSAY OF THYROID (T3 OR T4): CPT | Performed by: INTERNAL MEDICINE

## 2020-07-30 PROCEDURE — G0103 PSA SCREENING: HCPCS | Performed by: INTERNAL MEDICINE

## 2020-07-30 PROCEDURE — 84443 ASSAY THYROID STIM HORMONE: CPT | Performed by: INTERNAL MEDICINE

## 2020-07-30 PROCEDURE — 84436 ASSAY OF TOTAL THYROXINE: CPT | Performed by: INTERNAL MEDICINE

## 2020-07-30 PROCEDURE — 80053 COMPREHEN METABOLIC PANEL: CPT | Performed by: INTERNAL MEDICINE

## 2020-07-30 PROCEDURE — 36415 COLL VENOUS BLD VENIPUNCTURE: CPT | Performed by: INTERNAL MEDICINE

## 2020-07-30 PROCEDURE — 85027 COMPLETE CBC AUTOMATED: CPT | Performed by: INTERNAL MEDICINE

## 2020-07-30 PROCEDURE — 82306 VITAMIN D 25 HYDROXY: CPT | Performed by: INTERNAL MEDICINE

## 2020-07-30 PROCEDURE — 99214 OFFICE O/P EST MOD 30 MIN: CPT | Performed by: INTERNAL MEDICINE

## 2020-07-30 PROCEDURE — 80061 LIPID PANEL: CPT | Performed by: INTERNAL MEDICINE

## 2020-07-30 RX ORDER — METOPROLOL TARTRATE 50 MG/1
50 TABLET, FILM COATED ORAL 2 TIMES DAILY
Qty: 60 TABLET | Refills: 3 | Status: SHIPPED | OUTPATIENT
Start: 2020-07-30 | End: 2020-08-06 | Stop reason: DRUGHIGH

## 2020-07-30 NOTE — PROGRESS NOTES
ECG 12 Lead  Date/Time: 7/30/2020 1:12 PM  Performed by: Alex Wall MD  Authorized by: Alex Wall MD   Comparison: not compared with previous ECG   Rhythm: atrial fibrillation  Rate: tachycardic  Conduction: conduction normal  ST Segments: ST segments normal  T Waves: T waves normal  QRS axis: normal  Other: no other findings    Clinical impression: abnormal EKG  Comments: EKG Interpretation Report    Heart rate:    128 beats/min, OR interval:  0 msec, QRS duration:  82 msec  QTu:309 msec, QTc:  385 [id number]

## 2020-07-30 NOTE — TELEPHONE ENCOUNTER
----- Message from Marcell De Santiago sent at 7/30/2020 11:22 AM EDT -----  Regarding: Non-Urgent Medical Question  Contact: [phone]  Doc , saw my primary today Jannie , my 6 month flow up. He put me back in Eliquis because EKG showed possible AFIB  Wants to get halter on me and wanted to have me go to some clinic but I said I would prefer to see u my cardiologist . Is it possible to see you next week?  He was sending copy of ekg to your office  Marcell Anyi [phone]

## 2020-07-30 NOTE — TELEPHONE ENCOUNTER
I received EKG from Dr Wall's office.  Christel Rahman reviewed and advised that patient take Eliquis 5 mg bid, Metoprolol 50 mg bid and stop the ASA.  Patient has an appt to see Dr. Pradhan next Wednesday for New Onset Afib.  Discussed with patient and he verbalized understanding of all instructions given.  Medication list updated. / ANT

## 2020-07-30 NOTE — PROGRESS NOTES
Subjective  Procedures   Marcell De Santiago is a 64 y.o. male.     History of Present Illness   Patient was seen for hypertension.  Patient's blood pressures running 130s over 80s.  Patient on auscultation appear to be in atrial fibrillation.  Patient's EKG did show atrial fibrillation with a rate of 116 beats a minute.  Patient was advised to go to the chest pain clinic but he did not not want to.  Patient was placed on Eliquis starter pack and increase his metoprolol to 50 mg twice daily.  Patient was scheduled for a 2D echo and Holter monitor.  Patient will record blood pressure and pulse return to clinic next week.  Patient's lipids are being treated with diet exercise.    Dictated utilizing Dragon dictation. If there are questions or for further clarification, please contact me.  The following portions of the patient's history were reviewed and updated as appropriate: allergies, current medications, past family history, past medical history, past social history, past surgical history and problem list.    Review of Systems   Constitutional: Negative for fatigue and fever.   HENT: Positive for congestion. Negative for trouble swallowing.    Eyes: Negative for discharge and visual disturbance.   Respiratory: Negative for choking and shortness of breath.    Cardiovascular: Positive for palpitations. Negative for chest pain.   Gastrointestinal: Negative for abdominal pain and blood in stool.   Endocrine: Negative.    Genitourinary: Negative for genital sores and hematuria.   Musculoskeletal: Negative for gait problem and joint swelling.   Skin: Negative for color change, pallor, rash and wound.   Allergic/Immunologic: Positive for environmental allergies. Negative for immunocompromised state.   Neurological: Negative for facial asymmetry and speech difficulty.   Psychiatric/Behavioral: Negative for hallucinations and suicidal ideas.       Objective   Physical Exam   Constitutional: He is oriented to person, place, and  time. He appears well-developed and well-nourished.   HENT:   Head: Normocephalic and atraumatic.   Eyes: Pupils are equal, round, and reactive to light. Conjunctivae and EOM are normal.   Neck: Normal range of motion. Neck supple.   Cardiovascular: Normal rate and normal heart sounds. Exam reveals no gallop and no friction rub.   No murmur heard.  Pulmonary/Chest: Effort normal and breath sounds normal. No stridor. No respiratory distress. He has no wheezes. He has no rales. He exhibits no tenderness.   Abdominal: Soft. Bowel sounds are normal.   Musculoskeletal: Normal range of motion.   Neurological: He is alert and oriented to person, place, and time.   Skin: Skin is warm and dry.   Psychiatric: He has a normal mood and affect. His behavior is normal. Judgment and thought content normal.   Nursing note and vitals reviewed.      Assessment/Plan #1 monitor blood pressure and pulse #2 Eliquis starter pack then 5 mg twice daily #3 echo with 24-hour Holter #4 follow-up in 1 week  Marcell was seen today for follow-up.    Diagnoses and all orders for this visit:    Essential hypertension  -     Cancel: Basic Metabolic Panel  -     Magnesium  -     Hemoglobin & Hematocrit, Blood; Future  -     CBC (No Diff)  -     Comprehensive Metabolic Panel  -     Cancel: Lipid Panel  -     PSA Screen  -     Vitamin D 25 Hydroxy  -     Cancel: Hemoglobin & Hematocrit, Blood    Dyslipidemia  -     Cancel: Basic Metabolic Panel  -     Magnesium  -     Hemoglobin & Hematocrit, Blood; Future  -     CBC (No Diff)  -     Comprehensive Metabolic Panel  -     Cancel: Lipid Panel  -     PSA Screen  -     Vitamin D 25 Hydroxy  -     Cancel: Hemoglobin & Hematocrit, Blood    Paroxysmal atrial fibrillation (CMS/HCC)  -     ECG 12 Lead  -     Adult Transthoracic Echo Complete W/ Cont if Necessary Per Protocol; Future  -     Holter Monitor - 72 Hour Up To 21 Days; Future  -     Cancel: Basic Metabolic Panel  -     Magnesium  -     Hemoglobin &  Hematocrit, Blood; Future  -     Thyroid Panel With TSH  -     CBC (No Diff)  -     Comprehensive Metabolic Panel  -     Cancel: Lipid Panel  -     PSA Screen  -     Vitamin D 25 Hydroxy  -     Cancel: Hemoglobin & Hematocrit, Blood    Prostate cancer screening  -     CBC (No Diff)  -     Comprehensive Metabolic Panel  -     Cancel: Lipid Panel  -     PSA Screen  -     Vitamin D 25 Hydroxy    Other orders  -     Apixaban (ELIQUIS DVT/PE STARTER PACK) tablet; Take two 5 mg tablets by mouth every 12 hours for 7 days. Followed by one 5 mg tablet every 12 hours. (Dispense starter pack if available)  -     apixaban (ELIQUIS) 5 MG tablet tablet; Take 1 tablet by mouth Every 12 (Twelve) Hours.  -     metoprolol tartrate (LOPRESSOR) 50 MG tablet; Take 1 tablet by mouth 2 (Two) Times a Day.

## 2020-08-05 ENCOUNTER — OFFICE VISIT (OUTPATIENT)
Dept: CARDIOLOGY | Facility: CLINIC | Age: 65
End: 2020-08-05

## 2020-08-05 VITALS
BODY MASS INDEX: 31.06 KG/M2 | WEIGHT: 242 LBS | HEIGHT: 74 IN | HEART RATE: 110 BPM | SYSTOLIC BLOOD PRESSURE: 136 MMHG | DIASTOLIC BLOOD PRESSURE: 70 MMHG

## 2020-08-05 DIAGNOSIS — I48.0 PAROXYSMAL ATRIAL FIBRILLATION (HCC): Primary | ICD-10-CM

## 2020-08-05 DIAGNOSIS — I10 ESSENTIAL HYPERTENSION: ICD-10-CM

## 2020-08-05 DIAGNOSIS — I49.3 PVC (PREMATURE VENTRICULAR CONTRACTION): ICD-10-CM

## 2020-08-05 PROCEDURE — 99214 OFFICE O/P EST MOD 30 MIN: CPT | Performed by: INTERNAL MEDICINE

## 2020-08-05 PROCEDURE — 93000 ELECTROCARDIOGRAM COMPLETE: CPT | Performed by: INTERNAL MEDICINE

## 2020-08-05 NOTE — PROGRESS NOTES
Date of Office Visit: 20  Encounter Provider: Florencio Pradhan MD  Place of Service: [facility] CARDIOLOGY  Patient Name: Marcell De Santiago  :1955  Referral Provider:No ref. provider found      Chief Complaint   Patient presents with   • Atrial Fibrillation   • Follow-up     History of Present Illness  Marcell De Santiago is a 64 y.o. male  with history of hypertension, hyperlipidemia, intraventricular contraction, and atrial fibrillation.He reports having a stress test over 10 years ago which was normal.  He was admitted in 2019.  He was having fever, chills and body aches at home.  He then noticed sudden left leg swelling.  He was diagnosed with left leg cellulitis and sepsis.  He required IV fluid and IV antibiotic.  During hospitalization he had rapid atrial fibrillation.  He was asymptomatic with that.  Echocardiogram showed normal left ventricular systolic function with an EF of 54% and diastolic function.  There was thickening of the right coronary cusp of the aortic valve.  Transesophageal echocardiogram showed ejection fraction approximately 40-45% with no thrombus.  There was mild mitral regurgitation and negative saline test results.  He was cardioverted to normal sinus rhythm and was placed on metoprolol.  Lisinopril was held to favor metoprolol.  He was started on Eliquis for anticoagulation.  He now comes in for follow-up.  He showed up just for routine appointment  with his PCP and was found to be back in atrial fibrillation he was asymptomatic however he did note that maybe with occasional increased activity he would be a little out of breath.  However he can ride his bike 10 miles without problems and overall feels like he is really not that much different than before.  No real near syncope or syncope no chest pain or pressure.  No orthopnea or PND.  He has chronic lower extremity edema which is unchanged.        Past Medical History:   Diagnosis  Date   • A-fib (CMS/HCC)    • Atrial fibrillation (CMS/HCC)    • Cellulitis    • Chronic lumbar pain    • Dyslipidemia 6/5/2018   • Essential hypertension 4/11/2016   • History of low potassium          Past Surgical History:   Procedure Laterality Date   • COLONOSCOPY  2010   • COLONOSCOPY W/ BIOPSIES  07/18/2013    Dr. Farfan; hyperplastic polyp   • HYDROCELE EXCISION / REPAIR     • INGUINAL HERNIA REPAIR Bilateral 10/02/2007    Dr Hinton   • INGUINAL HERNIA REPAIR  2007    Did a long time ago   • KNEE ARTHROSCOPY Right 1986   • PILONIDAL CYST / SINUS EXCISION  1981   • VASECTOMY  1996         Current Outpatient Medications on File Prior to Visit   Medication Sig Dispense Refill   • apixaban (ELIQUIS) 5 MG tablet tablet Take 1 tablet by mouth Every 12 (Twelve) Hours. 60 tablet 5   • Cetirizine HCl (ZYRTEC PO) Take  by mouth Daily.     • hydroCHLOROthiazide (HYDRODIURIL) 12.5 MG tablet Take 1 tablet by mouth Daily. PRN swelling 90 tablet 1   • lisinopril (PRINIVIL,ZESTRIL) 40 MG tablet Take 1 tablet by mouth Daily. 90 tablet 3   • metoprolol tartrate (LOPRESSOR) 50 MG tablet Take 1 tablet by mouth 2 (Two) Times a Day. 60 tablet 3   • MULTIPLE VITAMIN PO Take  by mouth Daily.     • mupirocin (BACTROBAN) 2 % ointment Apply  topically to the appropriate area as directed 2 (Two) Times a Day. 66 g 1   • [DISCONTINUED] Apixaban (ELIQUIS DVT/PE STARTER PACK) tablet Take two 5 mg tablets by mouth every 12 hours for 7 days. Followed by one 5 mg tablet every 12 hours. (Dispense starter pack if available) 74 tablet 0     No current facility-administered medications on file prior to visit.          Social History     Socioeconomic History   • Marital status:      Spouse name: Not on file   • Number of children: Not on file   • Years of education: Not on file   • Highest education level: Not on file   Tobacco Use   • Smoking status: Never Smoker   • Smokeless tobacco: Never Used   • Tobacco comment: CAFFEINE - coffee    Substance and Sexual Activity   • Alcohol use: Yes     Comment: Kareem if bourbon glass once a month   • Drug use: No   • Sexual activity: Not Currently     Partners: Female     Birth control/protection: Surgical     Comment: Vasectomy   Lifestyle   • Physical activity:     Days per week: 7 days     Minutes per session: 30 min   • Stress: Not on file       Family History   Problem Relation Age of Onset   • Thyroid cancer Mother    • Transient ischemic attack Mother    • Heart attack Father    • Aortic aneurysm Father    • Diabetes type II Sister    • Hypothyroidism Sister    • Diabetes type II Brother    • Diabetes Brother         Type 2   • Multiple sclerosis Brother    • Diabetes Brother         Has MS   • Diabetes type II Brother    • Prostate cancer Brother    • Diabetes Sister         Type 1         Review of Systems   Constitution: Negative for decreased appetite, diaphoresis, fever, malaise/fatigue, weight gain and weight loss.   HENT: Negative for congestion, hearing loss, nosebleeds and tinnitus.    Eyes: Negative for blurred vision, double vision, vision loss in left eye, vision loss in right eye and visual disturbance.   Cardiovascular:        As noted in HPI   Respiratory:        As noted HPI   Endocrine: Negative for cold intolerance and heat intolerance.   Hematologic/Lymphatic: Negative for bleeding problem. Does not bruise/bleed easily.   Skin: Negative for color change, flushing, itching and rash.   Musculoskeletal: Negative for arthritis, back pain, joint pain, joint swelling, muscle weakness and myalgias.   Gastrointestinal: Negative for bloating, abdominal pain, constipation, diarrhea, dysphagia, heartburn, hematemesis, hematochezia, melena, nausea and vomiting.   Genitourinary: Negative for bladder incontinence, dysuria, frequency, nocturia and urgency.   Neurological: Negative for dizziness, focal weakness, headaches, light-headedness, loss of balance, numbness, paresthesias, vertigo and  "weakness.   Psychiatric/Behavioral: Negative for depression, memory loss and substance abuse.       Procedures      ECG 12 Lead  Date/Time: 8/5/2020 1:24 PM  Performed by: Florencio Pradhan MD  Authorized by: Florencio Pradhan MD   Comparison: compared with previous ECG   Similar to previous ECG  Rhythm: atrial fibrillation  Rate: tachycardic  QRS axis: normal                  Objective:    /70 (BP Location: Left arm, Patient Position: Sitting)   Pulse 110   Ht 188 cm (74\")   Wt 110 kg (242 lb)   BMI 31.07 kg/m²        Physical Exam  Physical Exam   Constitutional: He is oriented to person, place, and time. He appears well-developed and well-nourished. No distress.   HENT:   Head: Normocephalic.   Eyes: Pupils are equal, round, and reactive to light. Conjunctivae are normal. No scleral icterus.   Neck: Normal carotid pulses, no hepatojugular reflux and no JVD present. Carotid bruit is not present. No tracheal deviation, no edema and no erythema present. No thyromegaly present.   Cardiovascular: S1 normal, S2 normal, normal heart sounds and intact distal pulses. An irregularly irregular rhythm present.  No extrasystoles are present. Tachycardia present. PMI is not displaced. Exam reveals no gallop, no distant heart sounds and no friction rub.   No murmur heard.  Pulses:       Carotid pulses are 2+ on the right side, and 2+ on the left side.       Radial pulses are 2+ on the right side, and 2+ on the left side.        Femoral pulses are 2+ on the right side, and 2+ on the left side.       Dorsalis pedis pulses are 2+ on the right side, and 2+ on the left side.        Posterior tibial pulses are 2+ on the right side, and 2+ on the left side.   Pulmonary/Chest: Effort normal and breath sounds normal. No respiratory distress. He has no decreased breath sounds. He has no wheezes. He has no rhonchi. He has no rales. He exhibits no tenderness.   Abdominal: Soft. Bowel sounds are normal. He exhibits no distension " and no mass. There is no hepatosplenomegaly. There is no tenderness. There is no rebound and no guarding.   Musculoskeletal: He exhibits edema (2+ L tibial 1+ right tibial). He exhibits no tenderness or deformity.   Neurological: He is alert and oriented to person, place, and time.   Skin: Skin is warm and dry. No rash noted. He is not diaphoretic. No cyanosis or erythema. No pallor. Nails show no clubbing.   Psychiatric: He has a normal mood and affect. His speech is normal and behavior is normal. Judgment and thought content normal.           Assessment:   1.  64-year-old gentleman with paroxysmal atrial fibrillation status post cardioversion.  He was asymptomatic while he was in atrial fibrillation. The patient's CHADS2-VASc score is 1.  Back in atrial fibrillation rate is increased work and increase the metoprolol to 75 mg twice a day.  He is going to see our arrhythmia service will continue the Eliquis.  2.  Hypertension blood pressure under great control as above.  3.  Mild mitral regurgitation on transesophageal echo 11/26/2019.  No murmur.  4.  Cellulitis left lower extremity resolved.  5.  Premature ventricular contraction.  Asymptomatic normal LV function.  6.  History of snoring and witnessed apnea he is agreeable to have sleep study.   He was to have a sleep study however I do not believe he ever followed up with that.  If not we need to get that rescheduled.         Plan:

## 2020-08-06 ENCOUNTER — OFFICE VISIT (OUTPATIENT)
Dept: FAMILY MEDICINE CLINIC | Facility: CLINIC | Age: 65
End: 2020-08-06

## 2020-08-06 ENCOUNTER — TELEPHONE (OUTPATIENT)
Dept: CARDIOLOGY | Facility: CLINIC | Age: 65
End: 2020-08-06

## 2020-08-06 VITALS
HEIGHT: 74 IN | OXYGEN SATURATION: 98 % | TEMPERATURE: 97.8 F | BODY MASS INDEX: 31.32 KG/M2 | SYSTOLIC BLOOD PRESSURE: 118 MMHG | WEIGHT: 244 LBS | HEART RATE: 56 BPM | DIASTOLIC BLOOD PRESSURE: 84 MMHG

## 2020-08-06 DIAGNOSIS — I10 ESSENTIAL HYPERTENSION: ICD-10-CM

## 2020-08-06 DIAGNOSIS — E78.5 DYSLIPIDEMIA: ICD-10-CM

## 2020-08-06 DIAGNOSIS — I48.0 PAROXYSMAL ATRIAL FIBRILLATION (HCC): Primary | ICD-10-CM

## 2020-08-06 PROCEDURE — 99213 OFFICE O/P EST LOW 20 MIN: CPT | Performed by: INTERNAL MEDICINE

## 2020-08-06 RX ORDER — METOPROLOL TARTRATE 75 MG/1
TABLET, FILM COATED ORAL 2 TIMES DAILY
COMMUNITY
End: 2020-08-07 | Stop reason: SDUPTHER

## 2020-08-06 NOTE — PROGRESS NOTES
Subjective   Marcell De Santiago is a 64 y.o. male.     History of Present Illness   Following up for paroxysmal atrial fibrillation.  Patient was seen by the cardiologist who increased his dose of metoprolol to 75 mg twice daily.  Patient is taking Eliquis twice daily.  Patient's blood pressures been running 110s over 70s.  Patient will continue to monitor and follow-up in 6 months.    Dictated utilizing Dragon dictation. If there are questions or for further clarification, please contact me.  The following portions of the patient's history were reviewed and updated as appropriate: allergies, current medications, past family history, past medical history, past social history, past surgical history and problem list.    Review of Systems   Constitutional: Negative for fatigue and fever.   HENT: Positive for congestion. Negative for trouble swallowing.    Eyes: Negative for discharge and visual disturbance.   Respiratory: Negative for choking and shortness of breath.    Cardiovascular: Negative for chest pain and palpitations.   Gastrointestinal: Negative for abdominal pain and blood in stool.   Endocrine: Negative.    Genitourinary: Negative for genital sores and hematuria.   Musculoskeletal: Negative for gait problem and joint swelling.   Skin: Negative for color change, pallor, rash and wound.   Allergic/Immunologic: Positive for environmental allergies. Negative for immunocompromised state.   Neurological: Negative for facial asymmetry and speech difficulty.   Psychiatric/Behavioral: Negative for hallucinations and suicidal ideas.       Objective   Physical Exam   Constitutional: He is oriented to person, place, and time. He appears well-developed and well-nourished.   HENT:   Head: Normocephalic and atraumatic.   Eyes: Pupils are equal, round, and reactive to light. Conjunctivae and EOM are normal.   Neck: Normal range of motion. Neck supple.   Cardiovascular: Normal rate and normal heart sounds.   Pulmonary/Chest:  Effort normal and breath sounds normal.   Abdominal: Soft. Bowel sounds are normal.   Musculoskeletal: Normal range of motion.   Neurological: He is alert and oriented to person, place, and time.   Skin: Skin is warm and dry.   Psychiatric: He has a normal mood and affect. His behavior is normal. Judgment and thought content normal.   Nursing note and vitals reviewed.      Assessment/Plan #1 continue Eliquis, metoprolol.  #2 continue present diet and activity levels.  Marcell was seen today for follow-up.    Diagnoses and all orders for this visit:    Paroxysmal atrial fibrillation (CMS/HCC)    Dyslipidemia    Essential hypertension

## 2020-08-06 NOTE — TELEPHONE ENCOUNTER
----- Message from Razia Mccauley MA sent at 8/6/2020  6:27 PM EDT -----  Regarding: FW: Prescription Question  Contact: [phone]  Please see patient's MY CHART message below.  Thank you/ ANT       ----- Message -----  From: Marcell De Santiago  Sent: 8/6/2020   3:31 PM EDT  To: Jeovanny Valles [facility]  Subject: Prescription Question                            Doc, I wanted to mention 2 things.  It's not on my chart the change you made with the [employer] bumping to 75mg in morning and at night from 50.  Also need to schedule something with your telemetry or other group you mentioned   Thanks I did keep my appt today with Jose Eduardo Wall but probably did not need to go after just seeing you.

## 2020-08-07 ENCOUNTER — TELEPHONE (OUTPATIENT)
Dept: CARDIOLOGY | Facility: CLINIC | Age: 65
End: 2020-08-07

## 2020-08-07 RX ORDER — METOPROLOL TARTRATE 75 MG/1
75 TABLET, FILM COATED ORAL 2 TIMES DAILY
Qty: 180 TABLET | Refills: 2 | Status: SHIPPED | OUTPATIENT
Start: 2020-08-07 | End: 2020-09-03

## 2020-08-07 NOTE — TELEPHONE ENCOUNTER
Josefa,  Can you please call Dianne Anyi and set him up for an appointment to see one of the arrhythmia specialists per Dr. Pradhan?  Thanks/[facility]    # [phone] or # [phone]

## 2020-08-20 ENCOUNTER — OFFICE VISIT (OUTPATIENT)
Dept: CARDIOLOGY | Facility: CLINIC | Age: 65
End: 2020-08-20

## 2020-08-20 ENCOUNTER — TRANSCRIBE ORDERS (OUTPATIENT)
Dept: CARDIOLOGY | Facility: CLINIC | Age: 65
End: 2020-08-20

## 2020-08-20 VITALS
BODY MASS INDEX: 30.93 KG/M2 | HEIGHT: 74 IN | WEIGHT: 241 LBS | SYSTOLIC BLOOD PRESSURE: 128 MMHG | DIASTOLIC BLOOD PRESSURE: 90 MMHG

## 2020-08-20 DIAGNOSIS — Z01.810 PREPROCEDURAL CARDIOVASCULAR EXAMINATION: ICD-10-CM

## 2020-08-20 DIAGNOSIS — I48.19 ATRIAL FIBRILLATION, PERSISTENT (HCC): Primary | ICD-10-CM

## 2020-08-20 DIAGNOSIS — Z13.6 SCREENING FOR CARDIOVASCULAR CONDITION: Primary | ICD-10-CM

## 2020-08-20 PROCEDURE — 93000 ELECTROCARDIOGRAM COMPLETE: CPT | Performed by: INTERNAL MEDICINE

## 2020-08-20 PROCEDURE — 99204 OFFICE O/P NEW MOD 45 MIN: CPT | Performed by: INTERNAL MEDICINE

## 2020-08-20 RX ORDER — AMIODARONE HYDROCHLORIDE 200 MG/1
400 TABLET ORAL 2 TIMES DAILY
Qty: 90 TABLET | Refills: 1 | Status: SHIPPED | OUTPATIENT
Start: 2020-08-20 | End: 2020-09-11

## 2020-08-20 NOTE — PROGRESS NOTES
Date of Office Visit: 2020  Encounter Provider: Brennon Brown MD  Place of Service: [facility] CARDIOLOGY  Patient Name: Marcell De Santiago  :1955    Chief Complaint   Patient presents with   • Atrial Fibrillation     New patient MI ref   :     HPI: Marcell De Santiago is a 64 y.o. male who presents today for evaluation of persistent atrial fibrillation.  The patient was first noted to have atrial fibrillation during hospitalization in 2019 with cellulitis.  During that hospitalization he was cardioverted.  He subsequently had a Holter monitor which showed no atrial fibrillation, and anticoagulation was stopped.  He was seen by his PCP in July, and found to be back in atrial fibrillation.  He associates being back in atrial fibrillation, with increase in constant moderate fatigue, which is particularly noticeable after increase in his beta-blocker.  He has tried to remain active, and still rides his bike up to few miles most weeks, but definitely feels it is less than prior.  He is started back on anticoagulation with Eliquis.        Past Medical History:   Diagnosis Date   • A-fib (CMS/HCC)    • Atrial fibrillation (CMS/HCC)    • Cellulitis    • Chronic lumbar pain    • Dyslipidemia 2018   • Essential hypertension 2016   • History of low potassium        Past Surgical History:   Procedure Laterality Date   • COLONOSCOPY     • COLONOSCOPY W/ BIOPSIES  2013    Dr. Farfan; hyperplastic polyp   • HYDROCELE EXCISION / REPAIR     • INGUINAL HERNIA REPAIR Bilateral 10/02/2007    Dr Hinton   • INGUINAL HERNIA REPAIR      Did a long time ago   • KNEE ARTHROSCOPY Right    • PILONIDAL CYST / SINUS EXCISION     • VASECTOMY         Social History     Socioeconomic History   • Marital status:      Spouse name: Not on file   • Number of children: Not on file   • Years of education: Not on file   • Highest education level: Not on file    Tobacco Use   • Smoking status: Never Smoker   • Smokeless tobacco: Never Used   • Tobacco comment: CAFFEINE - coffee   Substance and Sexual Activity   • Alcohol use: Yes     Comment: Kareem if bourbon glass once a month   • Drug use: No   • Sexual activity: Not Currently     Partners: Female     Birth control/protection: Surgical     Comment: Vasectomy   Lifestyle   • Physical activity:     Days per week: 7 days     Minutes per session: 30 min   • Stress: Not on file       Family History   Problem Relation Age of Onset   • Thyroid cancer Mother    • Transient ischemic attack Mother    • Heart attack Father    • Aortic aneurysm Father    • Diabetes type II Sister    • Hypothyroidism Sister    • Diabetes type II Brother    • Diabetes Brother         Type 2   • Multiple sclerosis Brother    • Diabetes Brother         Has MS   • Diabetes type II Brother    • Prostate cancer Brother    • Diabetes Sister         Type 1       ROS    No Known Allergies      Current Outpatient Medications:   •  apixaban (ELIQUIS) 5 MG tablet tablet, Take 1 tablet by mouth Every 12 (Twelve) Hours., Disp: 60 tablet, Rfl: 5  •  Cetirizine HCl (ZYRTEC PO), Take  by mouth Daily., Disp: , Rfl:   •  hydroCHLOROthiazide (HYDRODIURIL) 12.5 MG tablet, Take 1 tablet by mouth Daily. PRN swelling, Disp: 90 tablet, Rfl: 1  •  lisinopril (PRINIVIL,ZESTRIL) 40 MG tablet, Take 1 tablet by mouth Daily., Disp: 90 tablet, Rfl: 3  •  Metoprolol Tartrate 75 MG tablet, Take 75 mg by mouth 2 (two) times a day., Disp: 180 tablet, Rfl: 2  •  MULTIPLE VITAMIN PO, Take  by mouth Daily., Disp: , Rfl:   •  mupirocin (BACTROBAN) 2 % ointment, Apply  topically to the appropriate area as directed 2 (Two) Times a Day., Disp: 66 g, Rfl: 1  •  amiodarone (PACERONE) 200 MG tablet, Take 2 tablets by mouth 2 (Two) Times a Day. Take 400 mg BID until cardioversion in two weeks, then 200 mg daily, Disp: 90 tablet, Rfl: 1      Objective:     Vitals:    08/20/20 1255   BP: 128/90  "  BP Location: Right arm   Patient Position: Sitting   Cuff Size: Large Adult   Weight: 109 kg (241 lb)   Height: 188 cm (74\")     Body mass index is 30.94 kg/m².    PHYSICAL EXAM:    Physical Exam   Constitutional: He is oriented to person, place, and time. He appears well-developed and well-nourished. No distress.   HENT:   Head: Normocephalic and atraumatic.   Eyes: Right eye exhibits no discharge. Left eye exhibits no discharge.   Neck: No JVD present. No tracheal deviation present.   Cardiovascular: Normal rate. An irregularly irregular rhythm present.   Pulmonary/Chest: Effort normal and breath sounds normal.   Musculoskeletal: He exhibits no edema.   Neurological: He is alert and oriented to person, place, and time.   Skin: Skin is warm and dry.   Psychiatric: He has a normal mood and affect. His behavior is normal. Judgment and thought content normal.   Nursing note and vitals reviewed.          ECG 12 Lead  Date/Time: 8/20/2020 2:48 PM  Performed by: Brennon Brown MD  Authorized by: Brennon Brown MD   Comparison: compared with previous ECG from 8/5/2020  Similar to previous ECG  Rhythm: atrial fibrillation    Clinical impression: abnormal EKG  Comments: Atrial fibrillation, with mild RVR.  QT interval is felt to be acceptable.              Assessment:       Diagnosis Plan   1. Atrial fibrillation, persistent (CMS/HCC)  Cardioversion External in Cardiology Department          Plan:       Patient with persistent atrial fibrillation, associated with symptoms of fatigue and breathlessness.  We had a long discussion about rhythm management.  I think the patient really desires to be off medication, but we discussed that that was not a possibility at this time.  After discussion of plan will be to start amiodarone 400 mg twice daily, as he does not wish to be in the hospital to start Tikosyn.  We then plan on cardioversion in a couple weeks.  If he feels much improved in sinus rhythm, we can " discuss possible ablation, or hybrid ablation.    As always, it has been a pleasure to participate in your patient's care.      Sincerely,         Brennon Brown MD

## 2020-08-25 ENCOUNTER — TRANSCRIBE ORDERS (OUTPATIENT)
Dept: SLEEP MEDICINE | Facility: HOSPITAL | Age: 65
End: 2020-08-25

## 2020-08-25 DIAGNOSIS — Z01.818 OTHER SPECIFIED PRE-OPERATIVE EXAMINATION: Primary | ICD-10-CM

## 2020-09-03 ENCOUNTER — OFFICE VISIT (OUTPATIENT)
Dept: CARDIOLOGY | Facility: CLINIC | Age: 65
End: 2020-09-03

## 2020-09-03 VITALS
HEIGHT: 74 IN | HEART RATE: 115 BPM | SYSTOLIC BLOOD PRESSURE: 140 MMHG | WEIGHT: 242.4 LBS | BODY MASS INDEX: 31.11 KG/M2 | DIASTOLIC BLOOD PRESSURE: 100 MMHG

## 2020-09-03 DIAGNOSIS — I48.19 ATRIAL FIBRILLATION, PERSISTENT (HCC): Primary | ICD-10-CM

## 2020-09-03 DIAGNOSIS — I49.3 PVC (PREMATURE VENTRICULAR CONTRACTION): ICD-10-CM

## 2020-09-03 DIAGNOSIS — I10 ESSENTIAL HYPERTENSION: ICD-10-CM

## 2020-09-03 PROCEDURE — 99213 OFFICE O/P EST LOW 20 MIN: CPT | Performed by: INTERNAL MEDICINE

## 2020-09-03 PROCEDURE — 93000 ELECTROCARDIOGRAM COMPLETE: CPT | Performed by: INTERNAL MEDICINE

## 2020-09-03 RX ORDER — METOPROLOL TARTRATE 100 MG/1
100 TABLET ORAL 2 TIMES DAILY
Qty: 60 TABLET | Refills: 11 | Status: ON HOLD | OUTPATIENT
Start: 2020-09-03 | End: 2020-10-08 | Stop reason: SDUPTHER

## 2020-09-03 NOTE — PROGRESS NOTES
Date of Office Visit: 20  Encounter Provider: Florencio Pradhan MD  Place of Service: [facility] CARDIOLOGY  Patient Name: Marcell De Santiago  :1955  Referral Provider:No ref. provider found      No chief complaint on file.    History of Present Illness  Marcell De Santiago is a 64 y.o. male  with history of hypertension, hyperlipidemia, intraventricular contraction, and atrial fibrillation.He reports having a stress test over 10 years ago which was normal.  He was admitted in 2019.  He was having fever, chills and body aches at home.  He then noticed sudden left leg swelling.  He was diagnosed with left leg cellulitis and sepsis.  He required IV fluid and IV antibiotic.  During hospitalization he had rapid atrial fibrillation.  He was asymptomatic with that.  Echocardiogram showed normal left ventricular systolic function with an EF of 54% and diastolic function.  There was thickening of the right coronary cusp of the aortic valve.  Transesophageal echocardiogram showed ejection fraction approximately 40-45% with no thrombus.  There was mild mitral regurgitation and negative saline test results.  He was cardioverted to normal sinus rhythm and was placed on metoprolol.  Lisinopril was held to favor metoprolol.  He was started on Eliquis for anticoagulation.  He now comes in for follow-up.  Saw arrhythmia service who felt that they would not proceed with ablation first but would consider cardioversion and felt he needed that he did not want to come in 3 days for Tikosyn so he was to start amiodarone 3 days a week and then get cardioverted in a couple weeks but unfortunately he is confused and not sure what he wants to do so he had did not start the amiodarone.  He still has fatigue tiredness when he exerts himself no orthopnea or PND in addition his blood pressures been high and he brought in a number of those.  He does have some palpitations but denies near syncope or  syncope.  No blood in his stool or black tarry stools.  No abrupt loss of vision, paralysis, paresthesia, or dysarthria.        Past Medical History:   Diagnosis Date   • A-fib (CMS/HCC)    • Atrial fibrillation (CMS/HCC)    • Cellulitis    • Chronic lumbar pain    • Dyslipidemia 6/5/2018   • Essential hypertension 4/11/2016   • History of low potassium          Past Surgical History:   Procedure Laterality Date   • COLONOSCOPY  2010   • COLONOSCOPY W/ BIOPSIES  07/18/2013    Dr. Farfan; hyperplastic polyp   • HYDROCELE EXCISION / REPAIR     • INGUINAL HERNIA REPAIR Bilateral 10/02/2007    Dr Hinton   • INGUINAL HERNIA REPAIR  2007    Did a long time ago   • KNEE ARTHROSCOPY Right 1986   • PILONIDAL CYST / SINUS EXCISION  1981   • VASECTOMY  1996         Current Outpatient Medications on File Prior to Visit   Medication Sig Dispense Refill   • amiodarone (PACERONE) 200 MG tablet Take 2 tablets by mouth 2 (Two) Times a Day. Take 400 mg BID until cardioversion in two weeks, then 200 mg daily 90 tablet 1   • apixaban (ELIQUIS) 5 MG tablet tablet Take 1 tablet by mouth Every 12 (Twelve) Hours. 60 tablet 5   • Cetirizine HCl (ZYRTEC PO) Take  by mouth Daily.     • hydroCHLOROthiazide (HYDRODIURIL) 12.5 MG tablet Take 1 tablet by mouth Daily. PRN swelling 90 tablet 1   • lisinopril (PRINIVIL,ZESTRIL) 40 MG tablet Take 1 tablet by mouth Daily. 90 tablet 3   • Metoprolol Tartrate 75 MG tablet Take 75 mg by mouth 2 (two) times a day. 180 tablet 2   • MULTIPLE VITAMIN PO Take  by mouth Daily.     • mupirocin (BACTROBAN) 2 % ointment Apply  topically to the appropriate area as directed 2 (Two) Times a Day. 66 g 1     No current facility-administered medications on file prior to visit.          Social History     Socioeconomic History   • Marital status:      Spouse name: Not on file   • Number of children: Not on file   • Years of education: Not on file   • Highest education level: Not on file   Tobacco Use   • Smoking  status: Never Smoker   • Smokeless tobacco: Never Used   • Tobacco comment: CAFFEINE - coffee   Substance and Sexual Activity   • Alcohol use: Yes     Comment: Kareem if bourbon glass once a month   • Drug use: No   • Sexual activity: Not Currently     Partners: Female     Birth control/protection: Surgical     Comment: Vasectomy   Lifestyle   • Physical activity:     Days per week: 7 days     Minutes per session: 30 min   • Stress: Not on file       Family History   Problem Relation Age of Onset   • Thyroid cancer Mother    • Transient ischemic attack Mother    • Heart attack Father    • Aortic aneurysm Father    • Diabetes type II Sister    • Hypothyroidism Sister    • Diabetes type II Brother    • Diabetes Brother         Type 2   • Multiple sclerosis Brother    • Diabetes Brother         Has MS   • Diabetes type II Brother    • Prostate cancer Brother    • Diabetes Sister         Type 1         Review of Systems   Constitution: Positive for malaise/fatigue. Negative for decreased appetite, diaphoresis, fever, weight gain and weight loss.   HENT: Negative for congestion, hearing loss, nosebleeds and tinnitus.    Eyes: Negative for blurred vision, double vision, vision loss in left eye, vision loss in right eye and visual disturbance.   Cardiovascular:        As noted in HPI   Respiratory:        As noted HPI   Endocrine: Negative for cold intolerance and heat intolerance.   Hematologic/Lymphatic: Negative for bleeding problem. Does not bruise/bleed easily.   Skin: Negative for color change, flushing, itching and rash.   Musculoskeletal: Negative for arthritis, back pain, joint pain, joint swelling, muscle weakness and myalgias.   Gastrointestinal: Negative for bloating, abdominal pain, constipation, diarrhea, dysphagia, heartburn, hematemesis, hematochezia, melena, nausea and vomiting.   Genitourinary: Negative for bladder incontinence, dysuria, frequency, nocturia and urgency.   Neurological: Negative for  dizziness, focal weakness, headaches, light-headedness, loss of balance, numbness, paresthesias, vertigo and weakness.   Psychiatric/Behavioral: Negative for depression, memory loss and substance abuse.       Procedures      ECG 12 Lead  Date/Time: 9/3/2020 9:58 AM  Performed by: Florencio Pradhan MD  Authorized by: Florencio Pradhan MD   Comparison: compared with previous ECG   Similar to previous ECG  Rhythm: atrial fibrillation  Rate: normal  QRS axis: normal                  Objective:    There were no vitals taken for this visit.       Physical Exam  Physical Exam   Constitutional: He is oriented to person, place, and time. He appears well-developed and well-nourished. No distress.   HENT:   Head: Normocephalic.   Eyes: Pupils are equal, round, and reactive to light. Conjunctivae are normal. No scleral icterus.   Neck: Normal carotid pulses, no hepatojugular reflux and no JVD present. Carotid bruit is not present. No tracheal deviation, no edema and no erythema present. No thyromegaly present.   Cardiovascular: S1 normal, S2 normal, normal heart sounds and intact distal pulses. An irregularly irregular rhythm present.  No extrasystoles are present. Tachycardia present. PMI is not displaced. Exam reveals no gallop, no distant heart sounds and no friction rub.   No murmur heard.  Pulses:       Carotid pulses are 2+ on the right side, and 2+ on the left side.       Radial pulses are 2+ on the right side, and 2+ on the left side.        Femoral pulses are 2+ on the right side, and 2+ on the left side.       Dorsalis pedis pulses are 2+ on the right side, and 2+ on the left side.        Posterior tibial pulses are 2+ on the right side, and 2+ on the left side.   Pulmonary/Chest: Effort normal and breath sounds normal. No respiratory distress. He has no decreased breath sounds. He has no wheezes. He has no rhonchi. He has no rales. He exhibits no tenderness.   Abdominal: Soft. Bowel sounds are normal. He exhibits  no distension and no mass. There is no hepatosplenomegaly. There is no tenderness. There is no rebound and no guarding.   Musculoskeletal: He exhibits edema (2+ edema). He exhibits no tenderness or deformity.   Neurological: He is alert and oriented to person, place, and time.   Skin: Skin is warm and dry. No rash noted. He is not diaphoretic. No cyanosis or erythema. No pallor. Nails show no clubbing.   Psychiatric: He has a normal mood and affect. His speech is normal and behavior is normal. Judgment and thought content normal.           Assessment:   1.  64-year-old gentleman with paroxysmal atrial fibrillation status post cardioversion.  Then back into atrial fibrillation.  The patient's CHADS2-VASc score is 1.  He is on Eliquis rate still fast we will increase his metoprolol 200 mg twice a day.  We had a long discussion with him I think he is symptomatic enough that he ought to have cardioversion I would not choose amiodarone over Tikosyn and just for coming into the hospital 3 days I think the best choice would be for him to come in and start the Tikosyn and get cardioverted as opposed amiodarone.  He is going to think about that and will let us know.  2.  Hypertension blood pressure elevated.  3.  Mild mitral regurgitation on transesophageal echo 11/26/2019.  No murmur.  4.  Cellulitis left lower extremity resolved.  5.  Premature ventricular contraction.  Asymptomatic normal LV function.           Plan:

## 2020-09-04 ENCOUNTER — TELEPHONE (OUTPATIENT)
Dept: CARDIOLOGY | Facility: CLINIC | Age: 65
End: 2020-09-04

## 2020-09-04 NOTE — TELEPHONE ENCOUNTER
Pt's wife called.  She states Marcell would like to cancel his cardioversion for 9/9/20.  They would like to talk/think more about Amiodarone vs Tikosyn and the cardioversion. They plan to make a decision over the weekend and call back early next week.  Thanks/stefanf    # [phone]

## 2020-09-07 ENCOUNTER — APPOINTMENT (OUTPATIENT)
Dept: LAB | Facility: HOSPITAL | Age: 65
End: 2020-09-07

## 2020-09-09 ENCOUNTER — APPOINTMENT (OUTPATIENT)
Dept: CARDIOLOGY | Facility: HOSPITAL | Age: 65
End: 2020-09-09

## 2020-09-11 RX ORDER — FUROSEMIDE 20 MG/1
20 TABLET ORAL DAILY
Qty: 30 TABLET | Refills: 5 | Status: SHIPPED | OUTPATIENT
Start: 2020-09-11 | End: 2020-09-23 | Stop reason: SDUPTHER

## 2020-09-11 NOTE — TELEPHONE ENCOUNTER
Patient and wife would like to speak with Dr. Brown regarding questions about cardioversion.    He can be reached at [phone].    Thanks!

## 2020-09-11 NOTE — TELEPHONE ENCOUNTER
S/W patient. HR better 60-80 bpm.   /83 today 133/89     Swelling unchanged since LOV but Increased SOB and + orthopnea.  Stop HCTZ start Lasix 20 mg daily. Will follow up next week with him via telephone.

## 2020-09-11 NOTE — TELEPHONE ENCOUNTER
Pt called and also left a message on Dr. Pradhan's voicemail.  He states he seems to be more short of air since Dr. Pradhan increased his Metoprolol and also has some edema.  Thanks/[facility]    # [phone] or # [phone]

## 2020-09-15 ENCOUNTER — TELEPHONE (OUTPATIENT)
Dept: CARDIOLOGY | Facility: CLINIC | Age: 65
End: 2020-09-15

## 2020-09-15 DIAGNOSIS — Z51.81 ENCOUNTER FOR THERAPEUTIC DRUG LEVEL MONITORING: Primary | ICD-10-CM

## 2020-09-15 NOTE — TELEPHONE ENCOUNTER
Phone# [phone]  oe wife Afsaneh #[phone]    Patient returned your call.  He said his edema is better but he is still having SOA.  Griselda

## 2020-09-15 NOTE — TELEPHONE ENCOUNTER
Spoke with patient. Still describes orthopnea but swelling better.  BP and heart rate stable. He is to double lasix today and tomorrow. BMP thursday with [facility]. We'll fax the order and he is to call and make an appointment.

## 2020-09-15 NOTE — TELEPHONE ENCOUNTER
----- Message from STEPHANY Trinh sent at 9/11/2020  2:15 PM EDT -----  Called and left voicemail on cell and home  to see if he is breathing  better on lasix ? Orthopnea?

## 2020-09-18 ENCOUNTER — TELEPHONE (OUTPATIENT)
Dept: CARDIOLOGY | Facility: CLINIC | Age: 65
End: 2020-09-18

## 2020-09-18 ENCOUNTER — PATIENT MESSAGE (OUTPATIENT)
Dept: CARDIOLOGY | Facility: CLINIC | Age: 65
End: 2020-09-18

## 2020-09-18 DIAGNOSIS — I48.19 ATRIAL FIBRILLATION, PERSISTENT (HCC): Primary | ICD-10-CM

## 2020-09-18 LAB
AMBIG ABBREV BMP8 DEFAULT: NORMAL
BUN SERPL-MCNC: 18 MG/DL (ref 8–27)
BUN/CREAT SERPL: 15 (ref 10–24)
CALCIUM SERPL-MCNC: 9.4 MG/DL (ref 8.6–10.2)
CHLORIDE SERPL-SCNC: 100 MMOL/L (ref 96–106)
CO2 SERPL-SCNC: 24 MMOL/L (ref 20–29)
CREAT SERPL-MCNC: 1.17 MG/DL (ref 0.76–1.27)
GLUCOSE SERPL-MCNC: 101 MG/DL (ref 65–99)
POTASSIUM SERPL-SCNC: 4.6 MMOL/L (ref 3.5–5.2)
SODIUM SERPL-SCNC: 143 MMOL/L (ref 134–144)

## 2020-09-18 NOTE — TELEPHONE ENCOUNTER
Pt called this morning to let you know he has decided to move forward with rescheduling the CV and Tikosyn admit....No

## 2020-09-18 NOTE — TELEPHONE ENCOUNTER
Patient notified of results and verbalized understanding    I have scheduled him to see you next week  Afsaneh Fortune RN  Triage nurse

## 2020-09-18 NOTE — TELEPHONE ENCOUNTER
Please let patient know kidney function and potassium level are good and to continue lasix 20 mg daily. Hopefully his breathing is slowly improving. Also offer him an appointment next week if breathing is not better.      Thanks for your help

## 2020-09-21 ENCOUNTER — TELEPHONE (OUTPATIENT)
Dept: CARDIOLOGY | Facility: CLINIC | Age: 65
End: 2020-09-21

## 2020-09-21 DIAGNOSIS — Z51.81 ENCOUNTER FOR THERAPEUTIC DRUG LEVEL MONITORING: Primary | ICD-10-CM

## 2020-09-21 NOTE — TELEPHONE ENCOUNTER
From: Marcell De Santiago  To: STEPHANY Trinh  Sent: 9/18/2020 2:38 PM EDT  Subject: Visit Follow-Up Question    Christel, ok I spoke with your assist and got appt scheduled   I wanted to say I am still having shortness of breath and affecting my sleeping last night.  Here are my numbers after adding Lasx  9/18. 145/99 (68) 144/85 (51) 126/102 (57)    9/17 139/92 (62 144/94. 49   146/90 55    9/16. 140/101. 65. 121/96 66   138/66 70    9/15 133/110 47. 147/107. 57   144/98. 82    9/14. 142/91. 68 140/86. 61   139/86. 50    9/13 140/110. 55. 143/101. 68. 140/96. 54    9/12 141/96. 68. 139/102 50   132/104. 54    9/11. 134/90 63. 133/98. 70   140/99. 56    9/10. 133/100. 69. 127/100. 66. 124/86. 57    Any thoughts on med change to help

## 2020-09-23 ENCOUNTER — LAB (OUTPATIENT)
Dept: LAB | Facility: HOSPITAL | Age: 65
End: 2020-09-23

## 2020-09-23 ENCOUNTER — TELEPHONE (OUTPATIENT)
Dept: CARDIOLOGY | Facility: CLINIC | Age: 65
End: 2020-09-23

## 2020-09-23 ENCOUNTER — OFFICE VISIT (OUTPATIENT)
Dept: CARDIOLOGY | Facility: CLINIC | Age: 65
End: 2020-09-23

## 2020-09-23 VITALS
SYSTOLIC BLOOD PRESSURE: 120 MMHG | BODY MASS INDEX: 30.03 KG/M2 | DIASTOLIC BLOOD PRESSURE: 82 MMHG | WEIGHT: 234 LBS | HEIGHT: 74 IN | HEART RATE: 96 BPM

## 2020-09-23 DIAGNOSIS — R06.09 DOE (DYSPNEA ON EXERTION): ICD-10-CM

## 2020-09-23 DIAGNOSIS — I48.19 ATRIAL FIBRILLATION, PERSISTENT (HCC): Primary | ICD-10-CM

## 2020-09-23 DIAGNOSIS — Z51.81 ENCOUNTER FOR THERAPEUTIC DRUG LEVEL MONITORING: ICD-10-CM

## 2020-09-23 DIAGNOSIS — I50.31 ACUTE DIASTOLIC CONGESTIVE HEART FAILURE (HCC): ICD-10-CM

## 2020-09-23 LAB
ANION GAP SERPL CALCULATED.3IONS-SCNC: 12 MMOL/L (ref 5–15)
BUN SERPL-MCNC: 17 MG/DL (ref 8–23)
BUN/CREAT SERPL: 17.5 (ref 7–25)
CALCIUM SPEC-SCNC: 9.2 MG/DL (ref 8.6–10.5)
CHLORIDE SERPL-SCNC: 103 MMOL/L (ref 98–107)
CO2 SERPL-SCNC: 27 MMOL/L (ref 22–29)
CREAT SERPL-MCNC: 0.97 MG/DL (ref 0.76–1.27)
GFR SERPL CREATININE-BSD FRML MDRD: 78 ML/MIN/1.73
GLUCOSE SERPL-MCNC: 107 MG/DL (ref 65–99)
NT-PROBNP SERPL-MCNC: 2327 PG/ML (ref 0–900)
POTASSIUM SERPL-SCNC: 4.4 MMOL/L (ref 3.5–5.2)
SODIUM SERPL-SCNC: 142 MMOL/L (ref 136–145)

## 2020-09-23 PROCEDURE — 80048 BASIC METABOLIC PNL TOTAL CA: CPT

## 2020-09-23 PROCEDURE — 83880 ASSAY OF NATRIURETIC PEPTIDE: CPT | Performed by: NURSE PRACTITIONER

## 2020-09-23 PROCEDURE — 36415 COLL VENOUS BLD VENIPUNCTURE: CPT

## 2020-09-23 PROCEDURE — 99214 OFFICE O/P EST MOD 30 MIN: CPT | Performed by: NURSE PRACTITIONER

## 2020-09-23 PROCEDURE — 93000 ELECTROCARDIOGRAM COMPLETE: CPT | Performed by: NURSE PRACTITIONER

## 2020-09-23 RX ORDER — FUROSEMIDE 40 MG/1
40 TABLET ORAL DAILY
Qty: 30 TABLET | Refills: 1 | Status: SHIPPED | OUTPATIENT
Start: 2020-09-23 | End: 2020-11-04

## 2020-09-23 NOTE — TELEPHONE ENCOUNTER
Spoke with patient.  Renal function stable.  His proBNP is significantly elevated although he is actually feeling some better. He will double lasix to 40 mg twice daily x 3 days (starting today) then he will resume 40 mg daily on Saturday. He verbalized understanding. New prescription sent.      AL

## 2020-09-23 NOTE — PROGRESS NOTES
Date of Office Visit: 20  Encounter Provider: STEPHANY Trinh  Place of Service: [facility] CARDIOLOGY  Patient Name: Marcell De Santiago  :1955    Chief Complaint   Patient presents with   • Paroxysmal atrial fibrillation (CMS/HCC)   • Follow-up   :     HPI: Marcell De Santiago is a 64 y.o. male  with history of hypertension, hyperlipidemia, intraventricular contraction, and atrial fibrillation.  He is followed by Dr. Pradhan. I will visit with him in follow up today.     He reports having a stress test over 10 years ago which was normal.  He was admitted in 2019.  He was having fever, chills and body aches at home.  He then noticed sudden left leg swelling.  He was diagnosed with left leg cellulitis and sepsis.  He required IV fluid and IV antibiotic.  During hospitalization he had rapid atrial fibrillation.  He was asymptomatic with that.  Echocardiogram showed normal left ventricular systolic function with an EF of 54% and diastolic function.  There was thickening of the right coronary cusp of the aortic valve.  Transesophageal echocardiogram showed ejection fraction approximately 40-45% with no thrombus.  There was mild mitral regurgitation and negative saline test results.  He was cardioverted to normal sinus rhythm and was placed on metoprolol.  Lisinopril was held to favor metoprolol.  He was started on Eliquis for anticoagulation.   In follow up with his PCP, his metoprolol was cut in half and he was restarted on Prinzide once daily. He was prescribed Bactrim as well.  He reported history of snoring and wife confirmed witnessed apnea and was referred to sleep medicine. Follow up 18 day mobile telemetry device showed sinus rhythm occasional supraventricular beats, premature ventricular beats accounting for 4.7 % of the recording. Since there was no a fib, eliquis was stopped and he was to start aspirin 81 mg daily.  He did have recurrent atrial fibrillation and  also elevated heart rate.  His metoprolol was increased to 100 mg twice daily.  He was evaluated by the arrhythmia service who felt that ablation was not needed.  There was mention of cardioversion or antiarrhythmic medication.  Patient did not start amiodarone and did not want to be admitted for 3 days for Tikosyn. He was back on eliquis.  He then called complaining of increased shortness of breath, described orthopnea and increased lower extremity edema.  His HCTZ was stopped and he was started on Lasix 20 mg daily which was increased to 40 mg daily.    We visit today in follow-up.  Yesterday patient took 60 mg of Lasix and took 40 mg today.  He has had improved orthopnea and shortness of breath with exertion.  His lower extremities are back to his normal.  He denies palpitation, edema, lightheadedness, chest pain tightness pressure.  He has fatigue at the end of the day.  He is having no bleeding issues.  He plans to be admitted for Tikosyn in less than 2 weeks.           No Known Allergies    Past Medical History:   Diagnosis Date   • A-fib (CMS/HCC)    • Atrial fibrillation (CMS/HCC)    • Cellulitis    • Chronic lumbar pain    • Dyslipidemia 6/5/2018   • Essential hypertension 4/11/2016   • History of low potassium        Past Surgical History:   Procedure Laterality Date   • COLONOSCOPY  2010   • COLONOSCOPY W/ BIOPSIES  07/18/2013    Dr. Farfan; hyperplastic polyp   • HYDROCELE EXCISION / REPAIR     • INGUINAL HERNIA REPAIR Bilateral 10/02/2007    Dr Hinton   • INGUINAL HERNIA REPAIR  2007    Did a long time ago   • KNEE ARTHROSCOPY Right 1986   • PILONIDAL CYST / SINUS EXCISION  1981   • VASECTOMY  1996         Family and social history reviewed.     Review of Systems   Constitution: Positive for malaise/fatigue.   Cardiovascular: Positive for dyspnea on exertion.     All other systems were reviewed and are negative          Objective:     Vitals:    09/23/20 0852   BP: 120/82   BP Location: Left arm  "  Patient Position: Sitting   Pulse: 96   Weight: 106 kg (234 lb)   Height: 188 cm (74\")     Body mass index is 30.04 kg/m².    PHYSICAL EXAM:  Constitutional:       General: Not in acute distress.     Appearance: Well-developed. Not diaphoretic.   Eyes:      Conjunctiva/sclera: Conjunctivae normal.   HENT:      Head: Normocephalic.   Neck:      Musculoskeletal: Normal range of motion.      Vascular: No JVD.   Pulmonary:      Effort: Pulmonary effort is normal. No respiratory distress.      Breath sounds: Normal breath sounds. No wheezing. No rhonchi. No rales.   Chest:      Chest wall: Not tender to palpatation.   Cardiovascular:      Normal rate. Irregularly irregular rhythm.   Abdominal:      General: Bowel sounds are normal. There is no distension.      Palpations: Abdomen is soft.   Musculoskeletal: Normal range of motion.   Skin:     General: Skin is warm and dry. There is no cyanosis.      Findings: No rash.   Neurological:      Mental Status: Alert and oriented to person, place, and time.   Psychiatric:         Behavior: Behavior normal.         Thought Content: Thought content normal.         Judgment: Judgment normal.           ECG 12 Lead    Date/Time: 9/23/2020 9:03 AM  Performed by: Christel Rahman APRN  Authorized by: Christel Rahman APRN   Comparison: compared with previous ECG   Similar to previous ECG  Rhythm: atrial fibrillation  Rate: normal  QRS axis: right  Other findings: prolonged QTc interval    Clinical impression: abnormal EKG            Current Outpatient Medications   Medication Sig Dispense Refill   • apixaban (ELIQUIS) 5 MG tablet tablet Take 1 tablet by mouth Every 12 (Twelve) Hours. 60 tablet 5   • Cetirizine HCl (ZYRTEC PO) Take  by mouth Daily.     • furosemide (LASIX) 20 MG tablet Take 1 tablet by mouth Daily. (Patient taking differently: Take 20 mg by mouth. Take 2 tablets every morning) 30 tablet 5   • lisinopril (PRINIVIL,ZESTRIL) 40 MG tablet Take 1 tablet by mouth Daily. 90 " tablet 3   • metoprolol tartrate (LOPRESSOR) 100 MG tablet Take 1 tablet by mouth 2 (Two) Times a Day. 60 tablet 11   • MULTIPLE VITAMIN PO Take  by mouth Daily.       No current facility-administered medications for this visit.      Assessment:       Diagnosis Plan   1. Atrial fibrillation, persistent (CMS/HCC)  ECG 12 Lead   2. GARCIA (dyspnea on exertion)  proBNP   3. Acute diastolic congestive heart failure (CMS/HCC)          Orders Placed This Encounter   Procedures   • proBNP   • ECG 12 Lead     This order was created via procedure documentation         Plan:       1. 64-year-old gentleman with paroxysmal atrial fibrillation in the setting of infection/cellulitis. He had a cardioversion.  Then recurrent atrial fibrillation now persistent and rate controlled CHADS2-VASc score is 1. Maintained on Eliquis and metoprolol.  -He is scheduled for Tikosyn admit on 10/6/2020  2.  Hypertension blood pressure appears adequate on average continue the same  3.  Mild mitral regurgitation on transesophageal echo 11/26/2019  4.   Premature ventricular contraction  5.  History of snoring and witnessed apnea-this needs to be revisited  6.  Diastolic congestive heart failure he had orthopnea increased lower extremity edema and shortness of breath with exertion he is doing better on furosemide 40 mg daily.  He is to have a proBNP today and BMP if those are stable continue 40 mg Lasix and see us in follow-up after his Tikosyn admit.    They are to call with questions or concerns.            It has been a pleasure to participate in this patient's care.      Thank you,  STEPHANY Trinh      **I used Dragon to dictate this note:**

## 2020-10-01 ENCOUNTER — TRANSCRIBE ORDERS (OUTPATIENT)
Dept: SLEEP MEDICINE | Facility: HOSPITAL | Age: 65
End: 2020-10-01

## 2020-10-01 DIAGNOSIS — Z01.818 OTHER SPECIFIED PRE-OPERATIVE EXAMINATION: Primary | ICD-10-CM

## 2020-10-03 ENCOUNTER — TELEPHONE (OUTPATIENT)
Dept: CARDIOLOGY | Facility: CLINIC | Age: 65
End: 2020-10-03

## 2020-10-03 NOTE — TELEPHONE ENCOUNTER
Patient called this weekend, he has gained 5 pounds since 10/1 and was short of air the night of 10/2 when he laid down and has some swelling in his left foot.  He wanted to see if he could increase his Lasix to 40 mg BID. He will be coming in Tuesday for his Tikosyn prior to his cardioversion. I discussed with Dr. Mota and he agreed to increase his lasix to BID through Tuesday. He will be coming in Tuesday so there is no need to check back in unless he is not getting better or symptoms are getting worse.   Patient reported his /101 HR 53, 134/108 HR 73, and 138/98 HR 53. He reports that he takes his BP back to back and before he takes his meds.  I educated him on taking his blood pressure 2 hours after he takes his metoprolol and lisinopril, not before. He verbalized understanding and agreed with plan.

## 2020-10-05 ENCOUNTER — LAB (OUTPATIENT)
Dept: LAB | Facility: HOSPITAL | Age: 65
End: 2020-10-05

## 2020-10-05 DIAGNOSIS — Z01.818 OTHER SPECIFIED PRE-OPERATIVE EXAMINATION: ICD-10-CM

## 2020-10-05 PROCEDURE — C9803 HOPD COVID-19 SPEC COLLECT: HCPCS

## 2020-10-05 PROCEDURE — U0004 COV-19 TEST NON-CDC HGH THRU: HCPCS

## 2020-10-06 ENCOUNTER — HOSPITAL ENCOUNTER (INPATIENT)
Facility: HOSPITAL | Age: 65
LOS: 2 days | Discharge: HOME | End: 2020-10-08
Attending: INTERNAL MEDICINE | Admitting: INTERNAL MEDICINE

## 2020-10-06 PROBLEM — I48.91 A-FIB: Status: ACTIVE | Noted: 2020-10-06

## 2020-10-06 LAB
ALBUMIN SERPL-MCNC: 4.2 G/DL (ref 3.5–5.2)
ALBUMIN/GLOB SERPL: 1.8 G/DL
ALP SERPL-CCNC: 62 U/L (ref 39–117)
ALT SERPL W P-5'-P-CCNC: 33 U/L (ref 1–41)
ANION GAP SERPL CALCULATED.3IONS-SCNC: 10.9 MMOL/L (ref 5–15)
AST SERPL-CCNC: 25 U/L (ref 1–40)
BILIRUB SERPL-MCNC: 1 MG/DL (ref 0–1.2)
BUN SERPL-MCNC: 18 MG/DL (ref 8–23)
BUN/CREAT SERPL: 19.4 (ref 7–25)
CALCIUM SPEC-SCNC: 8.9 MG/DL (ref 8.6–10.5)
CHLORIDE SERPL-SCNC: 104 MMOL/L (ref 98–107)
CO2 SERPL-SCNC: 25.1 MMOL/L (ref 22–29)
CREAT SERPL-MCNC: 0.93 MG/DL (ref 0.76–1.27)
DEPRECATED RDW RBC AUTO: 37.4 FL (ref 37–54)
ERYTHROCYTE [DISTWIDTH] IN BLOOD BY AUTOMATED COUNT: 12.5 % (ref 12.3–15.4)
GFR SERPL CREATININE-BSD FRML MDRD: 82 ML/MIN/1.73
GLOBULIN UR ELPH-MCNC: 2.3 GM/DL
GLUCOSE SERPL-MCNC: 92 MG/DL (ref 65–99)
HCT VFR BLD AUTO: 47.6 % (ref 37.5–51)
HGB BLD-MCNC: 16.3 G/DL (ref 13–17.7)
MAGNESIUM SERPL-MCNC: 2.1 MG/DL (ref 1.6–2.4)
MCH RBC QN AUTO: 28.8 PG (ref 26.6–33)
MCHC RBC AUTO-ENTMCNC: 34.2 G/DL (ref 31.5–35.7)
MCV RBC AUTO: 84.2 FL (ref 79–97)
PLATELET # BLD AUTO: 246 10*3/MM3 (ref 140–450)
PMV BLD AUTO: 11 FL (ref 6–12)
POTASSIUM SERPL-SCNC: 3.9 MMOL/L (ref 3.5–5.2)
PROT SERPL-MCNC: 6.5 G/DL (ref 6–8.5)
RBC # BLD AUTO: 5.65 10*6/MM3 (ref 4.14–5.8)
SARS-COV-2 RNA RESP QL NAA+PROBE: NOT DETECTED
SODIUM SERPL-SCNC: 140 MMOL/L (ref 136–145)
WBC # BLD AUTO: 7.32 10*3/MM3 (ref 3.4–10.8)

## 2020-10-06 PROCEDURE — 83735 ASSAY OF MAGNESIUM: CPT | Performed by: NURSE PRACTITIONER

## 2020-10-06 PROCEDURE — 80053 COMPREHEN METABOLIC PANEL: CPT | Performed by: NURSE PRACTITIONER

## 2020-10-06 PROCEDURE — 93010 ELECTROCARDIOGRAM REPORT: CPT | Performed by: INTERNAL MEDICINE

## 2020-10-06 PROCEDURE — 85027 COMPLETE CBC AUTOMATED: CPT | Performed by: NURSE PRACTITIONER

## 2020-10-06 PROCEDURE — 93005 ELECTROCARDIOGRAM TRACING: CPT | Performed by: NURSE PRACTITIONER

## 2020-10-06 PROCEDURE — 93005 ELECTROCARDIOGRAM TRACING: CPT | Performed by: INTERNAL MEDICINE

## 2020-10-06 RX ORDER — CETIRIZINE HYDROCHLORIDE 10 MG/1
10 TABLET ORAL DAILY
Status: DISCONTINUED | OUTPATIENT
Start: 2020-10-06 | End: 2020-10-08 | Stop reason: HOSPADM

## 2020-10-06 RX ORDER — POTASSIUM CHLORIDE 1.5 G/1.77G
40 POWDER, FOR SOLUTION ORAL AS NEEDED
Status: DISCONTINUED | OUTPATIENT
Start: 2020-10-06 | End: 2020-10-08 | Stop reason: HOSPADM

## 2020-10-06 RX ORDER — NITROGLYCERIN 0.4 MG/1
0.4 TABLET SUBLINGUAL
Status: DISCONTINUED | OUTPATIENT
Start: 2020-10-06 | End: 2020-10-08 | Stop reason: HOSPADM

## 2020-10-06 RX ORDER — SODIUM CHLORIDE 0.9 % (FLUSH) 0.9 %
10 SYRINGE (ML) INJECTION AS NEEDED
Status: DISCONTINUED | OUTPATIENT
Start: 2020-10-06 | End: 2020-10-08 | Stop reason: HOSPADM

## 2020-10-06 RX ORDER — POTASSIUM CHLORIDE 750 MG/1
40 CAPSULE, EXTENDED RELEASE ORAL AS NEEDED
Status: DISCONTINUED | OUTPATIENT
Start: 2020-10-06 | End: 2020-10-08 | Stop reason: HOSPADM

## 2020-10-06 RX ORDER — DOFETILIDE 0.25 MG/1
500 CAPSULE ORAL EVERY 12 HOURS
Status: DISCONTINUED | OUTPATIENT
Start: 2020-10-06 | End: 2020-10-07

## 2020-10-06 RX ORDER — FUROSEMIDE 40 MG/1
40 TABLET ORAL DAILY
Status: DISCONTINUED | OUTPATIENT
Start: 2020-10-06 | End: 2020-10-08 | Stop reason: HOSPADM

## 2020-10-06 RX ORDER — METOPROLOL TARTRATE 50 MG/1
100 TABLET, FILM COATED ORAL 2 TIMES DAILY
Status: DISCONTINUED | OUTPATIENT
Start: 2020-10-06 | End: 2020-10-07

## 2020-10-06 RX ORDER — LISINOPRIL 40 MG/1
40 TABLET ORAL DAILY
Status: DISCONTINUED | OUTPATIENT
Start: 2020-10-06 | End: 2020-10-08 | Stop reason: HOSPADM

## 2020-10-06 RX ORDER — ACETAMINOPHEN 325 MG/1
650 TABLET ORAL EVERY 4 HOURS PRN
Status: DISCONTINUED | OUTPATIENT
Start: 2020-10-06 | End: 2020-10-08 | Stop reason: HOSPADM

## 2020-10-06 RX ORDER — SODIUM CHLORIDE 0.9 % (FLUSH) 0.9 %
10 SYRINGE (ML) INJECTION EVERY 12 HOURS SCHEDULED
Status: DISCONTINUED | OUTPATIENT
Start: 2020-10-06 | End: 2020-10-08 | Stop reason: HOSPADM

## 2020-10-06 RX ADMIN — SODIUM CHLORIDE, PRESERVATIVE FREE 10 ML: 5 INJECTION INTRAVENOUS at 20:20

## 2020-10-06 RX ADMIN — APIXABAN 5 MG: 5 TABLET, FILM COATED ORAL at 20:19

## 2020-10-06 RX ADMIN — DOFETILIDE 500 MCG: 0.25 CAPSULE ORAL at 13:46

## 2020-10-06 RX ADMIN — METOPROLOL TARTRATE 100 MG: 50 TABLET, FILM COATED ORAL at 20:19

## 2020-10-06 NOTE — PROGRESS NOTES
"[facility]: Tikosyn Consult    Pharmacy has been consulted to look over Marcell De Santiago's profile to review renal function and drug interactions with new start tikosyn throughout his length of stay in hospital per STEPHANY Garza's request.    Relevant clinical data and objective history reviewed:  64 y.o. male 188 cm (74\") 104 kg (230 lb)    Past Medical History:   Diagnosis Date   • A-fib (CMS/HCC)    • Atrial fibrillation (CMS/HCC)    • Cellulitis    • Chronic lumbar pain    • Dyslipidemia 6/5/2018   • Essential hypertension 4/11/2016   • History of low potassium      Creatinine   Date Value Ref Range Status   10/06/2020 0.93 0.76 - 1.27 mg/dL Final     BUN   Date Value Ref Range Status   10/06/2020 18 8 - 23 mg/dL Final     Estimated Creatinine Clearance: 105.9 mL/min (by C-G formula based on SCr of 0.93 mg/dL).    Assessment/Plan    1. Patient is started on Tikosyn 500 mg PO every 12 hours this afternoon. This dose is appropriate for patient's current renal function (CrCl >100). This dose may change based on the initial EKG, or the EKG 2-3 hrs after initial dose per physician's judgement.     2. Reviewed patient's home medications as well as current inpatient medications.  Furosemide can enhance QTc prolonging effect of Tikosyn (Major interaction). Will need to monitor serum potassium and magnesium more closely when dofetilide is combined with Furosemide. Electrolyte replacements will likely be required to maintain potassium and magnesium serum concentrations within the normal range prior to, and during, the administration of dofetilide. Subtherapeutic electrolyte concentrations may result in QTc prolongation or torsade de pointes.  Patient has active order for K replacement protocol. K 3.9, Mg 2.1, and baseline QTc 483 today prior to initiation of Tikosyn.     3. Pharmacy will continue to follow daily throughout patient's stay for any changes in medications, renal function, and " electrolytes (magnesium/potassium).     Thank you for allowing me to participate in your patient's care. Please call pharmacy with any questions or concerns.    Ameena Todd, PharmD, BCPS   Clinical Staff Pharmacist

## 2020-10-06 NOTE — ACP (ADVANCE CARE PLANNING)
Patient's wife requested information on Advance Directive.  I provided both a copy of an AD pamphlet and then explained how it worked.  Wife expressed concern for her  and was tearful.  Patient tended to be stoic, only expressing some frustrations for being here.

## 2020-10-06 NOTE — PROGRESS NOTES
Discharge Planning Assessment  [facility]     Patient Name: Marcell De Santiago  MRN: [id number]  Today's Date: 10/6/2020    Admit Date: 10/6/2020    Discharge Needs Assessment     Row Name 10/06/20 1629       Living Environment    Lives With  spouse    Name(s) of Who Lives With Patient  Afsaneh De Santiago, spouse    Current Living Arrangements  home/apartment/condo    Primary Care Provided by  self    Provides Primary Care For  pet(s) 1 dog    Family Caregiver if Needed  spouse    Family Caregiver Names  Afsaneh, spouse    Quality of Family Relationships  helpful;involved;supportive    Able to Return to Prior Arrangements  yes       Resource/Environmental Concerns    Resource/Environmental Concerns  none    Transportation Concerns  car, none       Transition Planning    Patient/Family Anticipates Transition to  home with family    Patient/Family Anticipated Services at Transition  none    Transportation Anticipated  family or friend will provide       Discharge Needs Assessment    Readmission Within the Last 30 Days  no previous admission in last 30 days    Equipment Currently Used at Home  none    Concerns to be Addressed  medication    Concerns Comments  Cost of tikosyn    Anticipated Changes Related to Illness  none    Equipment Needed After Discharge  none    Provided Post Acute Provider List?  N/A    N/A Provider List Comment  no d/c needs identified        Discharge Plan     Row Name 10/06/20 9189       Plan    Plan  10/6 Home;  Call Pt pharmacy prior to d/c to get cost of tikosyn and CONFIRM HE CAN AFFORD.    Plan Comments  CCP spoke with Pt at bedside.  CCP donned a mask and face shield.  CCP spoke with patient at a distance greater than six feet.  CCP introduced self and role.  Pt confirmed information on face sheet.  Pt stated he is IADL'S, retired & drives.  Pt lives with his spouse Afsaneh De Santiago ([phone]).  Pt reports PCP is Alex Wall.  Pt confirms pharmacy as [facility]  [address]  Pt denies issues with affording his medications.  Pt denies past home health, sub-acute rehab & DME.  Pt plans to return home at discharge.  Pt denies known d/c needs at this time.  CCP will continue to follow…DILIP DENNEY/CCP        Continued Care and Services - Admitted Since 10/6/2020    Coordination has not been started for this encounter.         Demographic Summary     Row Name 10/06/20 1628       General Information    Admission Type  inpatient    Arrived From  home    Referral Source  admission list;physician    Reason for Consult  discharge planning;medication concerns    Preferred Language  English     Used During This Interaction  no        Functional Status     Row Name 10/06/20 1628       Functional Status    Usual Activity Tolerance  good    Current Activity Tolerance  good       Functional Status, IADL    Medications  independent    Meal Preparation  independent    Housekeeping  independent    Laundry  independent    Shopping  independent       Mental Status    General Appearance WDL  WDL       Mental Status Summary    Recent Changes in Mental Status/Cognitive Functioning  no changes       Employment/    Employment Status  retired        Psychosocial    No documentation.       Abuse/Neglect    No documentation.       Legal    No documentation.       Substance Abuse    No documentation.       Patient Forms    No documentation.           Doris Mart RN

## 2020-10-07 ENCOUNTER — HOSPITAL ENCOUNTER (OUTPATIENT)
Dept: CARDIOLOGY | Facility: HOSPITAL | Age: 65
End: 2020-10-07

## 2020-10-07 LAB
ANION GAP SERPL CALCULATED.3IONS-SCNC: 8.5 MMOL/L (ref 5–15)
BUN SERPL-MCNC: 20 MG/DL (ref 8–23)
BUN/CREAT SERPL: 20.4 (ref 7–25)
CALCIUM SPEC-SCNC: 8.7 MG/DL (ref 8.6–10.5)
CHLORIDE SERPL-SCNC: 103 MMOL/L (ref 98–107)
CO2 SERPL-SCNC: 26.5 MMOL/L (ref 22–29)
CREAT SERPL-MCNC: 0.98 MG/DL (ref 0.76–1.27)
GFR SERPL CREATININE-BSD FRML MDRD: 78 ML/MIN/1.73
GLUCOSE SERPL-MCNC: 97 MG/DL (ref 65–99)
MAGNESIUM SERPL-MCNC: 2.2 MG/DL (ref 1.6–2.4)
POTASSIUM SERPL-SCNC: 3.7 MMOL/L (ref 3.5–5.2)
SODIUM SERPL-SCNC: 138 MMOL/L (ref 136–145)

## 2020-10-07 PROCEDURE — 93005 ELECTROCARDIOGRAM TRACING: CPT | Performed by: INTERNAL MEDICINE

## 2020-10-07 PROCEDURE — 93010 ELECTROCARDIOGRAM REPORT: CPT | Performed by: INTERNAL MEDICINE

## 2020-10-07 PROCEDURE — 99232 SBSQ HOSP IP/OBS MODERATE 35: CPT | Performed by: INTERNAL MEDICINE

## 2020-10-07 PROCEDURE — 80048 BASIC METABOLIC PNL TOTAL CA: CPT | Performed by: NURSE PRACTITIONER

## 2020-10-07 PROCEDURE — 93005 ELECTROCARDIOGRAM TRACING: CPT | Performed by: NURSE PRACTITIONER

## 2020-10-07 PROCEDURE — 83735 ASSAY OF MAGNESIUM: CPT | Performed by: INTERNAL MEDICINE

## 2020-10-07 RX ORDER — METOPROLOL TARTRATE 50 MG/1
50 TABLET, FILM COATED ORAL 2 TIMES DAILY
Status: DISCONTINUED | OUTPATIENT
Start: 2020-10-07 | End: 2020-10-08 | Stop reason: HOSPADM

## 2020-10-07 RX ORDER — DOFETILIDE 0.25 MG/1
250 CAPSULE ORAL EVERY 12 HOURS
Status: DISCONTINUED | OUTPATIENT
Start: 2020-10-07 | End: 2020-10-08

## 2020-10-07 RX ORDER — DOCUSATE SODIUM 100 MG/1
100 CAPSULE, LIQUID FILLED ORAL 2 TIMES DAILY PRN
Status: DISCONTINUED | OUTPATIENT
Start: 2020-10-07 | End: 2020-10-08 | Stop reason: HOSPADM

## 2020-10-07 RX ADMIN — DOCUSATE SODIUM 100 MG: 100 CAPSULE, LIQUID FILLED ORAL at 21:16

## 2020-10-07 RX ADMIN — CETIRIZINE HYDROCHLORIDE 10 MG: 10 TABLET ORAL at 08:32

## 2020-10-07 RX ADMIN — APIXABAN 5 MG: 5 TABLET, FILM COATED ORAL at 21:16

## 2020-10-07 RX ADMIN — METOPROLOL TARTRATE 50 MG: 50 TABLET, FILM COATED ORAL at 21:16

## 2020-10-07 RX ADMIN — APIXABAN 5 MG: 5 TABLET, FILM COATED ORAL at 08:32

## 2020-10-07 RX ADMIN — LISINOPRIL 40 MG: 40 TABLET ORAL at 08:30

## 2020-10-07 RX ADMIN — SODIUM CHLORIDE, PRESERVATIVE FREE 10 ML: 5 INJECTION INTRAVENOUS at 21:17

## 2020-10-07 RX ADMIN — METOPROLOL TARTRATE 100 MG: 50 TABLET, FILM COATED ORAL at 08:30

## 2020-10-07 RX ADMIN — DOFETILIDE 500 MCG: 0.25 CAPSULE ORAL at 08:30

## 2020-10-07 RX ADMIN — FUROSEMIDE 40 MG: 40 TABLET ORAL at 08:32

## 2020-10-07 RX ADMIN — DOFETILIDE 250 MCG: 0.25 CAPSULE ORAL at 21:17

## 2020-10-07 RX ADMIN — SODIUM CHLORIDE, PRESERVATIVE FREE 10 ML: 5 INJECTION INTRAVENOUS at 08:38

## 2020-10-07 RX ADMIN — DOCUSATE SODIUM 100 MG: 100 CAPSULE, LIQUID FILLED ORAL at 09:16

## 2020-10-07 NOTE — PROGRESS NOTES
LOS: 1 day   Patient Care Team:  Alex Wall MD as PCP - General (Internal Medicine)  Agustin Hinton MD as Surgeon (General Surgery)    Chief Complaint:  Atrial fibrillation    Interval History:   Patient spontaneously converted this morning, after receiving second dose of Tikosyn.  He feels unchanged.  We discussed his care.    Objective   Vital Signs  Temp:  [97.3 °F (36.3 °C)-98.3 °F (36.8 °C)] 97.3 °F (36.3 °C)  Heart Rate:  [] 52  Resp:  [16-18] 16  BP: (114-134)/() 125/82    Intake/Output Summary (Last 24 hours) at 10/7/2020 1622  Last data filed at 10/6/2020 2020  Gross per 24 hour   Intake 340 ml   Output --   Net 340 ml       Review of Systems   Constitution: Negative.   Cardiovascular: Negative.    Respiratory: Negative.    Gastrointestinal: Negative.        Physical Exam  Vitals signs and nursing note reviewed.   Constitutional:       Appearance: Normal appearance.   HENT:      Head: Normocephalic and atraumatic.   Cardiovascular:      Rate and Rhythm: Normal rate.   Neurological:      Mental Status: He is alert.   Psychiatric:         Mood and Affect: Mood normal.         Behavior: Behavior normal.           Results Review:      Results from last 7 days   Lab Units 10/07/20  0338 10/06/20  1137   SODIUM mmol/L 138 140   POTASSIUM mmol/L 3.7 3.9   CHLORIDE mmol/L 103 104   CO2 mmol/L 26.5 25.1   BUN mg/dL 20 18   CREATININE mg/dL 0.98 0.93   GLUCOSE mg/dL 97 92   CALCIUM mg/dL 8.7 8.9         Results from last 7 days   Lab Units 10/06/20  1137   WBC 10*3/mm3 7.32   HEMOGLOBIN g/dL 16.3   HEMATOCRIT % 47.6   PLATELETS 10*3/mm3 246             Results from last 7 days   Lab Units 10/07/20  0338   MAGNESIUM mg/dL 2.2           I reviewed the patient's new clinical results.  I personally viewed and interpreted the patient's EKG/Telemetry data        Medication Review:   apixaban, 5 mg, Oral, Q12H  cetirizine, 10 mg, Oral, Daily  dofetilide, 250 mcg, Oral, Q12H  furosemide, 40 mg, Oral,  Daily  influenza vaccine, 0.5 mL, Intramuscular, Once  lisinopril, 40 mg, Oral, Daily  metoprolol tartrate, 50 mg, Oral, BID  sodium chloride, 10 mL, Intravenous, Q12H             Assessment/Plan       A-fib (CMS/[facility])    His QT interval was noted to be a little prolonged after he converted to sinus rhythm.  We are going to reduce the dose to 250 MCG twice daily.  Going to reduce his beta-blocker, so he is not as bradycardic as well.  Continue to follow closely.    Brennon Brown MD  10/07/20  16:22 EDT

## 2020-10-07 NOTE — PLAN OF CARE
Problem: Adult Inpatient Plan of Care  Goal: Plan of Care Review  Outcome: Ongoing, Progressing  Flowsheets (Taken 10/7/2020 3716)  Progress: improving  Plan of Care Reviewed With: patient  Outcome Summary: Patient still in afib, HR- 80's-100's. SBP- 120's. Diastolic- 100's. RA. No c/o pain. On tikosyn, held 0130 dose due to prolonged QTC protocol. NPO since midnight for possible cardioversion today. WCTM

## 2020-10-08 ENCOUNTER — PATIENT MESSAGE (OUTPATIENT)
Dept: CARDIOLOGY | Facility: CLINIC | Age: 65
End: 2020-10-08

## 2020-10-08 ENCOUNTER — TELEPHONE (OUTPATIENT)
Dept: CARDIOLOGY | Facility: CLINIC | Age: 65
End: 2020-10-08

## 2020-10-08 ENCOUNTER — READMISSION MANAGEMENT (OUTPATIENT)
Dept: CALL CENTER | Facility: HOSPITAL | Age: 65
End: 2020-10-08

## 2020-10-08 VITALS
SYSTOLIC BLOOD PRESSURE: 117 MMHG | DIASTOLIC BLOOD PRESSURE: 77 MMHG | BODY MASS INDEX: 30.29 KG/M2 | OXYGEN SATURATION: 96 % | WEIGHT: 236 LBS | HEART RATE: 54 BPM | TEMPERATURE: 97.3 F | HEIGHT: 74 IN | RESPIRATION RATE: 16 BRPM

## 2020-10-08 LAB
ANION GAP SERPL CALCULATED.3IONS-SCNC: 9.9 MMOL/L (ref 5–15)
BUN SERPL-MCNC: 19 MG/DL (ref 8–23)
BUN/CREAT SERPL: 17.1 (ref 7–25)
CALCIUM SPEC-SCNC: 8.8 MG/DL (ref 8.6–10.5)
CHLORIDE SERPL-SCNC: 102 MMOL/L (ref 98–107)
CO2 SERPL-SCNC: 27.1 MMOL/L (ref 22–29)
CREAT SERPL-MCNC: 1.11 MG/DL (ref 0.76–1.27)
GFR SERPL CREATININE-BSD FRML MDRD: 67 ML/MIN/1.73
GLUCOSE SERPL-MCNC: 104 MG/DL (ref 65–99)
POTASSIUM SERPL-SCNC: 4.2 MMOL/L (ref 3.5–5.2)
SODIUM SERPL-SCNC: 139 MMOL/L (ref 136–145)

## 2020-10-08 PROCEDURE — 93010 ELECTROCARDIOGRAM REPORT: CPT | Performed by: INTERNAL MEDICINE

## 2020-10-08 PROCEDURE — 93005 ELECTROCARDIOGRAM TRACING: CPT | Performed by: NURSE PRACTITIONER

## 2020-10-08 PROCEDURE — 80048 BASIC METABOLIC PNL TOTAL CA: CPT | Performed by: NURSE PRACTITIONER

## 2020-10-08 PROCEDURE — 99238 HOSP IP/OBS DSCHRG MGMT 30/<: CPT | Performed by: NURSE PRACTITIONER

## 2020-10-08 RX ORDER — METOPROLOL TARTRATE 100 MG/1
50 TABLET ORAL 2 TIMES DAILY
Qty: 60 TABLET | Refills: 11
Start: 2020-10-08 | End: 2021-02-23 | Stop reason: SDUPTHER

## 2020-10-08 RX ADMIN — APIXABAN 5 MG: 5 TABLET, FILM COATED ORAL at 08:25

## 2020-10-08 RX ADMIN — FUROSEMIDE 40 MG: 40 TABLET ORAL at 08:26

## 2020-10-08 RX ADMIN — METOPROLOL TARTRATE 50 MG: 50 TABLET, FILM COATED ORAL at 08:56

## 2020-10-08 RX ADMIN — LISINOPRIL 40 MG: 40 TABLET ORAL at 08:26

## 2020-10-08 RX ADMIN — SODIUM CHLORIDE, PRESERVATIVE FREE 10 ML: 5 INJECTION INTRAVENOUS at 08:57

## 2020-10-08 RX ADMIN — CETIRIZINE HYDROCHLORIDE 10 MG: 10 TABLET ORAL at 08:26

## 2020-10-08 NOTE — PAYOR COMM NOTE
"Mark De Santiago (64 y.o. Male)                        ATTENTION;   MIGUEL ANGEL CHERRY, CONTINUED STAY CLINICAL FOR REVIEW, AUTH# [id number]                     REPLY TO UR DEPT,  [phone] OR CALL          Date of Birth Social Security Number Address Home Phone MRN    [id number]  [id number] [facility] [phone] [id number]    Sikhism Marital Status          None        Admission Date Admission Type Admitting Provider Attending Provider Department, Room/Bed    10/6/20 Elective Brennon Brown MD Parrott, Kevin Wayne, MD [facility] CARDIOVASC UNIT, 2221/1    Discharge Date Discharge Disposition Discharge Destination                       Attending Provider: Brennon Brown MD    Allergies: No Known Allergies    Isolation: None   Infection: None   Code Status: CPR    Ht: 188 cm (74\")   Wt: 107 kg (236 lb)    Admission Cmt: None   Principal Problem: None                Active Insurance as of 10/6/2020     Primary Coverage     Payor Plan Insurance Group Employer/Plan Group    [employer] AET[employer] [id number]     Payor Plan Address Payor Plan Phone Number Payor Plan Fax Number Effective Dates    [address] [phone]  1/1/2019 - None Entered    [facility] [id number]       Subscriber Name Subscriber Birth Date Member ID       MARK DE SANTIAGO 1955 [id number]                 Emergency Contacts      (Rel.) Home Phone Work Phone Mobile Phone    Afsaneh De Santiago (Spouse) [phone]            Vital Signs (last day)     Date/Time   Temp   Temp src   Pulse   Resp   BP   Patient Position   SpO2    10/08/20 0705   97.1 (36.2)   Temporal   60   16   122/77   Lying   96    10/08/20 0308   97.8 (36.6)   Temporal   (!) 47   16   111/63   Lying   96    10/07/20 2116   --   --   58   --   111/74   --   --    10/07/20 1907   97.7 (36.5)   Oral   52   --   109/70   Sitting   95    10/07/20 1510   97.3 (36.3)   " Temporal   52   16   125/82   Lying   95    10/07/20 1120   --   Temporal   (!) 44   16   114/80   Sitting   96    10/07/20 1118   98.1 (36.7)   Temporal   (!) 46   16   114/80   Sitting   97    10/07/20 0714   97.5 (36.4)   Temporal   95   18   (!) 134/115   Lying   96    10/07/20 0357   98.3 (36.8)   Oral   82   16   (!) 121/107   Lying   96              Oxygen Therapy (last day)     Date/Time   SpO2   Device (Oxygen Therapy)   Flow (L/min)   Oxygen Concentration (%)   ETCO2 (mmHg)    10/08/20 0705   96   --   --   --   --    10/08/20 0430   --   room air   --   --   --    10/08/20 0308   96   --   --   --   --    10/07/20 2040   --   room air   --   --   --    10/07/20 1907   95   --   --   --   --    10/07/20 1510   95   room air   --   --   --    10/07/20 1200   --   room air   --   --   --    10/07/20 1120   96   room air   --   --   --    10/07/20 1118   97   room air   --   --   --    10/07/20 0740   --   room air   --   --   --    10/07/20 0714   96   room air   --   --   --    10/07/20 0410   --   room air   --   --   --    10/07/20 0357   96   room air   --   --   --                Orders (last 24 hrs)      Start     Ordered    10/08/20 0900  ECG 12 Lead  Once      10/08/20 0823    10/07/20 2330  ECG 12 Lead  Once      10/07/20 2249    10/07/20 2100  metoprolol tartrate (LOPRESSOR) tablet 50 mg  2 Times Daily      10/07/20 1305    10/07/20 2100  dofetilide (TIKOSYN) capsule 250 mcg  Every 12 Hours,   Status:  Discontinued      10/07/20 1305    10/07/20 1327  Diet Regular; Cardiac  Diet Effective Now      10/07/20 1326    10/07/20 1200  influenza vac split quad (FLUZONE,FLUARIX,AFLURIA,FLULAVAL) injection 0.5 mL  Once      10/06/20 1201    10/07/20 1120  ECG 12 Lead  STAT      10/07/20 1120    10/07/20 0851  docusate sodium (COLACE) capsule 100 mg  2 Times Daily PRN      10/07/20 0852    10/07/20 0600  Basic Metabolic Panel  Daily      10/06/20 1113    10/07/20 0600  ECG 12 Lead  Daily,   Status:  Canceled       10/06/20 1244    10/07/20 0600  Basic Metabolic Panel  Daily      10/06/20 1244    10/07/20 0600  Potassium  Daily      10/06/20 1244    10/06/20 1330  dofetilide (TIKOSYN) capsule 500 mcg  Every 12 Hours,   Status:  Discontinued      10/06/20 1244    10/06/20 1244  potassium chloride (MICRO-K) CR capsule 40 mEq  As Needed      10/06/20 1244    10/06/20 1244  potassium chloride (KLOR-CON) packet 40 mEq  As Needed      10/06/20 1244    10/06/20 1244  Pharmacy Consult  Once As Needed      10/06/20 1244    10/06/20 1200  apixaban (ELIQUIS) tablet 5 mg  Every 12 Hours Scheduled      10/06/20 1113    10/06/20 1200  cetirizine (zyrTEC) tablet 10 mg  Daily      10/06/20 1113    10/06/20 1200  furosemide (LASIX) tablet 40 mg  Daily      10/06/20 1113    10/06/20 1200  lisinopril (PRINIVIL,ZESTRIL) tablet 40 mg  Daily      10/06/20 1113    10/06/20 1200  metoprolol tartrate (LOPRESSOR) tablet 100 mg  2 Times Daily,   Status:  Discontinued      10/06/20 1113    10/06/20 1200  Vital Signs  Every 4 Hours      10/06/20 1113    10/06/20 1200  sodium chloride 0.9 % flush 10 mL  Every 12 Hours Scheduled      10/06/20 1113    10/06/20 1113  acetaminophen (TYLENOL) tablet 650 mg  Every 4 Hours PRN      10/06/20 1113    10/06/20 1112  nitroglycerin (NITROSTAT) SL tablet 0.4 mg  Every 5 Minutes PRN      10/06/20 1113    10/06/20 1112  sodium chloride 0.9 % flush 10 mL  As Needed      10/06/20 1113    Unscheduled  Oxygen Therapy- Nasal Cannula; Titrate for SPO2: 90% - 95%  Continuous PRN     Comments: If Patient Develops Unresponsiveness, Acute Dyspnea, Cyanosis or Suspected Hypoxemia Start Continuous Pulse Ox Monitoring, Apply Oxygen & Notify Provider    10/06/20 1113    Unscheduled  ECG 12 Lead  As Needed     Comments: Nurse to Release if Patient Expericences Acute Chest Pain or Dysrhythmias    10/06/20 1113    Unscheduled  Potassium  As Needed     Comments: For Ventricular Arrhythmias      10/06/20 1113    Unscheduled  Magnesium   As Needed     Comments: For Ventricular Arrhythmias      10/06/20 1113    Unscheduled  Troponin  As Needed     Comments: For Chest Pain      10/06/20 1113    Unscheduled  Digoxin Level  As Needed     Comments: For Atrial Arrhythmias      10/06/20 1113    Unscheduled  Blood Gas, Arterial  As Needed     Comments: Per O2 PolicyNotify Physician      10/06/20 1113    Unscheduled  ECG 12 Lead  As Needed     Comments: Nurse to Release 3 Hours After First Dose and Any Dose Change - Measure QT/QTc    10/06/20 1244    Unscheduled  ECG 12 Lead  As Needed     Comments: Nurse to Release As Needed to Verify QTc Less Than 500 msec    10/06/20 1244                    Corrina Echeverria, RN   Registered Nurse      Plan of Care   Signed   Date of Service:  10/07/20 0505   Creation Time:  10/07/20 0505            Signed             Show:Clear all  []Manual[x]Template[]Copied    Added by:  [x]Corrina Echeverria, RN    []Hover for details     Problem: Adult Inpatient Plan of Care  Goal: Plan of Care Review  Outcome: Ongoing, Progressing  Flowsheets (Taken 10/7/2020 0503)  Progress: improving  Plan of Care Reviewed With: patient  Outcome Summary: Patient still in afib, HR- 80's-100's. SBP- 120's. Diastolic- 100's. RA. No c/o pain. On tikosyn, held 0130 dose due to prolonged QTC protocol. NPO since midnight for possible cardioversion today. [facility]                                 Physician Progress Notes       Brennon Brown MD at 10/07/20 1622             LOS: 1 day   Patient Care Team:  Alex Wall MD as PCP - General (Internal Medicine)  Agustin Hinton MD as Surgeon (General Surgery)    Chief Complaint:  Atrial fibrillation    Interval History:   Patient spontaneously converted this morning, after receiving second dose of Tikosyn.  He feels unchanged.  We discussed his care.    Objective   Vital Signs  Temp:  [97.3 °F (36.3 °C)-98.3 °F (36.8 °C)] 97.3 °F (36.3 °C)  Heart Rate:  [] 52  Resp:  [16-18] 16  BP:  (114-134)/() 125/82    Intake/Output Summary (Last 24 hours) at 10/7/2020 1622  Last data filed at 10/6/2020 2020  Gross per 24 hour   Intake 340 ml   Output --   Net 340 ml       Review of Systems   Constitution: Negative.   Cardiovascular: Negative.    Respiratory: Negative.    Gastrointestinal: Negative.        Physical Exam  Vitals signs and nursing note reviewed.   Constitutional:       Appearance: Normal appearance.   HENT:      Head: Normocephalic and atraumatic.   Cardiovascular:      Rate and Rhythm: Normal rate.   Neurological:      Mental Status: He is alert.   Psychiatric:         Mood and Affect: Mood normal.         Behavior: Behavior normal.           Results Review:      Results from last 7 days   Lab Units 10/07/20  0338 10/06/20  1137   SODIUM mmol/L 138 140   POTASSIUM mmol/L 3.7 3.9   CHLORIDE mmol/L 103 104   CO2 mmol/L 26.5 25.1   BUN mg/dL 20 18   CREATININE mg/dL 0.98 0.93   GLUCOSE mg/dL 97 92   CALCIUM mg/dL 8.7 8.9         Results from last 7 days   Lab Units 10/06/20  1137   WBC 10*3/mm3 7.32   HEMOGLOBIN g/dL 16.3   HEMATOCRIT % 47.6   PLATELETS 10*3/mm3 246             Results from last 7 days   Lab Units 10/07/20  0338   MAGNESIUM mg/dL 2.2           I reviewed the patient's new clinical results.  I personally viewed and interpreted the patient's EKG/Telemetry data        Medication Review:   apixaban, 5 mg, Oral, Q12H  cetirizine, 10 mg, Oral, Daily  dofetilide, 250 mcg, Oral, Q12H  furosemide, 40 mg, Oral, Daily  influenza vaccine, 0.5 mL, Intramuscular, Once  lisinopril, 40 mg, Oral, Daily  metoprolol tartrate, 50 mg, Oral, BID  sodium chloride, 10 mL, Intravenous, Q12H             Assessment/Plan       A-fib (CMS/[facility])    His QT interval was noted to be a little prolonged after he converted to sinus rhythm.  We are going to reduce the dose to 250 MCG twice daily.  Going to reduce his beta-blocker, so he is not as bradycardic as well.  Continue to follow closely.    Brennon CASTILLO  MD Kevin  10/07/20  16:22 EDT    Electronically signed by Brennon Brown MD at 10/07/20 1623       Kaley Hsu, RN   Registered Nurse      Nursing Note   Signed   Date of Service:  10/07/20 2103   Creation Time:  10/07/20 2103            Signed             Show:Clear all  [x]Manual[]Template[]Copied    Added by:  [x]Kaley Hsu, RN    []Chema for details  cardoiversion canceled per dr. Hernández as patient is sinus bradycardia after second dose of tikosyn third dose adjusted and rescheduled for 2100, as well as metoprolol decreased from 100 to 50bid, no parameters noted for administration, next shift aware and to monitor, qtc 527, bifocal pvc noted and pause longest is 2.2, pauses around 1.8 one every hour, mello aware, no s/s of unstable hemodynamics noted, patient knows to move slow and have assistance when ambulating with bradycardia, no acute distress noted, jair hose applied, next shift aware and to monitor. Education provided to patient and to wife at bedside. Patient complains of constipation, colace given prn, next dose tonight, dietary called for prune juice, monitor tech also attempted to contact dietary, next shift aware and to monitor.                Corrina Echeverria, RN   Registered Nurse      Plan of Care   Signed   Date of Service:  10/08/20 0521   Creation Time:  10/08/20 0521            Signed             Show:Clear all  []Manual[x]Template[]Copied    Added by:  [x]Corrina Echeverria RN    []Chema for details     Problem: Adult Inpatient Plan of Care  Goal: Plan of Care Review  Outcome: Ongoing, Progressing  Flowsheets  Taken 10/8/2020 0519  Progress: improving  Outcome Summary: Patient in SB- 40's-60's. Lowest rate was 41. Few pauses, longest was 2.3sec. Frequent PVC's. Had a short burst of afib at 0348 and 0410. RA. SBP- 120's. No c/o pain. Tikosyn given last night. Metoprolol given per MD and Vaishali Eubanks aware. [facility]                for Marcell De Santiago as of 10/6/20 through  10/8/20    1 Day 3 Days 7 Days 10 Days <  Today >     Legend:                          Inactive     Active     Other Encounter     Linked                 Medications 10/06/20 10/07/20 10/08/20   apixaban (ELIQUIS) tablet 5 mg   Dose: 5 mg  Freq: Every 12 Hours Scheduled Route: PO  Indications of Use: ATRIAL FIBRILLATION  Start: 10/06/20 1200    Admin Instructions:   Tablet may be crushed and suspended in 60 mL of water or D5W and immediately delivered via NG tube.    (1142) [C]   2019 0832 2116 0825 2100          cetirizine (zyrTEC) tablet 10 mg   Dose: 10 mg  Freq: Daily Route: PO  Start: 10/06/20 1200    (1142) [C]          0832          0826            dofetilide (TIKOSYN) capsule 250 mcg   Dose: 250 mcg  Freq: Every 12 Hours Route: PO  Start: 10/07/20 2100 End: 10/08/20 0820    Admin Instructions:   List of major interactions (Not all inclusive) - itraconazole, ketaconazole (systemic), megestrol, prochlorperazine, propafenone, saquiniavir, HCTZ, trimethoprim, verapamil    Order specific questions:   Is this a new med? (if yes, please use tikosyn order set) Yes     2117 0820-D/C'd          dofetilide (TIKOSYN) capsule 500 mcg   Dose: 500 mcg  Freq: Every 12 Hours Route: PO  Start: 10/06/20 1330 End: 10/07/20 1305    Admin Instructions:   List of major interactions (Not all inclusive) - itraconazole, ketaconazole (systemic), megestrol, prochlorperazine, propafenone, saquiniavir, HCTZ, trimethoprim, verapamil    Order specific questions:   Is this a new med? (if yes, please use tikosyn order set) Yes    1346          (7441) [C]   0830   1305-D/C'd       furosemide (LASIX) tablet 40 mg   Dose: 40 mg  Freq: Daily Route: PO  Start: 10/06/20 1200    (1142) [C]          0832          0826            influenza vac split quad (FLUZONE,FLUARIX,AFLURIA,FLULAVAL) injection 0.5 mL   Dose: 0.5 mL  Freq: Once Route: IM  Start: 10/07/20 1200    Admin Instructions:   **Do Not Administer if Temperature  Greater Than 102F & Notify Pharmacy** Pneumococcal & Influenza Vaccines May Be Given at the Same Time in SEPARATE Injections.  Do not administer if temperature greater than 102F & notify pharmacy. Pneumococcal and influenza vaccines may be given at the same time in SEPARATE injections.      1130            lisinopril (PRINIVIL,ZESTRIL) tablet 40 mg   Dose: 40 mg  Freq: Daily Route: PO  Start: 10/06/20 1200    (1143) [C]          0830 0826            metoprolol tartrate (LOPRESSOR) tablet 100 mg   Dose: 100 mg  Freq: 2 Times Daily Route: PO  Start: 10/06/20 1200 End: 10/07/20 1305    (1143) [C]   2019 0830   1305-D/C'd         metoprolol tartrate (LOPRESSOR) tablet 50 mg   Dose: 50 mg  Freq: 2 Times Daily Route: PO  Start: 10/07/20 2100     2116          0900   2100          sodium chloride 0.9 % flush 10 mL   Dose: 10 mL  Freq: Every 12 Hours Scheduled Route: IV  Start: 10/06/20 1200    (1144)   2020 0838 2117 0900 2100          Medications 10/06/20 10/07/20 10/08/20       Continuous Meds Sorted by Name  for Anyi Marcell as of 10/6/20 through 10/8/20   Legend:                          Inactive     Active     Other Encounter     Linked                 Medications 10/06/20 10/07/20 10/08/20       PRN Meds Sorted by Name  for Anyi, Marcell as of 10/6/20 through 10/8/20   Legend:                          Inactive     Active     Other Encounter     Linked                 Medications 10/06/20 10/07/20 10/08/20   acetaminophen (TYLENOL) tablet 650 mg   Dose: 650 mg  Freq: Every 4 Hours PRN Route: PO  PRN Reason: Mild Pain   Start: 10/06/20 1113    Admin Instructions:   Do not exceed 4 grams of acetaminophen in a 24 hr period.    If given for pain, use the following pain scale:   Mild Pain = Pain Score of 1-3, CPOT 1-2  Moderate Pain = Pain Score of 4-6, CPOT 3-4  Severe Pain = Pain Score of 7-10, CPOT 5-8         docusate sodium (COLACE) capsule 100 mg   Dose: 100 mg  Freq: 2 Times  Daily PRN Route: PO  PRN Reason: Constipation  Start: 10/07/20 0851    Admin Instructions:   Swallow whole. Do not open, crush, or chew capsule.     0916   5956           nitroglycerin (NITROSTAT) SL tablet 0.4 mg   Dose: 0.4 mg  Freq: Every 5 Minutes PRN Route: SL  PRN Reason: Chest Pain  PRN Comment: Only if SBP Greater Than 100  Start: 10/06/20 1112    Admin Instructions:   If Pain Unrelieved After 3 Doses Notify MD         Pharmacy Consult   Freq: Once As Needed Route: XX  PRN Reason: Consult  Start: 10/06/20 1244    Admin Instructions:   List of major interactions (Not all inclusive) - itraconazole, ketaconazole (systemic), megestrol, prochlorperazine, propafenone, saquiniavir, HCTZ, trimethoprim, verapamil    Order specific questions:   Consult for Tikosyn - Pharmacy to Review Drug Interactions & Creatinine Clearance throughout length of stay (CRCL < 20 ml/min is contraindicated)         potassium chloride (MICRO-K) CR capsule 40 mEq   Dose: 40 mEq  Freq: As Needed Route: PO  PRN Comment: potassium replacement.  see admin instructions  Start: 10/06/20 1241    Admin Instructions:   Potassium replacement    Oral (may give tablet or powder packet)  If K+ less than or equal to 3.1 give KCl 40 mEq q4h x 3 doses  If K+ 3.2-3.6 give KCl 40 mEq q4h x 2 doses    Peripheral IV  If K+ less than or equal to 3.1 give KCl 10 mEq/100 mL NS IV q1h x 6 doses  If K+ 3.2-3.6 give KCl 10 mEq/100 mL NS q1h x 4 doses    Central Line  If K+ less than or equal to 3.1 give KCl 20 mEq/50 mL NS IV q1h x 3 doses  If K+ 3.2-3.6 give KCl 20 mEq/50 mL NS q1h x 2 doses    Check potassium 4 hours after last dose given.  Check magnesium if K stays low after replacement.  DO NOT GIVE if CrCl is less than 30 mL/minute or urine output is less than 30 mL/hr         Or  potassium chloride (KLOR-CON) packet 40 mEq   Dose: 40 mEq  Freq: As Needed Route: PO  PRN Comment: per protocol  Start: 10/06/20 1245    Admin Instructions:   Potassium  replacement    Oral (may give tablet or powder packet)  If K+ less than or equal to 3.1 give KCl 40 mEq q4h x 3 doses  If K+ 3.2-3.6 give KCl 40 mEq q4h x 2 doses    Check potassium 4 hours after last dose given.  Check magnesium if K stays low after replacement.  DO NOT GIVE if CrCl is less than 30 mL/minute or urine output is less than 30 mL/hr         sodium chloride 0.9 % flush 10 mL   Dose: 10 mL  Freq: As Needed Route: IV  PRN Reason: Line Care  Start: 10/06/20 1112         Medications 10/06/20 10/07/20 10/08/20

## 2020-10-08 NOTE — TELEPHONE ENCOUNTER
----- Message from Marcell Terryafson sent at 10/7/2020  5:07 PM EDT -----  Regarding: Test Results Question  Contact: [phone]  Thanks Doc,  I know you were pretty busy today .  I was glad the drug worked and kept me from getting Cardio procedure. We'll have questions for you when we see u .  Have a good evening

## 2020-10-08 NOTE — DISCHARGE SUMMARY
DISCHARGE NOTE    Patient Name: Marcell De Santiago  Age/Sex: 64 y.o. male  : 1955  MRN: [id number]    Date of Discharge:  10/8/2020   Date of Admit: 10/6/2020  Encounter Provider: STEPHANY Garcia  Place of Service: [facility] CARD[facility]  Patient Care Team:  Alex Wall MD as PCP - General (Internal Medicine)  Agustin Hinton MD as Surgeon (General Surgery)    Subjective:     Discharge Diagnosis:    A-fib (CMS/HCC)      Hospital Course:     64 yr old male patient of Dr. Pradhan and Dr. Brown with symptomatic persistent afib.  Noted for initiation of dofetilide.  We started him on 500 mcg twice daily, he received 2 doses of 500 mcg, his QTC was a little prolonged, we decreased him to 250 mcg which she received last night, his QTC is still on the longer side so we do not think that this drug is can be a safe drug for him to take.  He did spontaneously convert to SB/SR after the second dose and has maintained sinus rhythm.  We also decreased his metoprolol due to he is bradycardic.  We are going to send him home on anticoagulation and metoprolol and have discussed that the next step will be ablation for treatment of his A. fib.  He did have a cardioversion in the past and did maintain sinus rhythm for several months so he may stay in sinus rhythm for a while, he remains to be seen back can go ahead and schedule for ablation in the next weeks to months.  We will check a couple of EKGs today to make sure that his QTC has improved and as long as it looks okay then he will be able to go home this afternoon.    Vital Signs  Temp:  [97.1 °F (36.2 °C)-97.8 °F (36.6 °C)] 97.3 °F (36.3 °C)  Heart Rate:  [47-60] 54  Resp:  [16] 16  BP: (109-125)/(63-82) 117/77    Intake/Output Summary (Last 24 hours) at 10/8/2020 1353  Last data filed at 10/8/2020 1222  Gross per 24 hour   Intake  770 ml   Output --   Net 770 ml       Physical Exam:    General Appearance: No acute distress, well developed and well nourished.   Eyes: Conjunctiva and lids: No erythema, swelling, or discharge. Sclera non-icteric.   HENT: Atraumatic, normocephalic. External eyes, ears, and nose normal.   Respiratory: No signs of respiratory distress. Respiration rhythm and depth normal.   Clear to auscultation. No rales, crackles, rhonchi, or wheezing auscultated.   Cardiovascular:  Jugular Venous Pressure: Normal  Heart Rate and Rhythm: Normal, Heart Sounds: Normal S1 and S2. No S3 or S4 noted.  Murmurs: No murmurs noted. No rubs, thrills, or gallops.   Arterial Pulses: Posterior tibialis and dorsalis pedis pulses normal.   Lower Extremities: No edema noted.  Gastrointestinal:  Abdomen soft, non-distended, non-tender.  Musculoskeletal: Normal movement of extremities  Skin: Warm and dry.   Psychiatric: Patient alert and oriented to person, place, and time. Speech and behavior appropriate. Normal mood and affect.    Labs:   Results from last 7 days   Lab Units 10/08/20  0337 10/07/20  0338 10/06/20  1137   SODIUM mmol/L 139 138 140   POTASSIUM mmol/L 4.2 3.7 3.9   CHLORIDE mmol/L 102 103 104   CO2 mmol/L 27.1 26.5 25.1   BUN mg/dL 19 20 18   CREATININE mg/dL 1.11 0.98 0.93   GLUCOSE mg/dL 104* 97 92   CALCIUM mg/dL 8.8 8.7 8.9   AST (SGOT) U/L  --   --  25   ALT (SGPT) U/L  --   --  33         Results from last 7 days   Lab Units 10/06/20  1137   WBC 10*3/mm3 7.32   HEMOGLOBIN g/dL 16.3   HEMATOCRIT % 47.6   PLATELETS 10*3/mm3 246         Results from last 7 days   Lab Units 10/07/20  0338 10/06/20  1137   MAGNESIUM mg/dL 2.2 2.1       Discharge Diet:    Dietary Orders (From admission, onward)     Start     Ordered    10/07/20 1327  Diet Regular; Cardiac  Diet Effective Now     Question Answer Comment   Diet Texture / Consistency Regular    Common Modifiers Cardiac        10/07/20 1326                Activity at Discharge:  As  tolerated    Discharge Medications     Discharge Medications      Changes to Medications      Instructions Start Date   metoprolol tartrate 100 MG tablet  Commonly known as: LOPRESSOR  What changed: how much to take   50 mg, Oral, 2 Times Daily         Continue These Medications      Instructions Start Date   apixaban 5 MG tablet tablet  Commonly known as: ELIQUIS   5 mg, Oral, Every 12 Hours Scheduled      furosemide 40 MG tablet  Commonly known as: LASIX   40 mg, Oral, Daily      lisinopril 40 MG tablet  Commonly known as: PRINIVIL,ZESTRIL   40 mg, Oral, Daily      MULTIPLE VITAMIN PO   Oral, Daily      ZYRTEC PO   Oral, Daily             Discharge disposition: home    Follow-up Appointments  Follow-up Information     Alex Wall MD .    Specialty: Internal Medicine  Contact information:  [address] [facility] [id number] [phone]             Brennon Brown MD Follow up in 6 week(s).    Specialties: Cardiology, Cardiac Electrophysiology  Contact information:  [address]  [facility] [address]  [facility] [id number] [phone]                 Future Appointments   Date Time Provider Department Center   11/19/2020  9:45 AM Brennon Brown MD [facility] None   1/11/2021 12:00 PM Florencio Pradhan MD [facility] None   2/11/2021  8:45 AM Alex Wall MD [facility] None         STEPHANY Garcia  10/08/20  13:53 EDT

## 2020-10-08 NOTE — PLAN OF CARE
Problem: Adult Inpatient Plan of Care  Goal: Plan of Care Review  Outcome: Ongoing, Progressing  Flowsheets  Taken 10/8/2020 0519  Progress: improving  Outcome Summary: Patient in SB- 40's-60's. Lowest rate was 41. Few pauses, longest was 2.3sec. Frequent PVC's. Had a short burst of afib at 0348 and 0410. RA. SBP- 120's. No c/o pain. Tikosyn given last night. Metoprolol given per MD and Vaishali Eubanks aware. WCTM  Taken 10/7/2020 0503  Plan of Care Reviewed With: patient

## 2020-10-08 NOTE — NURSING NOTE
cardoiversion canceled per dr. Hernández as patient is sinus bradycardia after second dose of tikosyn third dose adjusted and rescheduled for 2100, as well as metoprolol decreased from 100 to 50bid, no parameters noted for administration, next shift aware and to monitor, qtc 527, bifocal pvc noted and pause longest is 2.2, pauses around 1.8 one every hour, mello aware, no s/s of unstable hemodynamics noted, patient knows to move slow and have assistance when ambulating with bradycardia, no acute distress noted, jair hose applied, next shift aware and to monitor. Education provided to patient and to wife at bedside. Patient complains of constipation, colace given prn, next dose tonight, dietary called for prune juice, monitor tech also attempted to contact dietary, next shift aware and to monitor.

## 2020-10-09 ENCOUNTER — TELEPHONE (OUTPATIENT)
Dept: CARDIOLOGY | Facility: CLINIC | Age: 65
End: 2020-10-09

## 2020-10-09 ENCOUNTER — TRANSITIONAL CARE MANAGEMENT TELEPHONE ENCOUNTER (OUTPATIENT)
Dept: CALL CENTER | Facility: HOSPITAL | Age: 65
End: 2020-10-09

## 2020-10-09 NOTE — PAYOR COMM NOTE
"Mark De Santiago (64 y.o. Male)                         ATTENTION;   DC SUMMARY CASE REF # [id number]        Date of Birth Social Security Number Address Home Phone MRN    1955  [address] [facility] [address] [phone] [id number]    [employer] Marital Status          None        Admission Date Admission Type Admitting Provider Attending Provider Department, Room/Bed    10/6/20 Elective Brennon Brown MD  [facility] CARDIOVASC UNIT, 2221/1    Discharge Date Discharge Disposition Discharge Destination        10/8/2020 Home or Self Care              Attending Provider: (none)   Allergies: No Known Allergies    Isolation: None   Infection: None   Code Status: Prior    Ht: 188 cm (74\")   Wt: 107 kg (236 lb)    Admission Cmt: None   Principal Problem: None                Active Insurance as of 10/6/2020     Primary Coverage     Payor Plan Insurance Group Employer/Plan Group    AETNA COMMERCIAL AETNA [id number]     Payor Plan Address Payor Plan Phone Number Payor Plan Fax Number Effective Dates    [address] [phone]  1/1/2019 - None Entered    [facility] [address]       Subscriber Name Subscriber Birth Date Member ID       MARK DE SANTIAGO 1955 [id number]                 Emergency Contacts      (Rel.) Home Phone Work Phone Mobile Phone    Afsaneh De Santiago (Spouse) [phone]               Discharge Summary      Madeline Rojas APRN at 10/08/20 0850     Attestation signed by Brennon Brown MD at 10/09/20 1438    Patient admitted for Tikosyn initiation.  Unfortunately his QT interval is prolonged, and I do not feel that this will be long-term safe medication for him.  We are going to allow him to go home, and see if the A. fib recurs, and try to understand just how symptomatic he is.  He has been somewhat variable in his description of symptoms with atrial fibrillation, sometimes he denies any symptoms, other " times he endorses fatigue.  If he does have symptomatic atrial fibrillation, will consider him for ablation.                                                                                                                        DISCHARGE NOTE    Patient Name: Marcell De Santiago  Age/Sex: 64 y.o. male  : 1955  MRN: [id number]    Date of Discharge:  10/8/2020   Date of Admit: 10/6/2020  Encounter Provider: STEPHANY Garcia  Place of Service: [facility] CARDIOLOGY  Patient Care Team:  Alex Wall MD as PCP - General (Internal Medicine)  Agustin Hinton MD as Surgeon (General Surgery)    Subjective:     Discharge Diagnosis:    A-fib (CMS/HCC)      Hospital Course:     64 yr old male patient of Dr. Pradhan and Dr. Brown with symptomatic persistent afib.  Noted for initiation of dofetilide.  We started him on 500 mcg twice daily, he received 2 doses of 500 mcg, his QTC was a little prolonged, we decreased him to 250 mcg which she received last night, his QTC is still on the longer side so we do not think that this drug is can be a safe drug for him to take.  He did spontaneously convert to SB/SR after the second dose and has maintained sinus rhythm.  We also decreased his metoprolol due to he is bradycardic.  We are going to send him home on anticoagulation and metoprolol and have discussed that the next step will be ablation for treatment of his A. fib.  He did have a cardioversion in the past and did maintain sinus rhythm for several months so he may stay in sinus rhythm for a while, he remains to be seen back can go ahead and schedule for ablation in the next weeks to months.  We will check a couple of EKGs today to make sure that his QTC has improved and as long as it looks okay then he will be able to go home this afternoon.    Vital Signs  Temp:  [97.1 °F (36.2 °C)-97.8 °F (36.6 °C)] 97.3 °F (36.3 °C)  Heart Rate:  [47-60] 54  Resp:  [16] 16  BP: (109-125)/(63-82)  117/77    Intake/Output Summary (Last 24 hours) at 10/8/2020 1353  Last data filed at 10/8/2020 1222  Gross per 24 hour   Intake 770 ml   Output --   Net 770 ml       Physical Exam:    General Appearance: No acute distress, well developed and well nourished.   Eyes: Conjunctiva and lids: No erythema, swelling, or discharge. Sclera non-icteric.   HENT: Atraumatic, normocephalic. External eyes, ears, and nose normal.   Respiratory: No signs of respiratory distress. Respiration rhythm and depth normal.   Clear to auscultation. No rales, crackles, rhonchi, or wheezing auscultated.   Cardiovascular:  Jugular Venous Pressure: Normal  Heart Rate and Rhythm: Normal, Heart Sounds: Normal S1 and S2. No S3 or S4 noted.  Murmurs: No murmurs noted. No rubs, thrills, or gallops.   Arterial Pulses: Posterior tibialis and dorsalis pedis pulses normal.   Lower Extremities: No edema noted.  Gastrointestinal:  Abdomen soft, non-distended, non-tender.  Musculoskeletal: Normal movement of extremities  Skin: Warm and dry.   Psychiatric: Patient alert and oriented to person, place, and time. Speech and behavior appropriate. Normal mood and affect.    Labs:   Results from last 7 days   Lab Units 10/08/20  0337 10/07/20  0338 10/06/20  1137   SODIUM mmol/L 139 138 140   POTASSIUM mmol/L 4.2 3.7 3.9   CHLORIDE mmol/L 102 103 104   CO2 mmol/L 27.1 26.5 25.1   BUN mg/dL 19 20 18   CREATININE mg/dL 1.11 0.98 0.93   GLUCOSE mg/dL 104* 97 92   CALCIUM mg/dL 8.8 8.7 8.9   AST (SGOT) U/L  --   --  25   ALT (SGPT) U/L  --   --  33         Results from last 7 days   Lab Units 10/06/20  1137   WBC 10*3/mm3 7.32   HEMOGLOBIN g/dL 16.3   HEMATOCRIT % 47.6   PLATELETS 10*3/mm3 246         Results from last 7 days   Lab Units 10/07/20  0338 10/06/20  1137   MAGNESIUM mg/dL 2.2 2.1       Discharge Diet:    Dietary Orders (From admission, onward)     Start     Ordered    10/07/20 1327  Diet Regular; Cardiac  Diet Effective Now     Question Answer Comment    Diet Texture / Consistency Regular    Common Modifiers Cardiac        10/07/20 1326                Activity at Discharge:  As tolerated    Discharge Medications     Discharge Medications      Changes to Medications      Instructions Start Date   metoprolol tartrate 100 MG tablet  Commonly known as: LOPRESSOR  What changed: how much to take   50 mg, Oral, 2 Times Daily         Continue These Medications      Instructions Start Date   apixaban 5 MG tablet tablet  Commonly known as: ELIQUIS   5 mg, Oral, Every 12 Hours Scheduled      furosemide 40 MG tablet  Commonly known as: LASIX   40 mg, Oral, Daily      lisinopril 40 MG tablet  Commonly known as: PRINIVIL,ZESTRIL   40 mg, Oral, Daily      MULTIPLE VITAMIN PO   Oral, Daily      ZYRTEC PO   Oral, Daily             Discharge disposition: home    Follow-up Appointments  Follow-up Information     Alex Wall MD .    Specialty: Internal Medicine  Contact information:  [address] [facility] [id number] [phone]             Brennon Brown MD Follow up in 6 week(s).    Specialties: Cardiology, Cardiac Electrophysiology  Contact information:  [address]  [facility] [address]  [place] [id number] [phone]                 Future Appointments   Date Time Provider Department Center   11/19/2020  9:45 AM Brennon Brown MD [facility] None   1/11/2021 12:00 PM Florencio Pradhan MD [facility] None   2/11/2021  8:45 AM Alex Wall MD [facility] None         STEPHANY Garcia  10/08/20  13:53 EDT      Electronically signed by Brennon Brown MD at 10/09/20 1207

## 2020-10-09 NOTE — OUTREACH NOTE
Call Center TCM Note      Responses   [facility] [place] patient discharged from?  [place]   Does the patient have one of the following disease processes/diagnoses(primary or secondary)?  Other   TCM attempt successful?  No   Unsuccessful attempts  Attempt 2          Rosalinda Sellers RN    10/9/2020, 15:16 EDT

## 2020-10-09 NOTE — NURSING NOTE
Discharge instructions given, patient and family verbalized understanding, notified desk of need for transportation, none arrived, staff assisted patient to car.

## 2020-10-09 NOTE — OUTREACH NOTE
Prep Survey      Responses   [facility] [place] patient discharged from?  [place]   Is LACE score < 7 ?  Yes   Eligibility  [facility]   Date of Admission  10/06/20   Date of Discharge  10/08/20   Discharge Disposition  Home or Self Care   Discharge diagnosis  A-fib converted with meds   Does the patient have one of the following disease processes/diagnoses(primary or secondary)?  Other   Does the patient have Home health ordered?  No   Is there a DME ordered?  No   Prep survey completed?  Yes          Geraldine Mike RN

## 2020-10-09 NOTE — TELEPHONE ENCOUNTER
----- Message from Marcell Santoson sent at 10/8/2020  4:20 PM EDT -----  Regarding: Non-Urgent Medical Question  Contact: [phone]  Doc, waiting to get discharged from hospital and remembered the  question I wanted to ask.  Would you recommend I schedule a sleep study ?  Originally one was scheduled early in year but COVID hit and was unable to follow up .  Thank you for time today and providing clarity .

## 2020-10-09 NOTE — OUTREACH NOTE
Call Center TCM Note      Responses   [facility] patient discharged from?  [place]   Does the patient have one of the following disease processes/diagnoses(primary or secondary)?  Other   TCM attempt successful?  No   Unsuccessful attempts  Attempt 1          Rosalinda Sellers RN    10/9/2020, 14:35 EDT

## 2020-10-11 ENCOUNTER — TRANSITIONAL CARE MANAGEMENT TELEPHONE ENCOUNTER (OUTPATIENT)
Dept: CALL CENTER | Facility: HOSPITAL | Age: 65
End: 2020-10-11

## 2020-10-11 NOTE — OUTREACH NOTE
Call Center TCM Note      Responses   [facility] patient discharged from?  [place]   Does the patient have one of the following disease processes/diagnoses(primary or secondary)?  Other   TCM attempt successful?  Yes   Call start time  1133   Call end time  1139   Discharge diagnosis  A-fib converted with meds   Is patient permission given to speak with other caregiver?  Yes   List who call center can speak with  Afsaneh De Santiago, spouse   Person spoke with today (if not patient) and relationship  patient   Meds reviewed with patient/caregiver?  Yes   Is the patient having any side effects they believe may be caused by any medication additions or changes?  No   Does the patient have all medications ordered at discharge?  Yes   Is the patient taking all medications as directed (includes completed medication regime)?  Yes   Does the patient have a primary care provider?   Yes   Does the patient have an appointment with their PCP within 7 days of discharge?  Greater than 7 days   Comments regarding PCP  PCP Dr Alex Wall. Hospital follow up appt scheduled for 10/23 1115am   Nursing Interventions  Verified appointment date/time/provider   Has the patient kept scheduled appointments due by today?  N/A   Has home health visited the patient within 72 hours of discharge?  N/A   Psychosocial issues?  No   Did the patient receive a copy of their discharge instructions?  Yes   Nursing interventions  Reviewed instructions with patient   What is the patient's perception of their health status since discharge?  Improving   Is the patient/caregiver able to teach back signs and symptoms related to disease process for when to call PCP?  Yes   Is the patient/caregiver able to teach back signs and symptoms related to disease process for when to call 911?  Yes   Is the patient/caregiver able to teach back the hierarchy of who to call/visit for symptoms/problems? PCP, Specialist, Home health nurse, Urgent Care, ED, 911  Yes    TCM call completed?  Yes          Rosalinda Sellers RN    10/11/2020, 11:40 EDT

## 2020-10-12 NOTE — PAYOR COMM NOTE
"Mark De Santiago (64 y.o. Male)                        ATTENTION;   DC SUMMARY CASE REF #[id number]           Date of Birth Social Security Number Address Home Phone MRN    1955  [address] [facility] [address] [phone] [id number]    [employer]ast[employer] Marital Status          None        Admission Date Admission Type Admitting Provider Attending Provider Department, Room/Bed    10/6/20 Elective Brennon Brown MD  [facility] CARDIOVASC UNIT, 2221/1    Discharge Date Discharge Disposition Discharge Destination        10/8/2020 Home or Self Care              Attending Provider: (none)   Allergies: No Known Allergies    Isolation: None   Infection: None   Code Status: Prior    Ht: 188 cm (74\")   Wt: 107 kg (236 lb)    Admission Cmt: None   Principal Problem: None                Active Insurance as of 10/6/2020     Primary Coverage     Payor Plan Insurance Group Employer/Plan Group    AETNA COMMERCIAL AETNA [id number]     Payor Plan Address Payor Plan Phone Number Payor Plan Fax Number Effective Dates    [address] [phone]  1/1/2019 - None Entered    [facility] [address]       Subscriber Name Subscriber Birth Date Member ID       MARK DE SANTIAGO 1955 [id number]                 Emergency Contacts      (Rel.) Home Phone Work Phone Mobile Phone    Afsaneh De Santiago (Spouse) [phone]               Discharge Summary      Madeline Rojas APRN at 10/08/20 0850     Attestation signed by Brennon Brown MD at 10/09/20 1438    Patient admitted for Tikosyn initiation.  Unfortunately his QT interval is prolonged, and I do not feel that this will be long-term safe medication for him.  We are going to allow him to go home, and see if the A. fib recurs, and try to understand just how symptomatic he is.  He has been somewhat variable in his description of symptoms with atrial fibrillation, sometimes he denies any " symptoms, other times he endorses fatigue.  If he does have symptomatic atrial fibrillation, will consider him for ablation.                                                                                                                        DISCHARGE NOTE    Patient Name: Marcell De Santiago  Age/Sex: 64 y.o. male  : 1955  MRN: [id number]    Date of Discharge:  10/8/2020   Date of Admit: 10/6/2020  Encounter Provider: STEPHANY Garcia  Place of Service: [facility] CARDIOLOGY  Patient Care Team:  Alex Wall MD as PCP - General (Internal Medicine)  Agustin Hinton MD as Surgeon (General Surgery)    Subjective:     Discharge Diagnosis:    A-fib (CMS/HCC)      Hospital Course:     64 yr old male patient of Dr. Pradhan and Dr. Brown with symptomatic persistent afib.  Noted for initiation of dofetilide.  We started him on 500 mcg twice daily, he received 2 doses of 500 mcg, his QTC was a little prolonged, we decreased him to 250 mcg which she received last night, his QTC is still on the longer side so we do not think that this drug is can be a safe drug for him to take.  He did spontaneously convert to SB/SR after the second dose and has maintained sinus rhythm.  We also decreased his metoprolol due to he is bradycardic.  We are going to send him home on anticoagulation and metoprolol and have discussed that the next step will be ablation for treatment of his A. fib.  He did have a cardioversion in the past and did maintain sinus rhythm for several months so he may stay in sinus rhythm for a while, he remains to be seen back can go ahead and schedule for ablation in the next weeks to months.  We will check a couple of EKGs today to make sure that his QTC has improved and as long as it looks okay then he will be able to go home this afternoon.    Vital Signs  Temp:  [97.1 °F (36.2 °C)-97.8 °F (36.6 °C)] 97.3 °F (36.3 °C)  Heart Rate:  [47-60] 54  Resp:  [16] 16  BP:  (109-125)/(63-82) 117/77    Intake/Output Summary (Last 24 hours) at 10/8/2020 1353  Last data filed at 10/8/2020 1222  Gross per 24 hour   Intake 770 ml   Output --   Net 770 ml       Physical Exam:    General Appearance: No acute distress, well developed and well nourished.   Eyes: Conjunctiva and lids: No erythema, swelling, or discharge. Sclera non-icteric.   HENT: Atraumatic, normocephalic. External eyes, ears, and nose normal.   Respiratory: No signs of respiratory distress. Respiration rhythm and depth normal.   Clear to auscultation. No rales, crackles, rhonchi, or wheezing auscultated.   Cardiovascular:  Jugular Venous Pressure: Normal  Heart Rate and Rhythm: Normal, Heart Sounds: Normal S1 and S2. No S3 or S4 noted.  Murmurs: No murmurs noted. No rubs, thrills, or gallops.   Arterial Pulses: Posterior tibialis and dorsalis pedis pulses normal.   Lower Extremities: No edema noted.  Gastrointestinal:  Abdomen soft, non-distended, non-tender.  Musculoskeletal: Normal movement of extremities  Skin: Warm and dry.   Psychiatric: Patient alert and oriented to person, place, and time. Speech and behavior appropriate. Normal mood and affect.    Labs:   Results from last 7 days   Lab Units 10/08/20  0337 10/07/20  0338 10/06/20  1137   SODIUM mmol/L 139 138 140   POTASSIUM mmol/L 4.2 3.7 3.9   CHLORIDE mmol/L 102 103 104   CO2 mmol/L 27.1 26.5 25.1   BUN mg/dL 19 20 18   CREATININE mg/dL 1.11 0.98 0.93   GLUCOSE mg/dL 104* 97 92   CALCIUM mg/dL 8.8 8.7 8.9   AST (SGOT) U/L  --   --  25   ALT (SGPT) U/L  --   --  33         Results from last 7 days   Lab Units 10/06/20  1137   WBC 10*3/mm3 7.32   HEMOGLOBIN g/dL 16.3   HEMATOCRIT % 47.6   PLATELETS 10*3/mm3 246         Results from last 7 days   Lab Units 10/07/20  0338 10/06/20  1137   MAGNESIUM mg/dL 2.2 2.1       Discharge Diet:    Dietary Orders (From admission, onward)     Start     Ordered    10/07/20 1327  Diet Regular; Cardiac  Diet Effective Now     Question  Answer Comment   Diet Texture / Consistency Regular    Common Modifiers Cardiac        10/07/20 1326                Activity at Discharge:  As tolerated    Discharge Medications     Discharge Medications      Changes to Medications      Instructions Start Date   metoprolol tartrate 100 MG tablet  Commonly known as: LOPRESSOR  What changed: how much to take   50 mg, Oral, 2 Times Daily         Continue These Medications      Instructions Start Date   apixaban 5 MG tablet tablet  Commonly known as: ELIQUIS   5 mg, Oral, Every 12 Hours Scheduled      furosemide 40 MG tablet  Commonly known as: LASIX   40 mg, Oral, Daily      lisinopril 40 MG tablet  Commonly known as: PRINIVIL,ZESTRIL   40 mg, Oral, Daily      MULTIPLE VITAMIN PO   Oral, Daily      ZYRTEC PO   Oral, Daily             Discharge disposition: home    Follow-up Appointments  Follow-up Information     Alex Wall MD .    Specialty: Internal Medicine  Contact information:  [address] [place] [id number] [phone]             Brennon Brown MD Follow up in 6 week(s).    Specialties: Cardiology, Cardiac Electrophysiology  Contact information:  [address]  [facility] [address]  [place] [id number] [phone]                 Future Appointments   Date Time Provider Department Center   11/19/2020  9:45 AM Brennon Brown MD [facility] None   1/11/2021 12:00 PM Florencio Pradhan MD [facility] None   2/11/2021  8:45 AM Alex Wall MD [facility] None         STEPHANY Garcia  10/08/20  13:53 EDT      Electronically signed by Brennon Brown MD at 10/09/20 9318

## 2020-10-23 ENCOUNTER — OFFICE VISIT (OUTPATIENT)
Dept: FAMILY MEDICINE CLINIC | Facility: CLINIC | Age: 65
End: 2020-10-23

## 2020-10-23 VITALS
WEIGHT: 232 LBS | SYSTOLIC BLOOD PRESSURE: 112 MMHG | BODY MASS INDEX: 29.77 KG/M2 | DIASTOLIC BLOOD PRESSURE: 68 MMHG | HEART RATE: 85 BPM | HEIGHT: 74 IN | OXYGEN SATURATION: 95 % | TEMPERATURE: 98 F

## 2020-10-23 DIAGNOSIS — I10 ESSENTIAL HYPERTENSION: ICD-10-CM

## 2020-10-23 DIAGNOSIS — I48.0 PAROXYSMAL ATRIAL FIBRILLATION (HCC): Primary | ICD-10-CM

## 2020-10-23 DIAGNOSIS — E78.5 DYSLIPIDEMIA: ICD-10-CM

## 2020-10-23 PROBLEM — I48.91 ATRIAL FIBRILLATION (HCC): Status: RESOLVED | Noted: 2019-11-24 | Resolved: 2020-10-23

## 2020-10-23 PROCEDURE — 99214 OFFICE O/P EST MOD 30 MIN: CPT | Performed by: INTERNAL MEDICINE

## 2020-10-23 NOTE — PROGRESS NOTES
Subjective   Marcell De Santiago is a 64 y.o. male.     Vitals:    10/23/20 1134   BP: 112/68   Pulse: 85   Temp: 98 °F (36.7 °C)   SpO2: 95%      Body mass index is 29.79 kg/m².     History of Present Illness   Patient was seen for atrial fib.  Patient recently went in the hospital to try to be converted to normal sinus rhythm.  It was not successful and patient is staying in atrial flutter.  Patient is taking Eliquis with metoprolol.  Patient's blood pressures been running 110s over 80s.  Patient's lipids been treated with diet and exercise.  Triglycerides 116, HDL 27, LDL 72.  Patient will continue present diet and activity levels.  Patient will follow-up in 6 months for a physical.    Dictated utilizing Dragon dictation. If there are questions or for further clarification, please contact me.  The following portions of the patient's history were reviewed and updated as appropriate: allergies, current medications, past family history, past medical history, past social history, past surgical history and problem list.    Review of Systems   Constitutional: Negative for fatigue and fever.   HENT: Positive for congestion. Negative for trouble swallowing.    Eyes: Negative for discharge and visual disturbance.   Respiratory: Negative for choking and shortness of breath.    Cardiovascular: Negative for chest pain and palpitations.   Gastrointestinal: Negative for abdominal pain and blood in stool.   Endocrine: Negative.    Genitourinary: Negative for genital sores and hematuria.   Musculoskeletal: Negative for gait problem and joint swelling.   Skin: Negative for color change, pallor, rash and wound.   Allergic/Immunologic: Positive for environmental allergies. Negative for immunocompromised state.   Neurological: Negative for facial asymmetry and speech difficulty.   Psychiatric/Behavioral: Negative for hallucinations and suicidal ideas.       Objective   Physical Exam  Vitals signs and nursing note reviewed.    Constitutional:       Appearance: Normal appearance. He is well-developed.   HENT:      Head: Normocephalic and atraumatic.      Nose: Nose normal.      Mouth/Throat:      Mouth: Mucous membranes are moist.      Pharynx: Oropharynx is clear.   Eyes:      Extraocular Movements: Extraocular movements intact.      Conjunctiva/sclera: Conjunctivae normal.      Pupils: Pupils are equal, round, and reactive to light.   Neck:      Musculoskeletal: Normal range of motion and neck supple.   Cardiovascular:      Rate and Rhythm: Normal rate. Rhythm irregular.      Heart sounds: Normal heart sounds. No murmur. No friction rub. No gallop.    Pulmonary:      Effort: Pulmonary effort is normal. No respiratory distress.      Breath sounds: Normal breath sounds. No stridor. No wheezing, rhonchi or rales.   Chest:      Chest wall: No tenderness.   Abdominal:      General: Bowel sounds are normal.      Palpations: Abdomen is soft.   Musculoskeletal: Normal range of motion.   Skin:     General: Skin is warm and dry.   Neurological:      General: No focal deficit present.      Mental Status: He is alert and oriented to person, place, and time. Mental status is at baseline.   Psychiatric:         Mood and Affect: Mood normal.         Behavior: Behavior normal.         Thought Content: Thought content normal.         Judgment: Judgment normal.         Assessment/Plan #1 continue to monitor blood pressure #2 continue present diet and activity level #3 follow-up in 6 months  Problems Addressed this Visit     Cardiovascular and Mediastinum              Essential hypertension    A-fib (CMS/HCC) - Primary          Other              Dyslipidemia            Diagnoses     Diagnosis Codes Comments    Paroxysmal atrial fibrillation (CMS/[facility])    -  Primary ICD-10-CM: I48.0  ICD-9-CM: 427.31     Dyslipidemia     ICD-10-CM: E78.5  ICD-9-CM: 272.4     Essential hypertension     ICD-10-CM: I10  ICD-9-CM: 401.9

## 2020-11-04 RX ORDER — FUROSEMIDE 40 MG/1
TABLET ORAL
Qty: 30 TABLET | Refills: 0 | Status: SHIPPED | OUTPATIENT
Start: 2020-11-04 | End: 2020-12-04 | Stop reason: SDUPTHER

## 2020-11-12 ENCOUNTER — OFFICE VISIT (OUTPATIENT)
Dept: CARDIOLOGY | Facility: CLINIC | Age: 65
End: 2020-11-12

## 2020-11-12 VITALS
HEIGHT: 74 IN | SYSTOLIC BLOOD PRESSURE: 124 MMHG | HEART RATE: 116 BPM | BODY MASS INDEX: 30.54 KG/M2 | WEIGHT: 238 LBS | DIASTOLIC BLOOD PRESSURE: 84 MMHG

## 2020-11-12 DIAGNOSIS — E66.9 OBESITY (BMI 30-39.9): ICD-10-CM

## 2020-11-12 DIAGNOSIS — I10 ESSENTIAL HYPERTENSION: ICD-10-CM

## 2020-11-12 DIAGNOSIS — I48.0 PAROXYSMAL ATRIAL FIBRILLATION (HCC): Primary | ICD-10-CM

## 2020-11-12 PROCEDURE — 99213 OFFICE O/P EST LOW 20 MIN: CPT | Performed by: INTERNAL MEDICINE

## 2020-11-12 PROCEDURE — 93000 ELECTROCARDIOGRAM COMPLETE: CPT | Performed by: INTERNAL MEDICINE

## 2020-11-12 RX ORDER — DILTIAZEM HYDROCHLORIDE 120 MG/1
120 CAPSULE, COATED, EXTENDED RELEASE ORAL DAILY
Qty: 90 CAPSULE | Refills: 3 | Status: SHIPPED | OUTPATIENT
Start: 2020-11-12 | End: 2020-12-04 | Stop reason: SDUPTHER

## 2020-11-12 NOTE — PROGRESS NOTES
Date of Office Visit: 2020  Encounter Provider: Brennon Brown MD  Place of Service: [facility] CARDIOLOGY  Patient Name: Marcell De Santiago  :1955    Chief Complaint   Patient presents with   • Atrial Fibrillation     6 wk hosp follow up / did not tolerate tikosyn   :     HPI: Marcell De Santiago is a 64 y.o. male who presents today for follow-up of persistent atrial fibrillation.  He has been very clear to delineate the patient's level of symptoms with atrial fibrillation.  We admitted him to the hospital for initiation of Tikosyn after he had failed previous cardioversion, in hopes of maintaining sinus rhythm for period of time, and better assessing his level of symptoms with atrial fibrillation.  Unfortunately, he had unacceptable QT prolongation, and especially given his low level of perceived symptoms, we did not feel it was safe to continue on the medication.  Since going home, he reports he is felt well, he has been able to engage in activities of daily living, and yard work.  He is also working, he does not really feel that the atrial fibrillation impacts his quality of life.  He did have a couple episodes where he had to really push himself, he recalls one instance where he was out walking the dog and had to hurry home before it started raining.  He felt a little shortness of breath with this exertion.  Otherwise, he really says he has been fine, except for some personal stresses.  He is anticoagulated with Eliquis, his chads vas score is 1.          Past Medical History:   Diagnosis Date   • A-fib (CMS/HCC)    • Atrial fibrillation (CMS/HCC)    • Cellulitis    • Chronic lumbar pain    • Dyslipidemia 2018   • Essential hypertension 2016   • History of low potassium        Past Surgical History:   Procedure Laterality Date   • COLONOSCOPY     • COLONOSCOPY W/ BIOPSIES  2013    Dr. Farfan; hyperplastic polyp   • HYDROCELE EXCISION / REPAIR     •  INGUINAL HERNIA REPAIR Bilateral 10/02/2007    Dr Hinton   • INGUINAL HERNIA REPAIR  2007    Did a long time ago   • KNEE ARTHROSCOPY Right 1986   • PILONIDAL CYST / SINUS EXCISION  1981   • VASECTOMY  1996       Social History     Socioeconomic History   • Marital status:      Spouse name: Not on file   • Number of children: Not on file   • Years of education: Not on file   • Highest education level: Not on file   Tobacco Use   • Smoking status: Former Smoker   • Smokeless tobacco: Never Used   • Tobacco comment: caffeine use - 1 cup coffee daily   Substance and Sexual Activity   • Alcohol use: Yes     Comment: very rare   • Drug use: No   • Sexual activity: Not Currently     Partners: Female     Birth control/protection: Surgical     Comment: Vasectomy   Lifestyle   • Physical activity     Days per week: 7 days     Minutes per session: 30 min   • Stress: Not on file       Family History   Problem Relation Age of Onset   • Thyroid cancer Mother    • Transient ischemic attack Mother    • Arrhythmia Mother    • Hypertension Mother    • Aortic aneurysm Father    • Heart disease Father    • Arrhythmia Father    • Diabetes type II Sister    • Hypothyroidism Sister    • Diabetes type II Brother    • Multiple sclerosis Brother    • Diabetes Brother         Has MS   • Diabetes type II Brother    • Prostate cancer Brother    • Diabetes Sister         Type 1       Review of Systems   Constitution: Negative for malaise/fatigue.   HENT: Negative.    Eyes: Negative.    Cardiovascular: Negative for chest pain, dyspnea on exertion, leg swelling and near-syncope.   Respiratory: Negative for cough and shortness of breath.    Endocrine: Negative.    Hematologic/Lymphatic: Negative.    Skin: Negative.    Musculoskeletal: Negative.    Gastrointestinal: Negative.    Genitourinary: Negative.    Neurological: Negative.  Negative for weakness.   Psychiatric/Behavioral: Negative.    Allergic/Immunologic: Negative.        No Known  "Allergies      Current Outpatient Medications:   •  apixaban (ELIQUIS) 5 MG tablet tablet, Take 1 tablet by mouth Every 12 (Twelve) Hours., Disp: 60 tablet, Rfl: 5  •  Cetirizine HCl (ZYRTEC PO), Take  by mouth Daily., Disp: , Rfl:   •  furosemide (LASIX) 40 MG tablet, TAKE ONE TABLET BY MOUTH DAILY, Disp: 30 tablet, Rfl: 0  •  lisinopril (PRINIVIL,ZESTRIL) 40 MG tablet, Take 1 tablet by mouth Daily., Disp: 90 tablet, Rfl: 3  •  metoprolol tartrate (LOPRESSOR) 100 MG tablet, Take 0.5 tablets by mouth 2 (Two) Times a Day., Disp: 60 tablet, Rfl: 11  •  MULTIPLE VITAMIN PO, Take  by mouth Daily., Disp: , Rfl:   •  dilTIAZem CD (CARDIZEM CD) 120 MG 24 hr capsule, Take 1 capsule by mouth Daily., Disp: 90 capsule, Rfl: 3      Objective:     Vitals:    11/12/20 0939   BP: 124/84   BP Location: Right arm   Patient Position: Sitting   Cuff Size: Large Adult   Pulse: 116   Weight: 108 kg (238 lb)   Height: 188 cm (74\")     Body mass index is 30.56 kg/m².    PHYSICAL EXAM:    Vitals signs and nursing note reviewed.   Constitutional:       Appearance: Healthy appearance.   HENT:    Mouth/Throat:      Pharynx: Oropharynx is clear.   Neck:      Musculoskeletal: Normal range of motion.   Pulmonary:      Effort: Pulmonary effort is normal.   Cardiovascular:      Tachycardia present. Irregularly irregular rhythm.      Murmurs: There is no murmur.   Neurological:      General: No focal deficit present.      Mental Status: Alert and oriented to person, place and time.             ECG 12 Lead    Date/Time: 11/12/2020 4:20 PM  Performed by: Brennon Brown MD  Authorized by: Brennon Brown MD   Comparison: compared with previous ECG from 10/8/2020  Comparison to previous ECG: Atrial fibrillation has replaced sinus bradycardia  Rhythm: atrial fibrillation              Assessment:       Diagnosis Plan   1. Paroxysmal atrial fibrillation (CMS/HCC)     2. Essential hypertension            Plan:       1.  Persistent atrial " fibrillation, stable    Patient denies any significant symptoms in atrial fibrillation.  He is in RVR today.  Been added Cardizem to slow his rate, continue to observe.  Possibly move forward with ablation, but at this point not felt to be urgent.  If he still feels fine in a few months, will likely just follow a rate control strategy.    As always, it has been a pleasure to participate in your patient's care.      Sincerely,         Brennon Brown MD

## 2020-12-04 RX ORDER — FUROSEMIDE 40 MG/1
40 TABLET ORAL DAILY
Qty: 30 TABLET | Refills: 3 | Status: SHIPPED | OUTPATIENT
Start: 2020-12-04 | End: 2021-03-30

## 2020-12-04 RX ORDER — LISINOPRIL 40 MG/1
40 TABLET ORAL DAILY
Qty: 90 TABLET | Refills: 3 | Status: SHIPPED | OUTPATIENT
Start: 2020-12-04 | End: 2021-10-01 | Stop reason: SDUPTHER

## 2020-12-04 RX ORDER — DILTIAZEM HYDROCHLORIDE 120 MG/1
120 CAPSULE, COATED, EXTENDED RELEASE ORAL DAILY
Qty: 90 CAPSULE | Refills: 3 | Status: SHIPPED | OUTPATIENT
Start: 2020-12-04 | End: 2021-10-01 | Stop reason: SDUPTHER

## 2020-12-28 RX ORDER — METOPROLOL TARTRATE 50 MG/1
TABLET, FILM COATED ORAL
Qty: 60 TABLET | Refills: 4 | Status: SHIPPED | OUTPATIENT
Start: 2020-12-28 | End: 2021-06-01 | Stop reason: SDUPTHER

## 2021-02-04 ENCOUNTER — TELEPHONE (OUTPATIENT)
Dept: FAMILY MEDICINE CLINIC | Facility: CLINIC | Age: 66
End: 2021-02-04

## 2021-02-04 DIAGNOSIS — I48.0 PAROXYSMAL ATRIAL FIBRILLATION (HCC): Primary | ICD-10-CM

## 2021-02-04 DIAGNOSIS — E55.9 VITAMIN D DEFICIENCY: ICD-10-CM

## 2021-02-04 DIAGNOSIS — I10 ESSENTIAL HYPERTENSION: ICD-10-CM

## 2021-02-04 DIAGNOSIS — E78.5 DYSLIPIDEMIA: ICD-10-CM

## 2021-02-04 NOTE — TELEPHONE ENCOUNTER
Patient called and requested lab orders be placed for upcoming appointment on 2/23. Patient would like orders be sent to [facility] in [facility] to get labs a week before appt     Please advise     [phone]

## 2021-02-23 ENCOUNTER — OFFICE VISIT (OUTPATIENT)
Dept: FAMILY MEDICINE CLINIC | Facility: CLINIC | Age: 66
End: 2021-02-23

## 2021-02-23 VITALS
OXYGEN SATURATION: 97 % | TEMPERATURE: 96.4 F | HEIGHT: 74 IN | WEIGHT: 233 LBS | HEART RATE: 100 BPM | DIASTOLIC BLOOD PRESSURE: 66 MMHG | SYSTOLIC BLOOD PRESSURE: 112 MMHG | RESPIRATION RATE: 20 BRPM | BODY MASS INDEX: 29.9 KG/M2

## 2021-02-23 DIAGNOSIS — E78.5 DYSLIPIDEMIA: ICD-10-CM

## 2021-02-23 DIAGNOSIS — I48.0 PAROXYSMAL ATRIAL FIBRILLATION (HCC): ICD-10-CM

## 2021-02-23 DIAGNOSIS — I10 ESSENTIAL HYPERTENSION: Primary | ICD-10-CM

## 2021-02-23 DIAGNOSIS — B02.9 HERPES ZOSTER WITHOUT COMPLICATION: ICD-10-CM

## 2021-02-23 PROCEDURE — 99214 OFFICE O/P EST MOD 30 MIN: CPT | Performed by: INTERNAL MEDICINE

## 2021-02-23 RX ORDER — VALACYCLOVIR HYDROCHLORIDE 1 G/1
1000 TABLET, FILM COATED ORAL 3 TIMES DAILY
Qty: 21 TABLET | Refills: 3 | Status: SHIPPED | OUTPATIENT
Start: 2021-02-23 | End: 2021-09-01

## 2021-02-23 NOTE — PROGRESS NOTES
Subjective   Marcell De Santiago is a 65 y.o. male.     Vitals:    02/23/21 0945   BP: 112/66   Pulse: 100   Resp: 20   Temp: 96.4 °F (35.8 °C)   SpO2: 97%      Body mass index is 29.92 kg/m².     History of Present Illness   Patient was seen for hypertension.  Blood pressure at home often running 130s over 80s.  Pulse at home was also been in the 60s.  Patient blood pressure and pulse goes up in the office.  Patient's lipids treated with diet exercise.  Triglycerides 116, HDL 27, LDL 72.  Patient also has paroxysmal atrial fibrillation.  Patient is taking Eliquis 5 mg twice daily.  Patient also uses metoprolol 50 mg daily for rate control.  Patient does have extensive herpes rash in his right rib area extending all the way to the back of the neck.  Lesions are active and he was given Valtrex 1 g 3 times daily.  Patient will get his Shingrix vaccine when appropriate.    Dictated utilizing Dragon dictation. If there are questions or for further clarification, please contact me.  The following portions of the patient's history were reviewed and updated as appropriate: allergies, current medications, past family history, past medical history, past social history, past surgical history and problem list.    Review of Systems   Constitutional: Negative for fatigue and fever.   HENT: Positive for congestion. Negative for trouble swallowing.    Eyes: Negative for discharge and visual disturbance.   Respiratory: Negative for choking and shortness of breath.    Cardiovascular: Negative for chest pain and palpitations.   Gastrointestinal: Negative for abdominal pain and blood in stool.   Endocrine: Negative.    Genitourinary: Negative for genital sores and hematuria.   Musculoskeletal: Negative for gait problem and joint swelling.   Skin: Positive for rash. Negative for color change, pallor and wound.        Shingles right axillary area   Allergic/Immunologic: Positive for environmental allergies. Negative for immunocompromised  state.   Neurological: Negative for facial asymmetry and speech difficulty.   Psychiatric/Behavioral: Negative for hallucinations and suicidal ideas.       Objective   Physical Exam  Vitals signs and nursing note reviewed.   Constitutional:       Appearance: Normal appearance. He is well-developed.   HENT:      Head: Normocephalic and atraumatic.      Nose: Nose normal.      Mouth/Throat:      Mouth: Mucous membranes are moist.      Pharynx: Oropharynx is clear.   Eyes:      Extraocular Movements: Extraocular movements intact.      Conjunctiva/sclera: Conjunctivae normal.      Pupils: Pupils are equal, round, and reactive to light.   Neck:      Musculoskeletal: Normal range of motion and neck supple.   Cardiovascular:      Rate and Rhythm: Normal rate and regular rhythm.      Heart sounds: Normal heart sounds.   Pulmonary:      Effort: Pulmonary effort is normal.      Breath sounds: Normal breath sounds.   Abdominal:      General: Bowel sounds are normal.      Palpations: Abdomen is soft.   Musculoskeletal: Normal range of motion.   Skin:     General: Skin is warm and dry.      Findings: Rash present.      Comments: Shingles right axillary area   Neurological:      General: No focal deficit present.      Mental Status: He is alert and oriented to person, place, and time. Mental status is at baseline.   Psychiatric:         Mood and Affect: Mood normal.         Behavior: Behavior normal.         Thought Content: Thought content normal.         Judgment: Judgment normal.         Assessment/Plan #1 continue to monitor blood pressure #2 continue present diet and activity levels #3 Valtrex 1 g 3 times daily #4 Shingrix No. 5 Covid vaccine patient was informed cannot mix Shingrix and Covid together.  Problems Addressed this Visit     Cardiac and Vasculature              Essential hypertension - Primary    Dyslipidemia    A-fib (CMS/[facility])            Other Visit Diagnoses     Herpes zoster without complication         Relevant Medications    valACYclovir (Valtrex) 1000 MG tablet      Diagnoses     Diagnosis Codes Comments    Essential hypertension    -  Primary ICD-10-CM: I10  ICD-9-CM: 401.9     Paroxysmal atrial fibrillation (CMS/HCC)     ICD-10-CM: I48.0  ICD-9-CM: 427.31     Dyslipidemia     ICD-10-CM: E78.5  ICD-9-CM: 272.4     Herpes zoster without complication     ICD-10-CM: B02.9  ICD-9-CM: 053.9

## 2021-03-02 ENCOUNTER — TELEPHONE (OUTPATIENT)
Dept: FAMILY MEDICINE CLINIC | Facility: CLINIC | Age: 66
End: 2021-03-02

## 2021-03-02 NOTE — TELEPHONE ENCOUNTER
Caller: Marcell De Santiago    Relationship to patient: Self    Best call back number: [phone]    Patient is needing: PATIENT IS GOING TO START ROUND 2 OF HIS SHINGLES MEDICATION AND JUST WANTS TO MAKE SURE THAT'S OK AND IT'S ON FILE. HE WAS GIVEN REFILLS. HE STILL HAS BLISTERS.

## 2021-03-16 ENCOUNTER — OFFICE VISIT (OUTPATIENT)
Dept: CARDIOLOGY | Facility: CLINIC | Age: 66
End: 2021-03-16

## 2021-03-16 VITALS
HEIGHT: 74 IN | BODY MASS INDEX: 29.9 KG/M2 | HEART RATE: 83 BPM | SYSTOLIC BLOOD PRESSURE: 138 MMHG | WEIGHT: 233 LBS | DIASTOLIC BLOOD PRESSURE: 88 MMHG

## 2021-03-16 DIAGNOSIS — I48.19 ATRIAL FIBRILLATION, PERSISTENT (HCC): Primary | ICD-10-CM

## 2021-03-16 PROCEDURE — 99213 OFFICE O/P EST LOW 20 MIN: CPT | Performed by: INTERNAL MEDICINE

## 2021-03-16 PROCEDURE — 93000 ELECTROCARDIOGRAM COMPLETE: CPT | Performed by: INTERNAL MEDICINE

## 2021-03-16 NOTE — PROGRESS NOTES
Date of Office Visit: 2021  Encounter Provider: Brennon Brown MD  Place of Service: [facility] CARDIOLOGY  Patient Name: Marcell De Santiago  :1955    Chief Complaint   Patient presents with   • Atrial Fibrillation     3 mnth follow up   :     HPI: Marcell De Santiago is a 65 y.o. male who presents today for persistent atrial fibrillation.    The patient reports that he is doing great.  He notices no symptoms from atrial fibrillation in daily life, and recently completed 2 trips to [place], including skiing with no limitations.    We attempted Tikosyn, but stopped it due to unacceptable QT prolongation.    He presents a notebook of blood pressures and heart rates, there are no elevated heart rates noted.          Past Medical History:   Diagnosis Date   • A-fib (CMS/HCC)    • Atrial fibrillation (CMS/HCC)    • Cellulitis    • Chronic lumbar pain    • Dyslipidemia 2018   • Essential hypertension 2016   • History of low potassium        Past Surgical History:   Procedure Laterality Date   • COLONOSCOPY     • COLONOSCOPY W/ BIOPSIES  2013    Dr. Farfan; hyperplastic polyp   • HYDROCELE EXCISION / REPAIR     • INGUINAL HERNIA REPAIR Bilateral 10/02/2007    Dr Hinton   • INGUINAL HERNIA REPAIR      Did a long time ago   • KNEE ARTHROSCOPY Right    • PILONIDAL CYST / SINUS EXCISION     • VASECTOMY         Social History     Socioeconomic History   • Marital status:      Spouse name: Not on file   • Number of children: Not on file   • Years of education: Not on file   • Highest education level: Not on file   Tobacco Use   • Smoking status: Former Smoker   • Smokeless tobacco: Never Used   • Tobacco comment: caffeine use - 1 cup coffee daily   Vaping Use   • Vaping Use: Never used   Substance and Sexual Activity   • Alcohol use: Yes     Comment: very rare   • Drug use: No   • Sexual activity: Not Currently     Partners: Female     Birth  "control/protection: Surgical     Comment: Vasectomy       Family History   Problem Relation Age of Onset   • Thyroid cancer Mother    • Transient ischemic attack Mother    • Arrhythmia Mother    • Hypertension Mother    • Aortic aneurysm Father    • Heart disease Father    • Arrhythmia Father    • Diabetes type II Sister    • Hypothyroidism Sister    • Diabetes type II Brother    • Multiple sclerosis Brother    • Diabetes Brother         Has MS   • Diabetes type II Brother    • Prostate cancer Brother    • Diabetes Sister         Type 1       Review of Systems   Constitutional: Negative.   Cardiovascular: Negative.    Respiratory: Negative.    Gastrointestinal: Negative.        No Known Allergies      Current Outpatient Medications:   •  apixaban (ELIQUIS) 5 MG tablet tablet, Take 1 tablet by mouth Every 12 (Twelve) Hours., Disp: 60 tablet, Rfl: 5  •  Cetirizine HCl (ZYRTEC PO), Take  by mouth Daily., Disp: , Rfl:   •  dilTIAZem CD (CARDIZEM CD) 120 MG 24 hr capsule, Take 1 capsule by mouth Daily., Disp: 90 capsule, Rfl: 3  •  furosemide (LASIX) 40 MG tablet, Take 1 tablet by mouth Daily., Disp: 30 tablet, Rfl: 3  •  lisinopril (PRINIVIL,ZESTRIL) 40 MG tablet, Take 1 tablet by mouth Daily., Disp: 90 tablet, Rfl: 3  •  metoprolol tartrate (LOPRESSOR) 50 MG tablet, TAKE ONE TABLET BY MOUTH TWICE A DAY, Disp: 60 tablet, Rfl: 4  •  MULTIPLE VITAMIN PO, Take  by mouth Daily., Disp: , Rfl:   •  valACYclovir (Valtrex) 1000 MG tablet, Take 1 tablet by mouth 3 (Three) Times a Day., Disp: 21 tablet, Rfl: 3      Objective:     Vitals:    03/16/21 1159   BP: 138/88   Pulse: 83   Weight: 106 kg (233 lb)   Height: 188 cm (74\")     Body mass index is 29.92 kg/m².    PHYSICAL EXAM:    Vitals and nursing note reviewed.   Constitutional:       Appearance: Healthy appearance.   Pulmonary:      Effort: Pulmonary effort is normal.   Cardiovascular:      Normal rate. Irregular rhythm.      Murmurs: There is no murmur.   Skin:     " General: Skin is warm.   Neurological:      Mental Status: Alert and oriented to person, place and time.   Psychiatric:         Attention and Perception: Attention and perception normal.         Behavior: Behavior is cooperative.             ECG 12 Lead    Date/Time: 3/16/2021 5:33 PM  Performed by: Brennon Brown MD  Authorized by: Brennon Brown MD   Comparison: compared with previous ECG from 11/12/2020  Similar to previous ECG  Comparison to previous ECG: Rate is slower  Rhythm: atrial fibrillation              Assessment:       Diagnosis Plan   1. Atrial fibrillation, persistent (CMS/HCC)            Plan:       The patient has persistent/permanent atrial fibrillation.  He endorses no symptoms, and is participating in vigorous exercise at high altitude without symptoms.  We had attempted rhythm control with Tikosyn, but failed due to QT prolongation.  We discussed ablation, but patient was not interested, which is very reasonable given his lack of symptoms.  After discussion today we have elected to just pursue rate control.  His rate looks improved from previous, and he denies any tachycardia or palpitations at home.  Follow-up in 1 year.    As always, it has been a pleasure to participate in your patient's care.      Sincerely,         Brennon Brown MD

## 2021-03-19 ENCOUNTER — BULK ORDERING (OUTPATIENT)
Dept: CASE MANAGEMENT | Facility: OTHER | Age: 66
End: 2021-03-19

## 2021-03-19 DIAGNOSIS — Z23 IMMUNIZATION DUE: ICD-10-CM

## 2021-03-30 RX ORDER — FUROSEMIDE 40 MG/1
TABLET ORAL
Qty: 90 TABLET | Refills: 3 | Status: SHIPPED | OUTPATIENT
Start: 2021-03-30 | End: 2021-10-01 | Stop reason: SDUPTHER

## 2021-06-01 RX ORDER — METOPROLOL TARTRATE 50 MG/1
50 TABLET, FILM COATED ORAL 2 TIMES DAILY
Qty: 60 TABLET | Refills: 4 | Status: SHIPPED | OUTPATIENT
Start: 2021-06-01 | End: 2021-10-01

## 2021-06-01 RX ORDER — APIXABAN 5 MG/1
TABLET, FILM COATED ORAL
Qty: 60 TABLET | Refills: 4 | Status: SHIPPED | OUTPATIENT
Start: 2021-06-01 | End: 2021-10-01

## 2021-06-02 RX ORDER — METOPROLOL TARTRATE 50 MG/1
50 TABLET, FILM COATED ORAL 2 TIMES DAILY
Qty: 180 TABLET | Refills: 3 | Status: CANCELLED | OUTPATIENT
Start: 2021-06-02

## 2021-08-24 ENCOUNTER — TELEPHONE (OUTPATIENT)
Dept: FAMILY MEDICINE CLINIC | Facility: CLINIC | Age: 66
End: 2021-08-24

## 2021-08-24 DIAGNOSIS — Z12.5 PROSTATE CANCER SCREENING: Primary | ICD-10-CM

## 2021-08-24 DIAGNOSIS — I10 ESSENTIAL HYPERTENSION: ICD-10-CM

## 2021-08-24 DIAGNOSIS — E55.9 VITAMIN D DEFICIENCY: ICD-10-CM

## 2021-08-24 DIAGNOSIS — E78.5 DYSLIPIDEMIA: ICD-10-CM

## 2021-08-24 NOTE — TELEPHONE ENCOUNTER
Caller: Marcell De Santiago    Relationship: Self    Best call back number: [phone]    What orders are you requesting (i.e. lab or imaging): LABS    In what timeframe would the patient need to come in: ASAP     Where will you receive your lab/imaging services: [facility]    Additional notes: PATIENT HAS OFFICE VISIT SCHEDULED 9-1-21. ASKING IF HE CAN HAVE LABS DONE BEFORE SO RESULTS CAN BE DISCUSSED AT APPOINTMENT.    PLEASE ADVISE

## 2021-09-01 ENCOUNTER — OFFICE VISIT (OUTPATIENT)
Dept: FAMILY MEDICINE CLINIC | Facility: CLINIC | Age: 66
End: 2021-09-01

## 2021-09-01 VITALS
TEMPERATURE: 97.3 F | WEIGHT: 240 LBS | OXYGEN SATURATION: 97 % | SYSTOLIC BLOOD PRESSURE: 110 MMHG | DIASTOLIC BLOOD PRESSURE: 82 MMHG | BODY MASS INDEX: 30.8 KG/M2 | HEIGHT: 74 IN | HEART RATE: 86 BPM

## 2021-09-01 DIAGNOSIS — E78.5 DYSLIPIDEMIA: ICD-10-CM

## 2021-09-01 DIAGNOSIS — I48.19 ATRIAL FIBRILLATION, PERSISTENT (HCC): ICD-10-CM

## 2021-09-01 DIAGNOSIS — I48.0 PAROXYSMAL ATRIAL FIBRILLATION (HCC): ICD-10-CM

## 2021-09-01 DIAGNOSIS — E55.9 VITAMIN D DEFICIENCY: ICD-10-CM

## 2021-09-01 DIAGNOSIS — Z12.5 PROSTATE CANCER SCREENING: ICD-10-CM

## 2021-09-01 DIAGNOSIS — I10 ESSENTIAL HYPERTENSION: Primary | ICD-10-CM

## 2021-09-01 PROBLEM — B02.9 SHINGLES: Status: ACTIVE | Noted: 2021-09-01

## 2021-09-01 PROCEDURE — 99214 OFFICE O/P EST MOD 30 MIN: CPT | Performed by: INTERNAL MEDICINE

## 2021-09-01 RX ORDER — VALACYCLOVIR HYDROCHLORIDE 1 G/1
1000 TABLET, FILM COATED ORAL 3 TIMES DAILY
Qty: 21 TABLET | Refills: 3 | Status: SHIPPED | OUTPATIENT
Start: 2021-09-01 | End: 2022-03-17

## 2021-09-01 NOTE — PROGRESS NOTES
Subjective   Marcell De Santiago is a 65 y.o. male.     Vitals:    09/01/21 1427   BP: 110/82   Pulse: 86   Temp: 97.3 °F (36.3 °C)   SpO2: 97%      Body mass index is 30.81 kg/m².     History of Present Illness   Patient was seen for hypertension.  Blood pressures been running 120s over 80s.  We will continue diltiazem  mg daily, metoprolol tartrate 50 mg twice daily, lisinopril 40 mg daily.  Patient will continue monitoring blood pressure at home and continue present treatment.  Patient's lipids treated with diet and exercise.  Patient's labs are pending at the time of dictation.  Patient does have a history of atrial fibrillation.  Patient is using Eliquis 5 mg twice daily for clot control and diltiazem  mg daily for rate control.  Patient will continue present treatment and follow-up in 6 months.    Dictated utilizing Dragon dictation. If there are questions or for further clarification, please contact me.  The following portions of the patient's history were reviewed and updated as appropriate: allergies, current medications, past family history, past medical history, past social history, past surgical history and problem list.    Review of Systems   Constitutional: Negative for fatigue and fever.   HENT: Positive for congestion. Negative for trouble swallowing.    Eyes: Negative for discharge and visual disturbance.   Respiratory: Negative for choking and shortness of breath.    Cardiovascular: Negative for chest pain and palpitations.   Gastrointestinal: Negative for abdominal pain and blood in stool.   Endocrine: Negative.    Genitourinary: Negative for genital sores and hematuria.   Musculoskeletal: Negative for gait problem and joint swelling.   Skin: Negative for color change, pallor, rash and wound.   Allergic/Immunologic: Positive for environmental allergies. Negative for immunocompromised state.   Neurological: Negative for facial asymmetry and speech difficulty.   Psychiatric/Behavioral:  Negative for hallucinations and suicidal ideas.       Objective   Physical Exam  Vitals and nursing note reviewed.   Constitutional:       Appearance: Normal appearance. He is well-developed.   HENT:      Head: Normocephalic and atraumatic.      Nose: Nose normal.      Mouth/Throat:      Mouth: Mucous membranes are moist.      Pharynx: Oropharynx is clear.   Eyes:      Extraocular Movements: Extraocular movements intact.      Conjunctiva/sclera: Conjunctivae normal.      Pupils: Pupils are equal, round, and reactive to light.   Cardiovascular:      Rate and Rhythm: Normal rate. Rhythm irregular.      Heart sounds: Normal heart sounds. No murmur heard.   No friction rub. No gallop.    Pulmonary:      Effort: Pulmonary effort is normal. No respiratory distress.      Breath sounds: Normal breath sounds. No stridor. No wheezing, rhonchi or rales.   Chest:      Chest wall: No tenderness.   Abdominal:      General: Bowel sounds are normal.      Palpations: Abdomen is soft.   Musculoskeletal:         General: Normal range of motion.      Cervical back: Normal range of motion and neck supple.   Skin:     General: Skin is warm and dry.   Neurological:      General: No focal deficit present.      Mental Status: He is alert and oriented to person, place, and time. Mental status is at baseline.   Psychiatric:         Mood and Affect: Mood normal.         Behavior: Behavior normal.         Thought Content: Thought content normal.         Judgment: Judgment normal.         Assessment/Plan #1 continue monitoring blood pressure at home #2 continue present diet and activity levels #3 labs  Problems Addressed this Visit     Cardiac and Vasculature            Essential hypertension - Primary    Dyslipidemia    Atrial fibrillation, persistent (CMS/HCC)            Diagnoses     Diagnosis Codes Comments    Essential hypertension    -  Primary ICD-10-CM: I10  ICD-9-CM: 401.9     Atrial fibrillation, persistent (CMS/HCC)     ICD-10-CM:  I48.19  ICD-9-CM: 427.31     Dyslipidemia     ICD-10-CM: E78.5  ICD-9-CM: 272.4

## 2021-10-01 RX ORDER — DILTIAZEM HYDROCHLORIDE 120 MG/1
120 CAPSULE, COATED, EXTENDED RELEASE ORAL DAILY
Qty: 90 CAPSULE | Refills: 1 | Status: SHIPPED | OUTPATIENT
Start: 2021-10-01 | End: 2022-03-17 | Stop reason: SDUPTHER

## 2021-10-01 RX ORDER — LISINOPRIL 40 MG/1
40 TABLET ORAL DAILY
Qty: 90 TABLET | Refills: 1 | Status: SHIPPED | OUTPATIENT
Start: 2021-10-01 | End: 2022-03-17 | Stop reason: SDUPTHER

## 2021-10-01 RX ORDER — METOPROLOL TARTRATE 50 MG/1
TABLET, FILM COATED ORAL
Qty: 60 TABLET | Refills: 4 | Status: SHIPPED | OUTPATIENT
Start: 2021-10-01 | End: 2022-03-17 | Stop reason: SDUPTHER

## 2021-10-01 RX ORDER — APIXABAN 5 MG/1
TABLET, FILM COATED ORAL
Qty: 60 TABLET | Refills: 4 | Status: SHIPPED | OUTPATIENT
Start: 2021-10-01 | End: 2022-02-01 | Stop reason: CLARIF

## 2021-10-01 RX ORDER — FUROSEMIDE 40 MG/1
40 TABLET ORAL DAILY
Qty: 90 TABLET | Refills: 1 | Status: SHIPPED | OUTPATIENT
Start: 2021-10-01 | End: 2022-03-17 | Stop reason: SDUPTHER

## 2021-11-16 ENCOUNTER — TELEPHONE (OUTPATIENT)
Dept: CARDIOLOGY | Facility: CLINIC | Age: 66
End: 2021-11-16

## 2022-02-02 ENCOUNTER — TELEPHONE (OUTPATIENT)
Dept: FAMILY MEDICINE CLINIC | Facility: CLINIC | Age: 67
End: 2022-02-02

## 2022-02-02 NOTE — TELEPHONE ENCOUNTER
Caller: Afsaneh De Santiago    Relationship: Emergency Contact    Best call back number: [phone]      What is the best time to reach you: ANYTIME     Who are you requesting to speak with (clinical staff, provider,  specific staff member): CLINICAL STAFF     What was the call regarding: PATIENT IS NEEDING A REFILL ON ELIQUIZ 5 MG TWICE A DAY.     AFSANEH,ON BH VERBAL, STATES THAT THE PHARMACY NEEDS A NEW WRITTEN PRESCRIPTION FOR THIS MEDICATION    PHARMACY:[facility] [address] [place] [address], [place] - [address] [facility] - [phone]  - [phone]        PLEASE CALL PATIENT IS YOU HAVE ANY QUESTIONS.

## 2022-02-23 ENCOUNTER — TELEPHONE (OUTPATIENT)
Dept: FAMILY MEDICINE CLINIC | Facility: CLINIC | Age: 67
End: 2022-02-23

## 2022-02-23 DIAGNOSIS — E78.5 DYSLIPIDEMIA: ICD-10-CM

## 2022-02-23 DIAGNOSIS — E55.9 VITAMIN D DEFICIENCY: ICD-10-CM

## 2022-02-23 DIAGNOSIS — I48.19 ATRIAL FIBRILLATION, PERSISTENT: Primary | ICD-10-CM

## 2022-02-23 NOTE — TELEPHONE ENCOUNTER
PLEASE PLACE ORDERS FOR LAB WORK IN HIS CHART.  HE WILL GET HIS LABS ON [place] [facility].      CALL BACK #: [phone]

## 2022-03-02 ENCOUNTER — OFFICE VISIT (OUTPATIENT)
Dept: FAMILY MEDICINE CLINIC | Facility: CLINIC | Age: 67
End: 2022-03-02

## 2022-03-02 VITALS
BODY MASS INDEX: 31.78 KG/M2 | WEIGHT: 247.6 LBS | TEMPERATURE: 97.3 F | SYSTOLIC BLOOD PRESSURE: 112 MMHG | HEIGHT: 74 IN | HEART RATE: 56 BPM | OXYGEN SATURATION: 98 % | DIASTOLIC BLOOD PRESSURE: 80 MMHG

## 2022-03-02 DIAGNOSIS — Z00.00 PHYSICAL EXAM, ANNUAL: Primary | ICD-10-CM

## 2022-03-02 PROCEDURE — 99396 PREV VISIT EST AGE 40-64: CPT | Performed by: INTERNAL MEDICINE

## 2022-03-02 NOTE — PROGRESS NOTES
Subjective   Marcell De Santiago is a 66 y.o. male.     Vitals:    03/02/22 0913   BP: 112/80   Pulse: 56   Temp: 97.3 °F (36.3 °C)   SpO2: 98%      Body mass index is 31.78 kg/m².     History of Present Illness   Patient was seen for a physical. Patient diet physical activity were discussed at this visit. Patient's HDL was 24. Patient does not exercise and does not want medication at this time. Patient is due for a recheck colonoscopy because of polyps. Patient wants to schedule himself.    Dictated utilizing Dragon dictation. If there are questions or for further clarification, please contact me.  The following portions of the patient's history were reviewed and updated as appropriate: allergies, current medications, past family history, past medical history, past social history, past surgical history and problem list.    Review of Systems   Constitutional: Negative for fatigue and fever.   HENT: Positive for congestion. Negative for trouble swallowing.    Eyes: Negative for discharge and visual disturbance.   Respiratory: Negative for choking and shortness of breath.    Cardiovascular: Negative for chest pain and palpitations.   Gastrointestinal: Negative for abdominal pain and blood in stool.   Endocrine: Negative.    Genitourinary: Negative for genital sores and hematuria.   Musculoskeletal: Negative for gait problem and joint swelling.   Skin: Negative for color change, pallor, rash and wound.   Allergic/Immunologic: Positive for environmental allergies. Negative for immunocompromised state.   Neurological: Negative for facial asymmetry and speech difficulty.   Psychiatric/Behavioral: Negative for hallucinations and suicidal ideas.       Objective   Physical Exam  Vitals and nursing note reviewed.   Constitutional:       General: He is not in acute distress.     Appearance: Normal appearance. He is well-developed. He is not ill-appearing, toxic-appearing or diaphoretic.   HENT:      Head: Normocephalic and  atraumatic.      Right Ear: Tympanic membrane, ear canal and external ear normal. There is no impacted cerumen.      Left Ear: Tympanic membrane, ear canal and external ear normal. There is no impacted cerumen.      Nose: Nose normal. No congestion or rhinorrhea.      Mouth/Throat:      Mouth: Mucous membranes are moist.      Pharynx: Oropharynx is clear. No oropharyngeal exudate or posterior oropharyngeal erythema.   Eyes:      General: No scleral icterus.        Right eye: No discharge.         Left eye: No discharge.      Extraocular Movements: Extraocular movements intact.      Conjunctiva/sclera: Conjunctivae normal.      Pupils: Pupils are equal, round, and reactive to light.   Neck:      Thyroid: No thyromegaly.      Vascular: No carotid bruit or JVD.      Trachea: No tracheal deviation.   Cardiovascular:      Rate and Rhythm: Normal rate and regular rhythm.      Heart sounds: Normal heart sounds. No murmur heard.  No friction rub. No gallop.    Pulmonary:      Effort: Pulmonary effort is normal. No respiratory distress.      Breath sounds: Normal breath sounds. No stridor. No wheezing, rhonchi or rales.   Chest:      Chest wall: No tenderness.   Abdominal:      General: Bowel sounds are normal. There is no distension.      Palpations: Abdomen is soft. There is no mass.      Tenderness: There is no abdominal tenderness. There is no right CVA tenderness, left CVA tenderness, guarding or rebound.      Hernia: No hernia is present.   Genitourinary:     Comments: Slightly enlarged prostate  Musculoskeletal:         General: No swelling, tenderness, deformity or signs of injury. Normal range of motion.      Cervical back: Normal range of motion and neck supple. No rigidity. No muscular tenderness.      Right lower leg: No edema.      Left lower leg: No edema.   Lymphadenopathy:      Cervical: No cervical adenopathy.   Skin:     General: Skin is warm and dry.      Coloration: Skin is not jaundiced or pale.       Findings: No bruising, erythema, lesion or rash.   Neurological:      General: No focal deficit present.      Mental Status: He is alert and oriented to person, place, and time. Mental status is at baseline.      Cranial Nerves: No cranial nerve deficit.      Sensory: No sensory deficit.      Motor: No weakness or abnormal muscle tone.      Coordination: Coordination normal.      Gait: Gait normal.      Deep Tendon Reflexes: Reflexes normal.   Psychiatric:         Mood and Affect: Mood normal.         Behavior: Behavior normal.         Thought Content: Thought content normal.         Judgment: Judgment normal.         Assessment/Plan #1 physical #2 labs  Problems Addressed this Visit     None      Visit Diagnoses     Physical exam, annual    -  Primary      Diagnoses     Diagnosis Codes Comments    Physical exam, annual    -  Primary ICD-10-CM: Z00.00  ICD-9-CM: V70.0

## 2022-03-17 ENCOUNTER — OFFICE VISIT (OUTPATIENT)
Dept: CARDIOLOGY | Facility: CLINIC | Age: 67
End: 2022-03-17

## 2022-03-17 VITALS
WEIGHT: 245 LBS | SYSTOLIC BLOOD PRESSURE: 140 MMHG | BODY MASS INDEX: 31.44 KG/M2 | HEIGHT: 74 IN | HEART RATE: 105 BPM | DIASTOLIC BLOOD PRESSURE: 100 MMHG

## 2022-03-17 DIAGNOSIS — I10 ESSENTIAL HYPERTENSION: ICD-10-CM

## 2022-03-17 DIAGNOSIS — I48.19 ATRIAL FIBRILLATION, PERSISTENT: Primary | ICD-10-CM

## 2022-03-17 PROCEDURE — 93000 ELECTROCARDIOGRAM COMPLETE: CPT

## 2022-03-17 PROCEDURE — 99213 OFFICE O/P EST LOW 20 MIN: CPT

## 2022-03-17 RX ORDER — LISINOPRIL 40 MG/1
40 TABLET ORAL DAILY
Qty: 90 TABLET | Refills: 3 | Status: SHIPPED | OUTPATIENT
Start: 2022-03-17 | End: 2023-03-07 | Stop reason: SDUPTHER

## 2022-03-17 RX ORDER — METOPROLOL TARTRATE 50 MG/1
50 TABLET, FILM COATED ORAL 2 TIMES DAILY
Qty: 180 TABLET | Refills: 3 | Status: SHIPPED | OUTPATIENT
Start: 2022-03-17 | End: 2022-03-17 | Stop reason: SDUPTHER

## 2022-03-17 RX ORDER — DILTIAZEM HYDROCHLORIDE 120 MG/1
120 CAPSULE, COATED, EXTENDED RELEASE ORAL DAILY
Qty: 90 CAPSULE | Refills: 3 | Status: SHIPPED | OUTPATIENT
Start: 2022-03-17 | End: 2022-03-17 | Stop reason: SDUPTHER

## 2022-03-17 RX ORDER — FUROSEMIDE 40 MG/1
40 TABLET ORAL DAILY
Qty: 90 TABLET | Refills: 3 | Status: SHIPPED | OUTPATIENT
Start: 2022-03-17 | End: 2023-03-20 | Stop reason: SDUPTHER

## 2022-03-17 RX ORDER — DILTIAZEM HYDROCHLORIDE 120 MG/1
120 CAPSULE, COATED, EXTENDED RELEASE ORAL DAILY
Qty: 90 CAPSULE | Refills: 3 | Status: SHIPPED | OUTPATIENT
Start: 2022-03-17 | End: 2023-03-20 | Stop reason: SDUPTHER

## 2022-03-17 RX ORDER — METOPROLOL TARTRATE 50 MG/1
50 TABLET, FILM COATED ORAL 2 TIMES DAILY
Qty: 180 TABLET | Refills: 3 | Status: SHIPPED | OUTPATIENT
Start: 2022-03-17 | End: 2023-03-20 | Stop reason: SDUPTHER

## 2022-03-17 RX ORDER — FUROSEMIDE 40 MG/1
40 TABLET ORAL DAILY
Qty: 90 TABLET | Refills: 3 | Status: SHIPPED | OUTPATIENT
Start: 2022-03-17 | End: 2022-03-17 | Stop reason: SDUPTHER

## 2022-03-17 RX ORDER — LISINOPRIL 40 MG/1
40 TABLET ORAL DAILY
Qty: 90 TABLET | Refills: 3 | Status: SHIPPED | OUTPATIENT
Start: 2022-03-17 | End: 2022-03-17 | Stop reason: SDUPTHER

## 2022-03-17 NOTE — PROGRESS NOTES
Date of Office Visit: 2022  Encounter Provider: STEPHANY Finney  Place of Service: [facility] CARDIOLOGY  Patient Name: Marcell De Santiago  :1955    Chief Complaint   Patient presents with   • persistent AFIB     1 yr f/u    :     HPI: Marcell De Santiago is a 66 y.o. male who is a patient of Dr. Brown.  He has a history of persistent AF, htn, dyslipidemia.    He presents for one year follow up.      He reports that he has done well over the past year.  He is accompanied today by his wife who agrees with this.  He says that he does not notice any symptoms that he associates with his AF.  He denies any chest pain, shortness of breath, palpitations, edema.      Attempted Tikosyn in the past, but stopped due to QT prolongation.    He checks his bp and heart rate every morning.  He reports that his bp is usually 120s systolic and heart rate is 70-80.  He rides an indoor bike 2-3 times a week with no difficulty.    He has a trip planned with his sons to [place] in the next few weeks.     Dr. Wall is his PCP           Past Medical History:   Diagnosis Date   • A-fib (HCC)    • Atrial fibrillation (HCC)    • Cellulitis    • Chronic lumbar pain    • Dyslipidemia 2018   • Essential hypertension 2016   • History of low potassium        Past Surgical History:   Procedure Laterality Date   • COLONOSCOPY     • COLONOSCOPY W/ BIOPSIES  2013    Dr. Farfan; hyperplastic polyp   • HYDROCELE EXCISION / REPAIR     • INGUINAL HERNIA REPAIR Bilateral 10/02/2007    Dr Hinton   • INGUINAL HERNIA REPAIR      Did a long time ago   • KNEE ARTHROSCOPY Right    • PILONIDAL CYST / SINUS EXCISION     • VASECTOMY         Social History     Socioeconomic History   • Marital status:    Tobacco Use   • Smoking status: Former Smoker   • Smokeless tobacco: Never Used   • Tobacco comment: caffeine use - 1 cup coffee daily   Vaping Use   • Vaping Use: Never used    Substance and Sexual Activity   • Alcohol use: Yes     Alcohol/week: 1.0 standard drink     Types: 1 Shots of liquor per week     Comment: Kareem if bourbon glass once a month   • Drug use: No   • Sexual activity: Not Currently     Partners: Female     Birth control/protection: Surgical     Comment: Vasectomy       Family History   Problem Relation Age of Onset   • Thyroid cancer Mother    • Transient ischemic attack Mother    • Arrhythmia Mother    • Hypertension Mother    • Aortic aneurysm Father    • Heart disease Father    • Arrhythmia Father    • Diabetes type II Sister    • Hypothyroidism Sister    • Diabetes type II Brother    • Asthma Brother    • Multiple sclerosis Brother    • Diabetes Brother         Has MS   • Diabetes type II Brother    • Prostate cancer Brother    • Cancer Brother         Prostate   • Diabetes Sister         Type 1       Review of Systems   Constitutional: Negative for chills, fever and malaise/fatigue.   Cardiovascular: Negative for chest pain, dyspnea on exertion, leg swelling, near-syncope, orthopnea, palpitations, paroxysmal nocturnal dyspnea and syncope.   Respiratory: Negative for cough and shortness of breath.    Hematologic/Lymphatic: Negative.    Musculoskeletal: Negative for joint pain, joint swelling and myalgias.   Gastrointestinal: Negative for abdominal pain, diarrhea, melena, nausea and vomiting.   Genitourinary: Negative for frequency and hematuria.   Neurological: Negative for light-headedness, numbness, paresthesias and seizures.   Allergic/Immunologic: Negative.    All other systems reviewed and are negative.      No Known Allergies      Current Outpatient Medications:   •  Cetirizine HCl (ZYRTEC PO), Take  by mouth Daily., Disp: , Rfl:   •  MULTIPLE VITAMIN PO, Take  by mouth Daily., Disp: , Rfl:   •  apixaban (ELIQUIS) 5 MG tablet tablet, Take 1 tablet by mouth 2 (Two) Times a Day., Disp: 180 tablet, Rfl: 3  •  dilTIAZem CD (CARDIZEM CD) 120 MG 24 hr capsule, Take  "1 capsule by mouth Daily., Disp: 90 capsule, Rfl: 3  •  furosemide (LASIX) 40 MG tablet, Take 1 tablet by mouth Daily., Disp: 90 tablet, Rfl: 3  •  lisinopril (PRINIVIL,ZESTRIL) 40 MG tablet, Take 1 tablet by mouth Daily., Disp: 90 tablet, Rfl: 3  •  metoprolol tartrate (LOPRESSOR) 50 MG tablet, Take 1 tablet by mouth 2 (Two) Times a Day., Disp: 180 tablet, Rfl: 3      Objective:     Vitals:    03/17/22 1048   BP: 140/100   Pulse: 105   Weight: 111 kg (245 lb)   Height: 188 cm (74\")     Body mass index is 31.46 kg/m².    PHYSICAL EXAM:    Vitals Reviewed.   General Appearance: No acute distress, well developed and well nourished.   Eyes: Conjunctiva and lids: No erythema, swelling, or discharge. Sclera non-icteric.   HENT: Atraumatic, normocephalic. External eyes, ears, and nose normal.   Respiratory: No signs of respiratory distress. Respiration rhythm and depth normal.   Clear to auscultation. No rales, crackles, rhonchi, or wheezing auscultated.   Cardiovascular:  Heart Rate and Rhythm: Irregularly irregular, Heart Sounds: Normal S1 and S2. No S3 or S4 noted.  Gastrointestinal:  Abdomen soft, non-distended, non-tender.   Musculoskeletal: Normal movement of extremities  Skin: Warm and dry.   Psychiatric: Patient alert and oriented to person, place, and time. Speech and behavior appropriate. Normal mood and affect.       ECG 12 Lead    Date/Time: 3/17/2022 12:25 PM  Performed by: Franklyn Drew APRN  Authorized by: Franklyn Drew APRN   Comparison: compared with previous ECG   Similar to previous ECG  Rhythm: atrial fibrillation  BPM: 105                Assessment:       Diagnosis Plan   1. Atrial fibrillation, persistent (HCC)     2. Essential hypertension            Plan:   1. Persistent AF- he has no symptoms associated with his AF.  He continues to exercise and do all activities he wants with no issues.  He has failed antiarrhythmics and CV in the past, so we have been focusing on rate control.     He and his " wife are very involved in his health care and check his bp and pulse daily.  I advised them that if his resting heart rate is constantly above 100 then he would need to let us know and make adjustments.  If he does become symptomatic with AF, symptoms of HF, or develop cardiomyopathy then we would need to discuss ablation.  He has not wanted to proceed with this in the past due to being asymptomatic.     He is on apixaban for AC    2. HTN- currently well controlled on current regimen.      Follow up in one year or sooner if he has issues.  Consider echo at next visit.      As always, it has been a pleasure to participate in your patient's care.      Sincerely,         STEPHANY Finney

## 2022-07-08 NOTE — SIGNIFICANT NOTE
Patient and wife request information on livining will and need paperwork assistance     Received fax from pharmacy requesting refill on Trazodone.

## 2022-08-23 DIAGNOSIS — E55.9 VITAMIN D DEFICIENCY: ICD-10-CM

## 2022-08-23 DIAGNOSIS — I48.0 PAROXYSMAL ATRIAL FIBRILLATION: ICD-10-CM

## 2022-08-23 DIAGNOSIS — E78.5 DYSLIPIDEMIA: Primary | ICD-10-CM

## 2023-03-07 RX ORDER — LISINOPRIL 40 MG/1
40 TABLET ORAL DAILY
Qty: 90 TABLET | Refills: 3 | Status: SHIPPED | OUTPATIENT
Start: 2023-03-07 | End: 2023-03-31 | Stop reason: SDUPTHER

## 2023-03-20 RX ORDER — FUROSEMIDE 40 MG/1
40 TABLET ORAL DAILY
Qty: 90 TABLET | Refills: 0 | Status: SHIPPED | OUTPATIENT
Start: 2023-03-20 | End: 2023-03-20 | Stop reason: SDUPTHER

## 2023-03-20 RX ORDER — FUROSEMIDE 40 MG/1
40 TABLET ORAL DAILY
Qty: 90 TABLET | Refills: 3 | Status: SHIPPED | OUTPATIENT
Start: 2023-03-20

## 2023-03-20 RX ORDER — DILTIAZEM HYDROCHLORIDE 120 MG/1
120 CAPSULE, COATED, EXTENDED RELEASE ORAL DAILY
Qty: 90 CAPSULE | Refills: 3 | Status: SHIPPED | OUTPATIENT
Start: 2023-03-20

## 2023-03-20 RX ORDER — METOPROLOL TARTRATE 50 MG/1
50 TABLET, FILM COATED ORAL 2 TIMES DAILY
Qty: 180 TABLET | Refills: 3 | Status: SHIPPED | OUTPATIENT
Start: 2023-03-20

## 2023-03-21 ENCOUNTER — OFFICE VISIT (OUTPATIENT)
Dept: CARDIOLOGY | Facility: CLINIC | Age: 68
End: 2023-03-21
Payer: MEDICARE

## 2023-03-21 VITALS
WEIGHT: 240 LBS | HEIGHT: 74 IN | DIASTOLIC BLOOD PRESSURE: 84 MMHG | SYSTOLIC BLOOD PRESSURE: 138 MMHG | HEART RATE: 76 BPM | BODY MASS INDEX: 30.8 KG/M2

## 2023-03-21 DIAGNOSIS — Z12.5 PROSTATE CANCER SCREENING: ICD-10-CM

## 2023-03-21 DIAGNOSIS — I48.19 ATRIAL FIBRILLATION, PERSISTENT: Primary | ICD-10-CM

## 2023-03-21 DIAGNOSIS — E78.5 DYSLIPIDEMIA: ICD-10-CM

## 2023-03-21 PROCEDURE — 3075F SYST BP GE 130 - 139MM HG: CPT | Performed by: INTERNAL MEDICINE

## 2023-03-21 PROCEDURE — 99214 OFFICE O/P EST MOD 30 MIN: CPT | Performed by: INTERNAL MEDICINE

## 2023-03-21 PROCEDURE — 93000 ELECTROCARDIOGRAM COMPLETE: CPT | Performed by: INTERNAL MEDICINE

## 2023-03-21 PROCEDURE — 3079F DIAST BP 80-89 MM HG: CPT | Performed by: INTERNAL MEDICINE

## 2023-03-21 NOTE — PROGRESS NOTES
Date of Office Visit: 2023  Encounter Provider: Brennon Brown MD  Place of Service: [facility] CARDIOLOGY  Patient Name: Marcell De Santiago  :1955    Chief Complaint   Patient presents with   • Atrial Fibrillation   • Hypertension          :     HPI: Marcell De Santiago is a 67 y.o. male who presents today for persistent atrial fibrillation.     He has over a one year history of persistent atrial fibrillation. He was intolerant to dofetilide therapy due to QT prolongation in .  We discussed ablation, but he wished to not pursue at that time.       He feels that he is possibly having more symptoms of atrial fibrillation.   He notes shortness of breath with moderate activity.  Symptoms are inconsistent, and are not noted at other times.     He is anticoagulated with apixaban.      He wishes to discuss ablation      Past Medical History:   Diagnosis Date   • A-fib (HCC)    • Atrial fibrillation (HCC)    • Cellulitis    • Chronic lumbar pain    • Dyslipidemia 2018   • Essential hypertension 2016   • History of low potassium        Past Surgical History:   Procedure Laterality Date   • COLONOSCOPY     • COLONOSCOPY W/ BIOPSIES  2013    Dr. Farfan; hyperplastic polyp   • HYDROCELE EXCISION / REPAIR     • INGUINAL HERNIA REPAIR Bilateral 10/02/2007    Dr Hinton   • INGUINAL HERNIA REPAIR      Did a long time ago   • KNEE ARTHROSCOPY Right    • PILONIDAL CYST / SINUS EXCISION     • VASECTOMY         Social History     Socioeconomic History   • Marital status:    Tobacco Use   • Smoking status: Former   • Smokeless tobacco: Never   • Tobacco comments:     caffeine use - 1 cup coffee daily   Vaping Use   • Vaping Use: Never used   Substance and Sexual Activity   • Alcohol use: Yes     Alcohol/week: 1.0 standard drink     Types: 1 Shots of liquor per week     Comment: Kareem if bourbon glass once a month   • Drug use: No   • Sexual activity:  "Not Currently     Partners: Female     Birth control/protection: Surgical     Comment: Vasectomy       Family History   Problem Relation Age of Onset   • Thyroid cancer Mother    • Transient ischemic attack Mother    • Arrhythmia Mother    • Hypertension Mother    • Aortic aneurysm Father    • Heart disease Father    • Arrhythmia Father    • Diabetes type II Sister    • Hypothyroidism Sister    • Diabetes type II Brother    • Asthma Brother    • Multiple sclerosis Brother    • Diabetes Brother         Has MS   • Diabetes type II Brother    • Prostate cancer Brother    • Cancer Brother         Prostate   • Diabetes Sister         Type 1       Review of Systems   Constitutional: Negative.   Cardiovascular: Negative.    Respiratory: Negative.    Gastrointestinal: Negative.        No Known Allergies      Current Outpatient Medications:   •  apixaban (ELIQUIS) 5 MG tablet tablet, Take 1 tablet by mouth 2 (Two) Times a Day., Disp: 180 tablet, Rfl: 3  •  Cetirizine HCl (ZYRTEC PO), Take  by mouth Daily., Disp: , Rfl:   •  dilTIAZem CD (CARDIZEM CD) 120 MG 24 hr capsule, Take 1 capsule by mouth Daily., Disp: 90 capsule, Rfl: 3  •  furosemide (LASIX) 40 MG tablet, Take 1 tablet by mouth Daily., Disp: 90 tablet, Rfl: 3  •  lisinopril (PRINIVIL,ZESTRIL) 40 MG tablet, Take 1 tablet by mouth Daily., Disp: 90 tablet, Rfl: 3  •  metoprolol tartrate (LOPRESSOR) 50 MG tablet, Take 1 tablet by mouth 2 (Two) Times a Day., Disp: 180 tablet, Rfl: 3  •  MULTIPLE VITAMIN PO, Take  by mouth Daily., Disp: , Rfl:       Objective:     Vitals:    03/21/23 0941   BP: 138/84   Pulse: 76   Weight: 109 kg (240 lb)   Height: 188 cm (74\")     Body mass index is 30.81 kg/m².    PHYSICAL EXAM:    Vitals and nursing note reviewed.   Constitutional:       General: Not in acute distress.  Pulmonary:      Effort: Pulmonary effort is normal. No respiratory distress.   Cardiovascular:      Normal rate. Regular rhythm.   Edema:     Peripheral edema absent. "   Skin:     General: Skin is warm and dry.   Neurological:      Mental Status: Alert and oriented to person, place, and time.   Psychiatric:         Behavior: Behavior normal.         Thought Content: Thought content normal.         Judgment: Judgment normal.             ECG 12 Lead    Date/Time: 3/21/2023 11:54 AM  Performed by: Brennon Brown MD  Authorized by: Brennon Brown MD   Comparison: compared with previous ECG from 3/6/2021  Rhythm: atrial fibrillation              Assessment:       Diagnosis Plan   1. Atrial fibrillation, persistent (HCC)  Ambulatory Referral to Cardiothoracic Surgery             Plan:       Longstanding Persistent Atrial Fibrillation    Due to symptoms, he would now like to reconsider further attempts at rhythm control.     Given the duration of his atrial fibrillation, I think the best option would be to consider hybrid ablation.     We discussed this and he was agreeable to referral.      He is not had any recent stress testing, so if we proceed will need this ordered.     As always, it has been a pleasure to participate in your patient's care.      Sincerely,         Brennon Brown MD

## 2023-03-31 DIAGNOSIS — E78.5 DYSLIPIDEMIA: ICD-10-CM

## 2023-03-31 DIAGNOSIS — Z12.5 PROSTATE CANCER SCREENING: Primary | ICD-10-CM

## 2023-03-31 DIAGNOSIS — I48.19 ATRIAL FIBRILLATION, PERSISTENT: ICD-10-CM

## 2023-03-31 RX ORDER — LISINOPRIL 40 MG/1
40 TABLET ORAL DAILY
Qty: 90 TABLET | Refills: 3 | Status: SHIPPED | OUTPATIENT
Start: 2023-03-31

## 2023-04-04 ENCOUNTER — TELEPHONE (OUTPATIENT)
Dept: FAMILY MEDICINE CLINIC | Facility: CLINIC | Age: 68
End: 2023-04-04
Payer: MEDICARE

## 2023-04-07 ENCOUNTER — OFFICE VISIT (OUTPATIENT)
Dept: FAMILY MEDICINE CLINIC | Facility: CLINIC | Age: 68
End: 2023-04-07
Payer: MEDICARE

## 2023-04-07 VITALS
BODY MASS INDEX: 31.57 KG/M2 | HEIGHT: 74 IN | WEIGHT: 246 LBS | HEART RATE: 64 BPM | DIASTOLIC BLOOD PRESSURE: 86 MMHG | TEMPERATURE: 98.2 F | SYSTOLIC BLOOD PRESSURE: 148 MMHG | OXYGEN SATURATION: 98 %

## 2023-04-07 DIAGNOSIS — Z00.00 MEDICARE ANNUAL WELLNESS VISIT, SUBSEQUENT: Primary | ICD-10-CM

## 2023-04-07 DIAGNOSIS — M54.40 CHRONIC LOW BACK PAIN WITH SCIATICA, SCIATICA LATERALITY UNSPECIFIED, UNSPECIFIED BACK PAIN LATERALITY: ICD-10-CM

## 2023-04-07 DIAGNOSIS — G89.29 CHRONIC LOW BACK PAIN WITH SCIATICA, SCIATICA LATERALITY UNSPECIFIED, UNSPECIFIED BACK PAIN LATERALITY: ICD-10-CM

## 2023-04-07 DIAGNOSIS — E78.5 DYSLIPIDEMIA: ICD-10-CM

## 2023-04-07 DIAGNOSIS — I10 ESSENTIAL HYPERTENSION: ICD-10-CM

## 2023-04-07 PROCEDURE — 1159F MED LIST DOCD IN RCRD: CPT | Performed by: INTERNAL MEDICINE

## 2023-04-07 PROCEDURE — 3079F DIAST BP 80-89 MM HG: CPT | Performed by: INTERNAL MEDICINE

## 2023-04-07 PROCEDURE — 1170F FXNL STATUS ASSESSED: CPT | Performed by: INTERNAL MEDICINE

## 2023-04-07 PROCEDURE — 1160F RVW MEDS BY RX/DR IN RCRD: CPT | Performed by: INTERNAL MEDICINE

## 2023-04-07 PROCEDURE — G0439 PPPS, SUBSEQ VISIT: HCPCS | Performed by: INTERNAL MEDICINE

## 2023-04-07 PROCEDURE — 3077F SYST BP >= 140 MM HG: CPT | Performed by: INTERNAL MEDICINE

## 2023-04-07 NOTE — PROGRESS NOTES
QUICK REFERENCE INFORMATION:  The ABCs of the Annual Wellness Visit    Subsequent Medicare Wellness Visit patient was seen for Medicare wellness exam.    HEALTH RISK ASSESSMENT    1955    Recent Hospitalizations:  No hospitalization(s) within the last year..        Current Medical Providers:  Patient Care Team:  Alex Wall MD as PCP - General (Internal Medicine)  Agustin Hinton MD as Surgeon (General Surgery)        Smoking Status:  Social History     Tobacco Use   Smoking Status Former   Smokeless Tobacco Never   Tobacco Comments    caffeine use - 1 cup coffee daily       Alcohol Consumption:  Social History     Substance and Sexual Activity   Alcohol Use Yes   • Alcohol/week: 1.0 standard drink   • Types: 1 Shots of liquor per week    Comment: Kareem if bourbon glass once a month       Depression Screen:   PHQ-2/PHQ-9 Depression Screening 4/7/2023   Retired PHQ-9 Total Score -   Retired Total Score -   Little Interest or Pleasure in Doing Things 0-->not at all   Feeling Down, Depressed or Hopeless 0-->not at all   PHQ-9: Brief Depression Severity Measure Score 0       Health Habits and Functional and Cognitive Screening:  Functional & Cognitive Status 4/7/2023   Do you have difficulty preparing food and eating? No   Do you have difficulty bathing yourself, getting dressed or grooming yourself? No   Do you have difficulty using the toilet? No   Do you have difficulty moving around from place to place? No   Do you have trouble with steps or getting out of a bed or a chair? No   Current Diet Well Balanced Diet   Dental Exam Up to date   Eye Exam Up to date   Exercise (times per week) 7 times per week   Current Exercises Include Aerobics   Do you need help using the phone?  No   Are you deaf or do you have serious difficulty hearing?  No   Do you need help with transportation? No   Do you need help shopping? No   Do you need help preparing meals?  No   Do you need help with housework?  No   Do you need  help with laundry? No   Do you need help taking your medications? No   Do you need help managing money? No   Do you ever drive or ride in a car without wearing a seat belt? No   Have you felt unusual stress, anger or loneliness in the last month? No   Who do you live with? Spouse   If you need help, do you have trouble finding someone available to you? No   Have you been bothered in the last four weeks by sexual problems? No   Do you have difficulty concentrating, remembering or making decisions? No           Does the patient have evidence of cognitive impairment? No    Aspirin use counseling: Does not need ASA (and currently is not on it)      Recent Lab Results:  CMP:  Lab Results   Component Value Date    BUN 19 10/08/2020    CREATININE 1.11 10/08/2020    EGFRIFNONA 67 10/08/2020    EGFRIFAFRI 77 09/17/2020    BCR 17.1 10/08/2020     10/08/2020    K 4.2 10/08/2020    CO2 27.1 10/08/2020    CALCIUM 8.8 10/08/2020    PROTENTOTREF 6.9 11/19/2018    ALBUMIN 4.20 10/06/2020    LABGLOBREF 2.4 11/19/2018    LABIL2 1.9 11/19/2018    BILITOT 1.0 10/06/2020    ALKPHOS 62 10/06/2020    AST 25 10/06/2020    ALT 33 10/06/2020     Lipid Panel:  Lab Results   Component Value Date    CHOL 122 07/30/2020    TRIG 116 07/30/2020    HDL 27 (L) 07/30/2020    VLDL 23.2 07/30/2020    LDLHDL 2.66 07/30/2020     HbA1c:  Lab Results   Component Value Date    HGBA1C 5.70 (H) 12/16/2019       Visual Acuity:  No results found.    Age-appropriate Screening Schedule:  Refer to the list below for future screening recommendations based on patient's age, sex and/or medical conditions. Orders for these recommended tests are listed in the plan section. The patient has been provided with a written plan.    Health Maintenance   Topic Date Due   • ZOSTER VACCINE (1 of 2) Never done   • HEPATITIS C SCREENING  Never done   • COVID-19 Vaccine (4 - Booster for Pfizer series) 02/08/2022   • Pneumococcal Vaccine 65+ (1 - PCV) Never done   • AAA SCREEN  (ONE-TIME)  Never done   • INFLUENZA VACCINE  08/01/2023   • COLORECTAL CANCER SCREENING  12/16/2023   • ANNUAL WELLNESS VISIT  04/07/2024   • TDAP/TD VACCINES (2 - Td or Tdap) 08/03/2029        Subjective   History of Present Illness    Marcell De Santiago is a 67 y.o. male who presents for an Subsequent Wellness Visit.    The following portions of the patient's history were reviewed and updated as appropriate: allergies, current medications, past family history, past medical history, past social history, past surgical history and problem list.    Outpatient Medications Prior to Visit   Medication Sig Dispense Refill   • apixaban (ELIQUIS) 5 MG tablet tablet Take 1 tablet by mouth 2 (Two) Times a Day. 180 tablet 3   • Cetirizine HCl (ZYRTEC PO) Take  by mouth Daily.     • dilTIAZem CD (CARDIZEM CD) 120 MG 24 hr capsule Take 1 capsule by mouth Daily. 90 capsule 3   • furosemide (LASIX) 40 MG tablet Take 1 tablet by mouth Daily. 90 tablet 3   • lisinopril (PRINIVIL,ZESTRIL) 40 MG tablet Take 1 tablet by mouth Daily. 90 tablet 3   • metoprolol tartrate (LOPRESSOR) 50 MG tablet Take 1 tablet by mouth 2 (Two) Times a Day. 180 tablet 3   • MULTIPLE VITAMIN PO Take  by mouth Daily.       No facility-administered medications prior to visit.       Patient Active Problem List   Diagnosis   • Essential hypertension   • Dyslipidemia   • Chronic lumbar pain   • Left leg cellulitis   • Atrial fibrillation, persistent   • Obesity (BMI 30-39.9)   • Shingles       Advance Care Planning:  ACP discussion was held with the patient during this visit. Patient does not have an advance directive, information provided.    Identification of Risk Factors:  Risk factors include: Advance Directive Discussion.    Review of Systems   Constitutional: Negative for fatigue and fever.   HENT: Positive for congestion. Negative for trouble swallowing.    Eyes: Negative for discharge and visual disturbance.   Respiratory: Negative for choking and shortness  of breath.    Cardiovascular: Negative for chest pain and palpitations.   Gastrointestinal: Negative for abdominal pain and blood in stool.   Endocrine: Negative.    Genitourinary: Negative for genital sores and hematuria.   Musculoskeletal: Negative for gait problem and joint swelling.   Skin: Negative for color change, pallor, rash and wound.   Allergic/Immunologic: Positive for environmental allergies. Negative for immunocompromised state.   Neurological: Negative for facial asymmetry and speech difficulty.   Psychiatric/Behavioral: Negative for hallucinations and suicidal ideas.       Compared to one year ago, the patient feels his physical health is the same.  Compared to one year ago, the patient feels his mental health is the same.    Objective     Physical Exam  Vitals and nursing note reviewed.   Constitutional:       Appearance: Normal appearance. He is well-developed.   HENT:      Head: Normocephalic and atraumatic.      Nose: Nose normal.      Mouth/Throat:      Mouth: Mucous membranes are moist.      Pharynx: Oropharynx is clear.   Eyes:      Extraocular Movements: Extraocular movements intact.      Conjunctiva/sclera: Conjunctivae normal.      Pupils: Pupils are equal, round, and reactive to light.   Cardiovascular:      Rate and Rhythm: Normal rate and regular rhythm.      Heart sounds: Normal heart sounds. No murmur heard.    No friction rub. No gallop.   Pulmonary:      Effort: Pulmonary effort is normal. No respiratory distress.      Breath sounds: Normal breath sounds. No stridor. No wheezing, rhonchi or rales.   Chest:      Chest wall: No tenderness.   Abdominal:      General: Bowel sounds are normal.      Palpations: Abdomen is soft.   Musculoskeletal:         General: Normal range of motion.      Cervical back: Normal range of motion and neck supple.   Skin:     General: Skin is warm and dry.   Neurological:      General: No focal deficit present.      Mental Status: He is alert and oriented to  "person, place, and time. Mental status is at baseline.   Psychiatric:         Mood and Affect: Mood normal.         Behavior: Behavior normal.         Thought Content: Thought content normal.         Judgment: Judgment normal.         Vitals:    04/07/23 1412   BP: 148/86   Pulse: 64   Temp: 98.2 °F (36.8 °C)   SpO2: 98%   Weight: 112 kg (246 lb)   Height: 188 cm (74\")       BMI is >= 30 and <35. (Class 1 Obesity). The following options were offered after discussion;: NA      Assessment & Plan #1 wellness exam  Patient Self-Management and Personalized Health Advice  The patient has been provided with information about: NA and preventive services including:   · NA.    Visit Diagnoses:    ICD-10-CM ICD-9-CM   1. Medicare annual wellness visit, subsequent  Z00.00 V70.0   2. Essential hypertension  I10 401.9   3. Dyslipidemia  E78.5 272.4   4. Chronic low back pain with sciatica, sciatica laterality unspecified, unspecified back pain laterality  M54.40 724.2    G89.29 724.3     338.29       No orders of the defined types were placed in this encounter.      Outpatient Encounter Medications as of 4/7/2023   Medication Sig Dispense Refill   • apixaban (ELIQUIS) 5 MG tablet tablet Take 1 tablet by mouth 2 (Two) Times a Day. 180 tablet 3   • Cetirizine HCl (ZYRTEC PO) Take  by mouth Daily.     • dilTIAZem CD (CARDIZEM CD) 120 MG 24 hr capsule Take 1 capsule by mouth Daily. 90 capsule 3   • furosemide (LASIX) 40 MG tablet Take 1 tablet by mouth Daily. 90 tablet 3   • lisinopril (PRINIVIL,ZESTRIL) 40 MG tablet Take 1 tablet by mouth Daily. 90 tablet 3   • metoprolol tartrate (LOPRESSOR) 50 MG tablet Take 1 tablet by mouth 2 (Two) Times a Day. 180 tablet 3   • MULTIPLE VITAMIN PO Take  by mouth Daily.       No facility-administered encounter medications on file as of 4/7/2023.       Reviewed use of high risk medication in the elderly: not applicable  Reviewed for potential of harmful drug interactions in the elderly: not " applicable    Follow Up:  Return in about 6 months (around 10/7/2023), or if symptoms worsen or fail to improve, for Recheck.     An After Visit Summary and PPPS with all of these plans were given to the patient.

## 2023-04-07 NOTE — PATIENT INSTRUCTIONS
Medicare Wellness  Personal Prevention Plan of Service     Date of Office Visit:    Encounter Provider:  Alex Wall MD  Place of Service:  [facility]  Patient Name: Marcell De Santiago  :  1955    As part of the Medicare Wellness portion of your visit today, we are providing you with this personalized preventive plan of services (PPPS). This plan is based upon recommendations of the [place] Preventive Services Task Force (USPSTF) and the Advisory Committee on Immunization Practices (ACIP).    This lists the preventive care services that should be considered, and provides dates of when you are due. Items listed as completed are up-to-date and do not require any further intervention.    Health Maintenance   Topic Date Due    ZOSTER VACCINE (1 of 2) Never done    HEPATITIS C SCREENING  Never done    COVID-19 Vaccine (4 - Booster for Pfizer series) 2022    Pneumococcal Vaccine 65+ (1 - PCV) Never done    AAA SCREEN (ONE-TIME)  Never done    INFLUENZA VACCINE  2023    COLORECTAL CANCER SCREENING  2023    ANNUAL WELLNESS VISIT  2024    TDAP/TD VACCINES (2 - Td or Tdap) 2029       No orders of the defined types were placed in this encounter.      Return in about 6 months (around 10/7/2023), or if symptoms worsen or fail to improve, for Recheck.

## 2023-05-03 ENCOUNTER — TELEPHONE (OUTPATIENT)
Dept: CARDIAC SURGERY | Facility: CLINIC | Age: 68
End: 2023-05-03
Payer: MEDICARE

## 2023-06-14 RX ORDER — APIXABAN 5 MG/1
TABLET, FILM COATED ORAL
Qty: 180 TABLET | Refills: 1 | Status: SHIPPED | OUTPATIENT
Start: 2023-06-14

## 2023-12-18 RX ORDER — APIXABAN 5 MG/1
TABLET, FILM COATED ORAL
Qty: 180 TABLET | Refills: 0 | Status: SHIPPED | OUTPATIENT
Start: 2023-12-18